# Patient Record
Sex: FEMALE | Race: WHITE | Employment: OTHER | URBAN - METROPOLITAN AREA
[De-identification: names, ages, dates, MRNs, and addresses within clinical notes are randomized per-mention and may not be internally consistent; named-entity substitution may affect disease eponyms.]

---

## 2017-02-20 ENCOUNTER — HOSPITAL ENCOUNTER (OUTPATIENT)
Dept: MAMMOGRAPHY | Age: 82
Discharge: HOME | End: 2017-02-20
Attending: INTERNAL MEDICINE
Payer: MEDICARE

## 2017-02-20 DIAGNOSIS — Z12.31 VISIT FOR SCREENING MAMMOGRAM: ICD-10-CM

## 2017-02-20 PROCEDURE — 77067 SCR MAMMO BI INCL CAD: CPT

## 2017-07-18 PROBLEM — H54.2X22: Status: ACTIVE | Noted: 2017-07-18

## 2018-01-23 PROBLEM — M85.80 OSTEOPENIA: Status: ACTIVE | Noted: 2018-01-23

## 2019-10-31 PROBLEM — H54.7 VISUAL IMPAIRMENT: Status: ACTIVE | Noted: 2019-10-31

## 2020-07-10 ENCOUNTER — HOSPITAL ENCOUNTER (OUTPATIENT)
Dept: MAMMOGRAPHY | Age: 85
Discharge: HOME | End: 2020-07-10
Attending: INTERNAL MEDICINE
Payer: MEDICARE

## 2020-07-10 DIAGNOSIS — Z12.31 OTHER SCREENING MAMMOGRAM: ICD-10-CM

## 2020-07-10 DIAGNOSIS — Z78.0 POSTMENOPAUSAL STATE: ICD-10-CM

## 2020-07-10 PROCEDURE — 77067 SCR MAMMO BI INCL CAD: CPT

## 2020-07-10 PROCEDURE — 77080 DXA BONE DENSITY AXIAL: CPT

## 2020-07-12 NOTE — PROGRESS NOTES
10 yr hip risk still at 5 %-osteopenia at spine and hip; increase slightly at spine and decreased slightly at hip(but was compared to 2011 scan-no changes for now

## 2021-02-09 ENCOUNTER — APPOINTMENT (OUTPATIENT)
Dept: INFUSION THERAPY | Age: 86
End: 2021-02-09

## 2021-02-23 ENCOUNTER — HOSPITAL ENCOUNTER (OUTPATIENT)
Dept: INFUSION THERAPY | Age: 86
Discharge: HOME | End: 2021-02-23
Payer: MEDICARE

## 2021-02-23 ENCOUNTER — HOSPITAL ENCOUNTER (OUTPATIENT)
Dept: LAB | Age: 86
Discharge: HOME | End: 2021-02-23

## 2021-02-23 VITALS
RESPIRATION RATE: 16 BRPM | BODY MASS INDEX: 21.9 KG/M2 | HEIGHT: 62 IN | HEART RATE: 71 BPM | TEMPERATURE: 98 F | WEIGHT: 119 LBS | DIASTOLIC BLOOD PRESSURE: 77 MMHG | SYSTOLIC BLOOD PRESSURE: 151 MMHG | OXYGEN SATURATION: 97 %

## 2021-02-23 LAB
CALCIUM SERPL-MCNC: 9.5 MG/DL (ref 8.3–10.4)
CREAT SERPL-MCNC: 0.8 MG/DL (ref 0.6–1)

## 2021-02-23 PROCEDURE — 74011250636 HC RX REV CODE- 250/636: Performed by: INTERNAL MEDICINE

## 2021-02-23 PROCEDURE — 36415 COLL VENOUS BLD VENIPUNCTURE: CPT

## 2021-02-23 PROCEDURE — 82310 ASSAY OF CALCIUM: CPT

## 2021-02-23 PROCEDURE — 96374 THER/PROPH/DIAG INJ IV PUSH: CPT

## 2021-02-23 PROCEDURE — 82565 ASSAY OF CREATININE: CPT

## 2021-02-23 RX ORDER — ZOLEDRONIC ACID 5 MG/100ML
5 INJECTION, SOLUTION INTRAVENOUS ONCE
Status: COMPLETED | OUTPATIENT
Start: 2021-02-23 | End: 2021-02-23

## 2021-02-23 RX ADMIN — ZOLEDRONIC ACID 5 MG: 0.05 INJECTION, SOLUTION INTRAVENOUS at 15:45

## 2021-02-23 NOTE — PROGRESS NOTES
Arrived to the [facility]. Labs reviewed/ RECLAST completed and tolerated well. Any issues or concerns during appointment: denies  Patient given education on infusion  Instructed patient to notify provider for any issues or worrisome symptoms. They verbalized understanding. Patient aware of next infusion appointment scheduled for : none, the infusion is yearly will need new order for next year. Discharged ambulatory.

## 2021-05-19 ENCOUNTER — HOSPITAL ENCOUNTER (OUTPATIENT)
Dept: ULTRASOUND IMAGING | Age: 86
Discharge: HOME | End: 2021-05-19
Attending: INTERNAL MEDICINE
Payer: MEDICARE

## 2021-05-19 DIAGNOSIS — I73.9 CLAUDICATION OF BOTH LOWER EXTREMITIES (HCC): ICD-10-CM

## 2021-05-19 PROCEDURE — 93925 LOWER EXTREMITY STUDY: CPT

## 2021-05-19 NOTE — PROGRESS NOTES
Vascular test does suggest disease in right leg-may not be enough for an intervention but can see how sx are until next visit in July

## 2021-11-09 ENCOUNTER — TRANSCRIBE ORDER (OUTPATIENT)
Dept: SCHEDULING | Age: 86
End: 2021-11-09

## 2021-11-09 DIAGNOSIS — Z12.31 VISIT FOR SCREENING MAMMOGRAM: Primary | ICD-10-CM

## 2021-12-09 ENCOUNTER — HOSPITAL ENCOUNTER (OUTPATIENT)
Dept: MAMMOGRAPHY | Age: 86
Discharge: HOME | End: 2021-12-09
Attending: INTERNAL MEDICINE
Payer: MEDICARE

## 2021-12-09 DIAGNOSIS — Z12.31 VISIT FOR SCREENING MAMMOGRAM: ICD-10-CM

## 2021-12-09 PROCEDURE — 77067 SCR MAMMO BI INCL CAD: CPT

## 2022-03-18 PROBLEM — M85.80 OSTEOPENIA: Status: ACTIVE | Noted: 2018-01-23

## 2022-03-18 PROBLEM — H54.2X22: Status: ACTIVE | Noted: 2017-07-18

## 2022-03-19 PROBLEM — H54.7 VISUAL IMPAIRMENT: Status: ACTIVE | Noted: 2019-10-31

## 2022-06-27 ENCOUNTER — OFFICE VISIT (OUTPATIENT)
Dept: INTERNAL MEDICINE CLINIC | Facility: CLINIC | Age: 87
End: 2022-06-27
Payer: MEDICARE

## 2022-06-27 ENCOUNTER — TELEPHONE (OUTPATIENT)
Dept: INTERNAL MEDICINE CLINIC | Facility: CLINIC | Age: 87
End: 2022-06-27

## 2022-06-27 VITALS
SYSTOLIC BLOOD PRESSURE: 136 MMHG | DIASTOLIC BLOOD PRESSURE: 76 MMHG | HEART RATE: 64 BPM | BODY MASS INDEX: 22.36 KG/M2 | WEIGHT: 121.5 LBS | HEIGHT: 62 IN

## 2022-06-27 DIAGNOSIS — H53.10 VISUAL DISTURBANCE, SUBJECTIVE: ICD-10-CM

## 2022-06-27 DIAGNOSIS — H53.10 VISUAL DISTURBANCE, SUBJECTIVE: Primary | ICD-10-CM

## 2022-06-27 LAB — ERYTHROCYTE [SEDIMENTATION RATE] IN BLOOD: <1 MM/HR (ref 0–30)

## 2022-06-27 PROCEDURE — G8420 CALC BMI NORM PARAMETERS: HCPCS | Performed by: INTERNAL MEDICINE

## 2022-06-27 PROCEDURE — 1090F PRES/ABSN URINE INCON ASSESS: CPT | Performed by: INTERNAL MEDICINE

## 2022-06-27 PROCEDURE — G8427 DOCREV CUR MEDS BY ELIG CLIN: HCPCS | Performed by: INTERNAL MEDICINE

## 2022-06-27 PROCEDURE — 99214 OFFICE O/P EST MOD 30 MIN: CPT | Performed by: INTERNAL MEDICINE

## 2022-06-27 PROCEDURE — 1036F TOBACCO NON-USER: CPT | Performed by: INTERNAL MEDICINE

## 2022-06-27 PROCEDURE — 1123F ACP DISCUSS/DSCN MKR DOCD: CPT | Performed by: INTERNAL MEDICINE

## 2022-06-27 RX ORDER — CLOPIDOGREL BISULFATE 75 MG/1
75 TABLET ORAL DAILY
Qty: 30 TABLET | Refills: 1 | Status: SHIPPED | OUTPATIENT
Start: 2022-06-27 | End: 2022-08-23 | Stop reason: SDUPTHER

## 2022-06-27 ASSESSMENT — PATIENT HEALTH QUESTIONNAIRE - PHQ9
SUM OF ALL RESPONSES TO PHQ QUESTIONS 1-9: 0
SUM OF ALL RESPONSES TO PHQ9 QUESTIONS 1 & 2: 0
2. FEELING DOWN, DEPRESSED OR HOPELESS: 0
SUM OF ALL RESPONSES TO PHQ QUESTIONS 1-9: 0
1. LITTLE INTEREST OR PLEASURE IN DOING THINGS: 0
SUM OF ALL RESPONSES TO PHQ QUESTIONS 1-9: 0
SUM OF ALL RESPONSES TO PHQ QUESTIONS 1-9: 0

## 2022-06-27 NOTE — PROGRESS NOTES
SUBJECTIVE:   Aden Cade is a 80 y.o. female seen for a visit regarding   Chief Complaint   Patient presents with    Cerebrovascular Accident     ? mini stroke over the W/E-head \"feels funny\"    Encopresis        Cerebrovascular Accident  This is a new problem. The current episode started in the past 7 days (2 days ago head felt funny; mid morning , had red spots in vision , no headache, no speech issue , numbness or weakness-red spots off and on over the day; was fine next day and today. Dots were couple minutes , resolve and return in few minutes). The problem occurs daily (saw postcard on wall off office here -twice-then disappear). Pertinent negatives include no fever, headaches, numbness or weakness. Other  This is a new problem. The current episode started in the past 7 days (went off metamucil due to stool leakage -has hx irritable bowel). Pertinent negatives include no fever, headaches, numbness or weakness. Past Medical History, Past Surgical History, Family history, Social History, and Medications were all reviewed with the patient today and updated as necessary. Current Outpatient Medications   Medication Sig Dispense Refill    clopidogrel (PLAVIX) 75 MG tablet Take 1 tablet by mouth daily 30 tablet 1    aspirin 81 MG EC tablet Take 81 mg by mouth daily      vitamin D 25 MCG (1000 UT) CAPS Take 1,000 Units by mouth daily       No current facility-administered medications for this visit.      Allergies   Allergen Reactions    Atorvastatin Other (See Comments)     Muscle aches    Ezetimibe-Simvastatin Other (See Comments)     Hair loss    Sulfa Antibiotics Rash     Patient Active Problem List   Diagnosis    Esophageal dysmotility    Osteopenia    Osteoarthritis, hand    Visual impairment severe bilaterally    Hypertension    DDD (degenerative disc disease), lumbar    Osteoporosis, post-menopausal    GERD (gastroesophageal reflux disease)    Dysphagia    Visual impairment Social History     Tobacco Use    Smoking status: Never Smoker    Smokeless tobacco: Never Used   Substance Use Topics    Alcohol use: Yes          Review of Systems   Constitutional: Negative for fever and unexpected weight change. Eyes: Positive for visual disturbance. Neurological: Negative for dizziness, speech difficulty, weakness, numbness and headaches. OBJECTIVE:  /76   Pulse 64   Ht 5' 2\" (1.575 m)   Wt 121 lb 8 oz (55.1 kg)   BMI 22.22 kg/m²      Physical Exam  Eyes:      Extraocular Movements:      Right eye: Normal extraocular motion. Neurological:      Cranial Nerves: No cranial nerve deficit. Deep Tendon Reflexes: Reflexes normal.            Medical problems and test results were reviewed with the patient today. ASSESSMENT and PLAN     1. Visual disturbance, subjective  -     clopidogrel (PLAVIX) 75 MG tablet; Take 1 tablet by mouth daily, Disp-30 tablet, R-1Normal  -     AFL - [facility]  -     Sedimentation Rate; Future       the following changes in treatment are made unclear what sx is from -atypical- is on an eye drop for dry eye that does not have preservative-check ESR for ischemic temporal arteritis (but no headache) -add plavix for short interval in case this is circulation -no statin for anti inflammatory effect since muscle issues with multiple statins-consider MRI if keeps recurring -? If this TIA type of syndrome  lab results and schedule for future lab studies reviewed with patient  reviewed medications and side effects in detail     No follow-ups on file.

## 2022-06-27 NOTE — TELEPHONE ENCOUNTER
Patient called office this morning through [facility] ([facility]) c/o ?mini stroke. She reported that her head \"felt funny\" over the weekend and asking for an appt with Dr Erika Sands. Appt given for this afternoon with Dr Erika Sands to assess.  Patient accepted appt/TD

## 2022-06-28 ENCOUNTER — TELEPHONE (OUTPATIENT)
Dept: INTERNAL MEDICINE CLINIC | Facility: CLINIC | Age: 87
End: 2022-06-28

## 2022-06-28 NOTE — TELEPHONE ENCOUNTER
Patient notified per Dr Nicholas Cantrell she is to take both for right now till he tells to stop/TD

## 2022-06-28 NOTE — TELEPHONE ENCOUNTER
----- Message from Blaine Brumfield sent at 6/28/2022 12:04 PM EDT -----  Subject: Message to Provider    QUESTIONS  Information for Provider? Patient wants to know if she can continue to   take a baby aspirin along with the Plavix that she is starting now. Please   call asap to advise.  ---------------------------------------------------------------------------  --------------  CALL BACK INFO  What is the best way for the office to contact you? OK to leave message on   voicemail  Preferred Call Back Phone Number?  [phone]  ---------------------------------------------------------------------------  --------------  SCRIPT ANSWERS  undefined

## 2022-07-20 ENCOUNTER — OFFICE VISIT (OUTPATIENT)
Dept: INTERNAL MEDICINE CLINIC | Facility: CLINIC | Age: 87
End: 2022-07-20
Payer: MEDICARE

## 2022-07-20 VITALS
WEIGHT: 122 LBS | BODY MASS INDEX: 23.03 KG/M2 | HEIGHT: 61 IN | SYSTOLIC BLOOD PRESSURE: 162 MMHG | DIASTOLIC BLOOD PRESSURE: 90 MMHG

## 2022-07-20 DIAGNOSIS — H53.10 VISUAL DISTURBANCE, SUBJECTIVE: ICD-10-CM

## 2022-07-20 DIAGNOSIS — I10 HYPERTENSION, UNSPECIFIED TYPE: ICD-10-CM

## 2022-07-20 DIAGNOSIS — R09.89 BILATERAL CAROTID BRUITS: Primary | ICD-10-CM

## 2022-07-20 PROCEDURE — 1090F PRES/ABSN URINE INCON ASSESS: CPT | Performed by: INTERNAL MEDICINE

## 2022-07-20 PROCEDURE — 99213 OFFICE O/P EST LOW 20 MIN: CPT | Performed by: INTERNAL MEDICINE

## 2022-07-20 PROCEDURE — G8420 CALC BMI NORM PARAMETERS: HCPCS | Performed by: INTERNAL MEDICINE

## 2022-07-20 PROCEDURE — 1123F ACP DISCUSS/DSCN MKR DOCD: CPT | Performed by: INTERNAL MEDICINE

## 2022-07-20 PROCEDURE — 1036F TOBACCO NON-USER: CPT | Performed by: INTERNAL MEDICINE

## 2022-07-20 PROCEDURE — G8427 DOCREV CUR MEDS BY ELIG CLIN: HCPCS | Performed by: INTERNAL MEDICINE

## 2022-07-20 ASSESSMENT — ENCOUNTER SYMPTOMS: PHOTOPHOBIA: 0

## 2022-08-23 ENCOUNTER — OFFICE VISIT (OUTPATIENT)
Dept: INTERNAL MEDICINE CLINIC | Facility: CLINIC | Age: 87
End: 2022-08-23
Payer: MEDICARE

## 2022-08-23 VITALS
OXYGEN SATURATION: 99 % | HEIGHT: 61 IN | WEIGHT: 123.5 LBS | SYSTOLIC BLOOD PRESSURE: 137 MMHG | DIASTOLIC BLOOD PRESSURE: 85 MMHG | HEART RATE: 66 BPM | BODY MASS INDEX: 23.32 KG/M2 | TEMPERATURE: 97.9 F

## 2022-08-23 DIAGNOSIS — H54.2X22 VISUAL IMPAIRMENT SEVERE BILATERALLY: ICD-10-CM

## 2022-08-23 DIAGNOSIS — R73.01 IMPAIRED FASTING GLUCOSE: ICD-10-CM

## 2022-08-23 DIAGNOSIS — I73.9 PERIPHERAL VASCULAR DISEASE (HCC): ICD-10-CM

## 2022-08-23 DIAGNOSIS — I10 HYPERTENSION, UNSPECIFIED TYPE: ICD-10-CM

## 2022-08-23 DIAGNOSIS — H53.10 VISUAL DISTURBANCE, SUBJECTIVE: ICD-10-CM

## 2022-08-23 DIAGNOSIS — Z00.00 MEDICARE ANNUAL WELLNESS VISIT, SUBSEQUENT: Primary | ICD-10-CM

## 2022-08-23 DIAGNOSIS — M85.859 OSTEOPENIA OF HIP, UNSPECIFIED LATERALITY: ICD-10-CM

## 2022-08-23 LAB
ALBUMIN SERPL-MCNC: 4 G/DL (ref 3.2–4.6)
ALBUMIN/GLOB SERPL: 1.3 {RATIO} (ref 1.2–3.5)
ALP SERPL-CCNC: 89 U/L (ref 50–136)
ALT SERPL-CCNC: 23 U/L (ref 12–65)
ANION GAP SERPL CALC-SCNC: 3 MMOL/L (ref 7–16)
AST SERPL-CCNC: 16 U/L (ref 15–37)
BASOPHILS # BLD: 0 K/UL (ref 0–0.2)
BASOPHILS NFR BLD: 1 % (ref 0–2)
BILIRUB SERPL-MCNC: 0.4 MG/DL (ref 0.2–1.1)
BUN SERPL-MCNC: 14 MG/DL (ref 8–23)
CALCIUM SERPL-MCNC: 9.4 MG/DL (ref 8.3–10.4)
CHLORIDE SERPL-SCNC: 106 MMOL/L (ref 98–107)
CO2 SERPL-SCNC: 27 MMOL/L (ref 21–32)
CREAT SERPL-MCNC: 0.6 MG/DL (ref 0.6–1)
DIFFERENTIAL METHOD BLD: NORMAL
EOSINOPHIL # BLD: 0.1 K/UL (ref 0–0.8)
EOSINOPHIL NFR BLD: 2 % (ref 0.5–7.8)
ERYTHROCYTE [DISTWIDTH] IN BLOOD BY AUTOMATED COUNT: 12.4 % (ref 11.9–14.6)
GLOBULIN SER CALC-MCNC: 3.2 G/DL (ref 2.3–3.5)
GLUCOSE SERPL-MCNC: 137 MG/DL (ref 65–100)
HCT VFR BLD AUTO: 41.3 % (ref 35.8–46.3)
HGB BLD-MCNC: 13.3 G/DL (ref 11.7–15.4)
IMM GRANULOCYTES # BLD AUTO: 0 K/UL (ref 0–0.5)
IMM GRANULOCYTES NFR BLD AUTO: 0 % (ref 0–5)
LYMPHOCYTES # BLD: 1.7 K/UL (ref 0.5–4.6)
LYMPHOCYTES NFR BLD: 31 % (ref 13–44)
MCH RBC QN AUTO: 30.4 PG (ref 26.1–32.9)
MCHC RBC AUTO-ENTMCNC: 32.2 G/DL (ref 31.4–35)
MCV RBC AUTO: 94.3 FL (ref 79.6–97.8)
MONOCYTES # BLD: 0.6 K/UL (ref 0.1–1.3)
MONOCYTES NFR BLD: 11 % (ref 4–12)
NEUTS SEG # BLD: 3 K/UL (ref 1.7–8.2)
NEUTS SEG NFR BLD: 55 % (ref 43–78)
NRBC # BLD: 0 K/UL (ref 0–0.2)
PLATELET # BLD AUTO: 318 K/UL (ref 150–450)
PMV BLD AUTO: 10.1 FL (ref 9.4–12.3)
POTASSIUM SERPL-SCNC: 5 MMOL/L (ref 3.5–5.1)
PROT SERPL-MCNC: 7.2 G/DL (ref 6.3–8.2)
RBC # BLD AUTO: 4.38 M/UL (ref 4.05–5.2)
SODIUM SERPL-SCNC: 136 MMOL/L (ref 136–145)
WBC # BLD AUTO: 5.4 K/UL (ref 4.3–11.1)

## 2022-08-23 PROCEDURE — G0439 PPPS, SUBSEQ VISIT: HCPCS | Performed by: INTERNAL MEDICINE

## 2022-08-23 PROCEDURE — 1123F ACP DISCUSS/DSCN MKR DOCD: CPT | Performed by: INTERNAL MEDICINE

## 2022-08-23 RX ORDER — CLOPIDOGREL BISULFATE 75 MG/1
75 TABLET ORAL DAILY
Qty: 90 TABLET | Refills: 3 | Status: SHIPPED | OUTPATIENT
Start: 2022-08-23

## 2022-08-23 RX ORDER — ZOLEDRONIC ACID 5 MG/100ML
5 INJECTION, SOLUTION INTRAVENOUS ONCE
Qty: 100 ML | Refills: 0 | Status: SHIPPED | OUTPATIENT
Start: 2022-08-23 | End: 2022-08-23

## 2022-08-23 ASSESSMENT — LIFESTYLE VARIABLES
HOW MANY STANDARD DRINKS CONTAINING ALCOHOL DO YOU HAVE ON A TYPICAL DAY: PATIENT DOES NOT DRINK
HOW OFTEN DO YOU HAVE A DRINK CONTAINING ALCOHOL: NEVER

## 2022-08-23 ASSESSMENT — PATIENT HEALTH QUESTIONNAIRE - PHQ9
SUM OF ALL RESPONSES TO PHQ QUESTIONS 1-9: 0
1. LITTLE INTEREST OR PLEASURE IN DOING THINGS: 0
SUM OF ALL RESPONSES TO PHQ QUESTIONS 1-9: 0

## 2022-08-23 NOTE — PROGRESS NOTES
Medicare Annual Wellness Visit    Brayden Neal is here for Medicare AWV    Assessment & Plan   Medicare annual wellness visit, subsequent  Visual impairment severe bilaterally  Impaired fasting glucose  -     Hemoglobin A1C; Future  Osteopenia of hip, unspecified laterality  -     zoledronic acid (RECLAST) 5 MG/100ML SOLN; Infuse 100 mLs intravenously once for 1 dose, Disp-100 mL, R-0Print  Hypertension, unspecified type  -     CBC with Auto Differential; Future  -     Comprehensive Metabolic Panel; Future  Visual disturbance, subjective  -     clopidogrel (PLAVIX) 75 MG tablet; Take 1 tablet by mouth daily, Disp-90 tablet, R-3Normal  Peripheral vascular disease (HCC)  -     clopidogrel (PLAVIX) 75 MG tablet; Take 1 tablet by mouth daily, Disp-90 tablet, R-3Normal    Recommendations for Preventive Services Due: see orders and patient instructions/AVS.  Recommended screening schedule for the next 5-10 years is provided to the patient in written form: see Patient Instructions/AVS.     Return for Medicare Annual Wellness Visit in 1 year. Subjective   The following acute and/or chronic problems were also addressed today:  HTN- no checked at home -has been labile in past-not on med-BP high here initially and down later--hx elevated lipid but statin intolerant  A1C 6.5 last year --fasting glucoses slightly up--PVD stable ; had mild/mod decrease ESTEBAN last year-on plavix-last reclast 2/2021 so do again   Patient's complete Health Risk Assessment and screening values have been reviewed and are found in Flowsheets. The following problems were reviewed today and where indicated follow up appointments were made and/or referrals ordered.     Positive Risk Factor Screenings with Interventions:               Hearing/Vision:  Do you or your family notice any trouble with your hearing that hasn't been managed with hearing aids?: No  Do you have difficulty driving, watching TV, or doing any of your daily activities because of your eyesight?: (!) Yes  Have you had an eye exam within the past year?: Yes  No results found. Hearing/Vision Interventions:  Legally blind  -uses bilateral hearing aids            Objective   Vitals:    08/23/22 0926 08/23/22 1012   BP: (!) 162/74 137/85   Pulse: 66    Temp: 97.9 °F (36.6 °C)    SpO2: 99%    Weight: 123 lb 8 oz (56 kg)    Height: 5' 1\" (1.549 m)       Body mass index is 23.34 kg/m². General Appearance: alert and oriented to person, place and time, well-developed and well-nourished, in no acute distress  Pulmonary/Chest: clear to auscultation bilaterally- no wheezes, rales or rhonchi, normal air movement, no respiratory distress  Cardiovascular: normal rate, normal S1 and S2, no gallops, intact distal pulses, and no carotid bruits       Allergies   Allergen Reactions    Atorvastatin Other (See Comments)     Muscle aches    Ezetimibe-Simvastatin Other (See Comments)     Hair loss    Sulfa Antibiotics Rash     Prior to Visit Medications    Medication Sig Taking?  Authorizing Provider   zoledronic acid (RECLAST) 5 MG/100ML SOLN Infuse 100 mLs intravenously once for 1 dose Yes Jose Wang MD   clopidogrel (PLAVIX) 75 MG tablet Take 1 tablet by mouth daily Yes Jose Wang MD   aspirin 81 MG EC tablet Take 81 mg by mouth daily Yes Ar Automatic Reconciliation   vitamin D 25 MCG (1000 UT) CAPS Take 1,000 Units by mouth daily Yes Ar Automatic Reconciliation       CareTeam (Including outside providers/suppliers regularly involved in providing care):   Patient Care Team:  Jose Wang MD as PCP - General  Jose Wang MD as PCP - [facility] Empaneled Provider     Reviewed and updated this visit:  Tobacco  Allergies  Meds  Problems  Med Hx  Surg Hx  Soc Hx  Fam Hx

## 2022-08-24 LAB
EST. AVERAGE GLUCOSE BLD GHB EST-MCNC: 143 MG/DL
HBA1C MFR BLD: 6.6 % (ref 4.8–5.6)

## 2022-08-26 ENCOUNTER — TELEPHONE (OUTPATIENT)
Dept: INTERNAL MEDICINE CLINIC | Facility: CLINIC | Age: 87
End: 2022-08-26

## 2022-09-01 ENCOUNTER — HOSPITAL ENCOUNTER (OUTPATIENT)
Dept: LAB | Age: 87
Discharge: HOME | End: 2022-09-04

## 2022-09-01 ENCOUNTER — HOSPITAL ENCOUNTER (OUTPATIENT)
Dept: INFUSION THERAPY | Age: 87
Discharge: HOME | End: 2022-09-01
Payer: MEDICARE

## 2022-09-01 VITALS
WEIGHT: 122.2 LBS | DIASTOLIC BLOOD PRESSURE: 77 MMHG | SYSTOLIC BLOOD PRESSURE: 174 MMHG | RESPIRATION RATE: 16 BRPM | TEMPERATURE: 98.3 F | OXYGEN SATURATION: 99 % | HEART RATE: 73 BPM | BODY MASS INDEX: 23.09 KG/M2

## 2022-09-01 LAB
CALCIUM SERPL-MCNC: 9.1 MG/DL (ref 8.3–10.4)
CREAT SERPL-MCNC: 0.7 MG/DL (ref 0.6–1)

## 2022-09-01 PROCEDURE — 82565 ASSAY OF CREATININE: CPT

## 2022-09-01 PROCEDURE — 36415 COLL VENOUS BLD VENIPUNCTURE: CPT

## 2022-09-01 PROCEDURE — 96365 THER/PROPH/DIAG IV INF INIT: CPT

## 2022-09-01 PROCEDURE — 6360000002 HC RX W HCPCS: Performed by: INTERNAL MEDICINE

## 2022-09-01 PROCEDURE — 82310 ASSAY OF CALCIUM: CPT

## 2022-09-01 PROCEDURE — 6370000000 HC RX 637 (ALT 250 FOR IP): Performed by: INTERNAL MEDICINE

## 2022-09-01 RX ORDER — ACETAMINOPHEN 325 MG/1
650 TABLET ORAL ONCE
Status: COMPLETED | OUTPATIENT
Start: 2022-09-01 | End: 2022-09-01

## 2022-09-01 RX ORDER — ZOLEDRONIC ACID 5 MG/100ML
5 INJECTION, SOLUTION INTRAVENOUS ONCE
Status: COMPLETED | OUTPATIENT
Start: 2022-09-01 | End: 2022-09-01

## 2022-09-01 RX ADMIN — ZOLEDRONIC ACID 5 MG: 0.05 INJECTION, SOLUTION INTRAVENOUS at 14:12

## 2022-09-01 RX ADMIN — ACETAMINOPHEN 650 MG: 325 TABLET ORAL at 14:09

## 2022-09-01 NOTE — PROGRESS NOTES
Patient arrived ambulatory to infusion. Reclast infusion completed. Patient tolerated well. Discharged ambulatory.

## 2022-11-08 ENCOUNTER — OFFICE VISIT (OUTPATIENT)
Dept: INTERNAL MEDICINE CLINIC | Facility: CLINIC | Age: 87
End: 2022-11-08
Payer: MEDICARE

## 2022-11-08 VITALS
DIASTOLIC BLOOD PRESSURE: 70 MMHG | BODY MASS INDEX: 23.6 KG/M2 | HEIGHT: 61 IN | WEIGHT: 125 LBS | SYSTOLIC BLOOD PRESSURE: 156 MMHG

## 2022-11-08 DIAGNOSIS — R60.0 EDEMA OF BOTH FEET: ICD-10-CM

## 2022-11-08 DIAGNOSIS — I10 HYPERTENSION, UNSPECIFIED TYPE: Primary | ICD-10-CM

## 2022-11-08 PROCEDURE — G8420 CALC BMI NORM PARAMETERS: HCPCS | Performed by: INTERNAL MEDICINE

## 2022-11-08 PROCEDURE — 1090F PRES/ABSN URINE INCON ASSESS: CPT | Performed by: INTERNAL MEDICINE

## 2022-11-08 PROCEDURE — 99213 OFFICE O/P EST LOW 20 MIN: CPT | Performed by: INTERNAL MEDICINE

## 2022-11-08 PROCEDURE — 1036F TOBACCO NON-USER: CPT | Performed by: INTERNAL MEDICINE

## 2022-11-08 PROCEDURE — G8427 DOCREV CUR MEDS BY ELIG CLIN: HCPCS | Performed by: INTERNAL MEDICINE

## 2022-11-08 PROCEDURE — 1123F ACP DISCUSS/DSCN MKR DOCD: CPT | Performed by: INTERNAL MEDICINE

## 2022-11-08 PROCEDURE — G8484 FLU IMMUNIZE NO ADMIN: HCPCS | Performed by: INTERNAL MEDICINE

## 2022-11-08 RX ORDER — FUROSEMIDE 20 MG/1
TABLET ORAL
Qty: 15 TABLET | Refills: 1 | Status: SHIPPED | OUTPATIENT
Start: 2022-11-08

## 2022-11-08 ASSESSMENT — ENCOUNTER SYMPTOMS
COUGH: 0
SHORTNESS OF BREATH: 0

## 2022-11-08 NOTE — PROGRESS NOTES
SUBJECTIVE:   Conchita Valladares is a 80 y.o. female seen for a visit regarding   Chief Complaint   Patient presents with    Leg Swelling     Pt c/o bilateral swelling to lower legs,more to her left leg (Pt had boil peanuts Sat)         Swelling  This is a new problem. The current episode started in the past 7 days. The problem occurs constantly (swelling in legs Sun --had boiled peanuts on Sat(in salt water)). Pertinent negatives include no coughing. Past Medical History, Past Surgical History, Family history, Social History, and Medications were all reviewed with the patient today and updated as necessary. Current Outpatient Medications   Medication Sig Dispense Refill    furosemide (LASIX) 20 MG tablet 1 qod for fluid 15 tablet 1    zoledronic acid (RECLAST) 5 MG/100ML SOLN Infuse 100 mLs intravenously once for 1 dose 100 mL 0    clopidogrel (PLAVIX) 75 MG tablet Take 1 tablet by mouth daily 90 tablet 3    aspirin 81 MG EC tablet Take 81 mg by mouth daily      vitamin D 25 MCG (1000 UT) CAPS Take 1,000 Units by mouth daily       No current facility-administered medications for this visit. Allergies   Allergen Reactions    Atorvastatin Other (See Comments)     Muscle aches    Ezetimibe-Simvastatin Other (See Comments)     Hair loss    Sulfa Antibiotics Rash     Patient Active Problem List   Diagnosis    Esophageal dysmotility    Osteopenia    Osteoarthritis, hand    Visual impairment severe bilaterally    Hypertension    DDD (degenerative disc disease), lumbar    GERD (gastroesophageal reflux disease)    Dysphagia    Visual impairment    Impaired fasting glucose     Social History     Tobacco Use    Smoking status: Never    Smokeless tobacco: Never   Substance Use Topics    Alcohol use: Yes          Review of Systems   Respiratory:  Negative for cough and shortness of breath. Cardiovascular:  Positive for leg swelling.        OBJECTIVE:  BP (!) 160/62   Ht 5' 1\" (1.549 m)   Wt 125 lb (56.7 kg) BMI 23.62 kg/m²      Physical Exam  Constitutional:       General: She is not in acute distress. Appearance: Normal appearance. Cardiovascular:      Rate and Rhythm: Normal rate and regular rhythm. Heart sounds: No murmur heard. Pulmonary:      Effort: Pulmonary effort is normal.      Breath sounds: No wheezing or rales. Musculoskeletal:      Right lower leg: Edema present. Left lower leg: Edema present. Comments: 1-2 plus to upper tibial bilateral          Medical problems and test results were reviewed with the patient today. ASSESSMENT and PLAN     1. Hypertension, unspecified type  2. Edema of both feet  -     furosemide (LASIX) 20 MG tablet; 1 qod for fluid, Disp-15 tablet, R-1Normal   Likely extra sodium from the nuts (and had pickles)-no CHF sx -use lasix qod few days should help this then stop it and help the BP also  reviewed medications and side effects in detail     No follow-ups on file.

## 2023-01-26 ENCOUNTER — APPOINTMENT (OUTPATIENT)
Dept: GENERAL RADIOLOGY | Age: 88
DRG: 378 | End: 2023-01-26
Payer: MEDICARE

## 2023-01-26 ENCOUNTER — HOSPITAL ENCOUNTER (INPATIENT)
Age: 88
LOS: 4 days | Discharge: HOME | DRG: 378 | End: 2023-02-01
Attending: EMERGENCY MEDICINE | Admitting: HOSPITALIST
Payer: MEDICARE

## 2023-01-26 ENCOUNTER — APPOINTMENT (OUTPATIENT)
Dept: CT IMAGING | Age: 88
DRG: 378 | End: 2023-01-26
Payer: MEDICARE

## 2023-01-26 DIAGNOSIS — H53.9 TRANSIENT VISION DISTURBANCE: ICD-10-CM

## 2023-01-26 DIAGNOSIS — Z09 HOSPITAL DISCHARGE FOLLOW-UP: ICD-10-CM

## 2023-01-26 DIAGNOSIS — K92.2 GASTROINTESTINAL HEMORRHAGE, UNSPECIFIED GASTROINTESTINAL HEMORRHAGE TYPE: Primary | ICD-10-CM

## 2023-01-26 DIAGNOSIS — D64.9 ANEMIA, UNSPECIFIED TYPE: ICD-10-CM

## 2023-01-26 PROBLEM — H35.30 MACULAR DEGENERATION: Status: ACTIVE | Noted: 2023-01-26

## 2023-01-26 PROBLEM — H54.8 LEGALLY BLIND: Status: ACTIVE | Noted: 2023-01-26

## 2023-01-26 PROBLEM — H54.7 VISUAL IMPAIRMENT: Status: ACTIVE | Noted: 2019-10-31

## 2023-01-26 PROBLEM — H40.9 GLAUCOMA: Status: ACTIVE | Noted: 2023-01-26

## 2023-01-26 LAB
ALBUMIN SERPL-MCNC: 3.4 G/DL (ref 3.2–4.6)
ALBUMIN/GLOB SERPL: 1.2 (ref 0.4–1.6)
ALP SERPL-CCNC: 60 U/L (ref 50–130)
ALT SERPL-CCNC: 18 U/L (ref 12–65)
ANION GAP SERPL CALC-SCNC: 3 MMOL/L (ref 2–11)
APPEARANCE UR: CLEAR
AST SERPL-CCNC: 13 U/L (ref 15–37)
BASOPHILS # BLD: 0 K/UL (ref 0–0.2)
BASOPHILS NFR BLD: 0 % (ref 0–2)
BILIRUB SERPL-MCNC: 0.3 MG/DL (ref 0.2–1.1)
BILIRUB UR QL: NEGATIVE
BUN SERPL-MCNC: 24 MG/DL (ref 8–23)
CALCIUM SERPL-MCNC: 8.8 MG/DL (ref 8.3–10.4)
CHLORIDE SERPL-SCNC: 110 MMOL/L (ref 101–110)
CO2 SERPL-SCNC: 26 MMOL/L (ref 21–32)
COLOR UR: NORMAL
CREAT SERPL-MCNC: 0.71 MG/DL (ref 0.6–1)
DIFFERENTIAL METHOD BLD: ABNORMAL
EOSINOPHIL # BLD: 0 K/UL (ref 0–0.8)
EOSINOPHIL NFR BLD: 0 % (ref 0.5–7.8)
ERYTHROCYTE [DISTWIDTH] IN BLOOD BY AUTOMATED COUNT: 15.9 % (ref 11.9–14.6)
FERRITIN SERPL-MCNC: 13 NG/ML (ref 8–388)
FOLATE SERPL-MCNC: 44.7 NG/ML (ref 3.1–17.5)
GLOBULIN SER CALC-MCNC: 2.8 G/DL (ref 2.8–4.5)
GLUCOSE SERPL-MCNC: 163 MG/DL (ref 65–100)
GLUCOSE UR STRIP.AUTO-MCNC: 100 MG/DL
HCT VFR BLD AUTO: 18.1 % (ref 35.8–46.3)
HGB BLD-MCNC: 5.5 G/DL (ref 11.7–15.4)
HGB UR QL STRIP: NEGATIVE
HISTORY CHECK: NORMAL
IMM GRANULOCYTES # BLD AUTO: 0 K/UL (ref 0–0.5)
IMM GRANULOCYTES NFR BLD AUTO: 1 % (ref 0–5)
IRON SATN MFR SERPL: 2 %
IRON SERPL-MCNC: 7 UG/DL (ref 35–150)
KETONES UR QL STRIP.AUTO: 15 MG/DL
LEUKOCYTE ESTERASE UR QL STRIP.AUTO: NEGATIVE
LYMPHOCYTES # BLD: 1.2 K/UL (ref 0.5–4.6)
LYMPHOCYTES NFR BLD: 18 % (ref 13–44)
MCH RBC QN AUTO: 23.9 PG (ref 26.1–32.9)
MCHC RBC AUTO-ENTMCNC: 30.4 G/DL (ref 31.4–35)
MCV RBC AUTO: 78.7 FL (ref 82–102)
MONOCYTES # BLD: 0.6 K/UL (ref 0.1–1.3)
MONOCYTES NFR BLD: 9 % (ref 4–12)
NEUTS SEG # BLD: 4.6 K/UL (ref 1.7–8.2)
NEUTS SEG NFR BLD: 72 % (ref 43–78)
NITRITE UR QL STRIP.AUTO: NEGATIVE
NRBC # BLD: 0 K/UL (ref 0–0.2)
PH UR STRIP: 7.5
PLATELET # BLD AUTO: 269 K/UL (ref 150–450)
PMV BLD AUTO: 9.8 FL (ref 9.4–12.3)
POTASSIUM SERPL-SCNC: 3.8 MMOL/L (ref 3.5–5.1)
PROT SERPL-MCNC: 6.2 G/DL (ref 6.3–8.2)
PROT UR STRIP-MCNC: NEGATIVE MG/DL
RBC # BLD AUTO: 2.3 M/UL (ref 4.05–5.2)
SODIUM SERPL-SCNC: 139 MMOL/L (ref 133–143)
SP GR UR REFRACTOMETRY: 1.01
TIBC SERPL-MCNC: 380 UG/DL (ref 250–450)
TRANSFERRIN SERPL-MCNC: 289 MG/DL (ref 202–364)
UROBILINOGEN UR QL STRIP.AUTO: 0.2 EU/DL
VIT B12 SERPL-MCNC: 287 PG/ML (ref 193–986)
WBC # BLD AUTO: 6.4 K/UL (ref 4.3–11.1)

## 2023-01-26 PROCEDURE — 6360000002 HC RX W HCPCS: Performed by: NURSE PRACTITIONER

## 2023-01-26 PROCEDURE — G0378 HOSPITAL OBSERVATION PER HR: HCPCS

## 2023-01-26 PROCEDURE — 85025 COMPLETE CBC W/AUTO DIFF WBC: CPT

## 2023-01-26 PROCEDURE — 36430 TRANSFUSION BLD/BLD COMPNT: CPT

## 2023-01-26 PROCEDURE — 96375 TX/PRO/DX INJ NEW DRUG ADDON: CPT

## 2023-01-26 PROCEDURE — 82728 ASSAY OF FERRITIN: CPT

## 2023-01-26 PROCEDURE — 82607 VITAMIN B-12: CPT

## 2023-01-26 PROCEDURE — 2580000003 HC RX 258: Performed by: NURSE PRACTITIONER

## 2023-01-26 PROCEDURE — P9016 RBC LEUKOCYTES REDUCED: HCPCS

## 2023-01-26 PROCEDURE — 99285 EMERGENCY DEPT VISIT HI MDM: CPT | Performed by: EMERGENCY MEDICINE

## 2023-01-26 PROCEDURE — 86902 BLOOD TYPE ANTIGEN DONOR EA: CPT

## 2023-01-26 PROCEDURE — C9113 INJ PANTOPRAZOLE SODIUM, VIA: HCPCS | Performed by: NURSE PRACTITIONER

## 2023-01-26 PROCEDURE — 71046 X-RAY EXAM CHEST 2 VIEWS: CPT

## 2023-01-26 PROCEDURE — 81003 URINALYSIS AUTO W/O SCOPE: CPT

## 2023-01-26 PROCEDURE — 82746 ASSAY OF FOLIC ACID SERUM: CPT

## 2023-01-26 PROCEDURE — 86920 COMPATIBILITY TEST SPIN: CPT

## 2023-01-26 PROCEDURE — 83540 ASSAY OF IRON: CPT

## 2023-01-26 PROCEDURE — A4216 STERILE WATER/SALINE, 10 ML: HCPCS | Performed by: NURSE PRACTITIONER

## 2023-01-26 PROCEDURE — 86921 COMPATIBILITY TEST INCUBATE: CPT

## 2023-01-26 PROCEDURE — 30233N1 TRANSFUSION OF NONAUTOLOGOUS RED BLOOD CELLS INTO PERIPHERAL VEIN, PERCUTANEOUS APPROACH: ICD-10-PCS | Performed by: HOSPITALIST

## 2023-01-26 PROCEDURE — 70450 CT HEAD/BRAIN W/O DYE: CPT

## 2023-01-26 PROCEDURE — 86870 RBC ANTIBODY IDENTIFICATION: CPT

## 2023-01-26 PROCEDURE — 80053 COMPREHEN METABOLIC PANEL: CPT

## 2023-01-26 PROCEDURE — 86922 COMPATIBILITY TEST ANTIGLOB: CPT

## 2023-01-26 PROCEDURE — 86901 BLOOD TYPING SEROLOGIC RH(D): CPT

## 2023-01-26 PROCEDURE — 86905 BLOOD TYPING RBC ANTIGENS: CPT

## 2023-01-26 RX ORDER — SODIUM CHLORIDE 9 MG/ML
INJECTION, SOLUTION INTRAVENOUS PRN
Status: DISCONTINUED | OUTPATIENT
Start: 2023-01-26 | End: 2023-02-01 | Stop reason: HOSPADM

## 2023-01-26 RX ORDER — 0.9 % SODIUM CHLORIDE 0.9 %
100 INTRAVENOUS SOLUTION INTRAVENOUS
Status: DISCONTINUED | OUTPATIENT
Start: 2023-01-26 | End: 2023-02-01 | Stop reason: HOSPADM

## 2023-01-26 RX ORDER — SODIUM CHLORIDE 0.9 % (FLUSH) 0.9 %
5-40 SYRINGE (ML) INJECTION PRN
Status: DISCONTINUED | OUTPATIENT
Start: 2023-01-26 | End: 2023-02-01 | Stop reason: HOSPADM

## 2023-01-26 RX ORDER — SODIUM CHLORIDE 0.9 % (FLUSH) 0.9 %
10 SYRINGE (ML) INJECTION
Status: DISCONTINUED | OUTPATIENT
Start: 2023-01-26 | End: 2023-02-01 | Stop reason: HOSPADM

## 2023-01-26 RX ORDER — ONDANSETRON 4 MG/1
4 TABLET, ORALLY DISINTEGRATING ORAL EVERY 8 HOURS PRN
Status: DISCONTINUED | OUTPATIENT
Start: 2023-01-26 | End: 2023-02-01 | Stop reason: HOSPADM

## 2023-01-26 RX ORDER — ONDANSETRON 2 MG/ML
4 INJECTION INTRAMUSCULAR; INTRAVENOUS EVERY 6 HOURS PRN
Status: DISCONTINUED | OUTPATIENT
Start: 2023-01-26 | End: 2023-02-01 | Stop reason: HOSPADM

## 2023-01-26 RX ORDER — SODIUM CHLORIDE 0.9 % (FLUSH) 0.9 %
5-40 SYRINGE (ML) INJECTION EVERY 12 HOURS SCHEDULED
Status: DISCONTINUED | OUTPATIENT
Start: 2023-01-26 | End: 2023-02-01 | Stop reason: HOSPADM

## 2023-01-26 RX ORDER — ACETAMINOPHEN 650 MG/1
650 SUPPOSITORY RECTAL EVERY 6 HOURS PRN
Status: DISCONTINUED | OUTPATIENT
Start: 2023-01-26 | End: 2023-02-01 | Stop reason: HOSPADM

## 2023-01-26 RX ORDER — ACETAMINOPHEN 325 MG/1
650 TABLET ORAL EVERY 6 HOURS PRN
Status: DISCONTINUED | OUTPATIENT
Start: 2023-01-26 | End: 2023-02-01 | Stop reason: HOSPADM

## 2023-01-26 RX ORDER — SODIUM CHLORIDE 9 MG/ML
INJECTION, SOLUTION INTRAVENOUS PRN
Status: DISCONTINUED | OUTPATIENT
Start: 2023-01-26 | End: 2023-01-28

## 2023-01-26 RX ADMIN — SODIUM CHLORIDE, PRESERVATIVE FREE 10 ML: 5 INJECTION INTRAVENOUS at 20:20

## 2023-01-26 RX ADMIN — SODIUM CHLORIDE, PRESERVATIVE FREE 40 MG: 5 INJECTION INTRAVENOUS at 15:10

## 2023-01-26 ASSESSMENT — PAIN SCALES - GENERAL: PAINLEVEL_OUTOF10: 0

## 2023-01-26 ASSESSMENT — LIFESTYLE VARIABLES
HOW OFTEN DO YOU HAVE A DRINK CONTAINING ALCOHOL: NEVER
HOW MANY STANDARD DRINKS CONTAINING ALCOHOL DO YOU HAVE ON A TYPICAL DAY: PATIENT DOES NOT DRINK

## 2023-01-26 ASSESSMENT — ENCOUNTER SYMPTOMS: SHORTNESS OF BREATH: 1

## 2023-01-26 NOTE — ED PROVIDER NOTES
Emergency Department Provider Note                   PCP:                Nallely Tavarez MD               Age: 80 y.o. Sex: female       ICD-10-CM    1. Gastrointestinal hemorrhage, unspecified gastrointestinal hemorrhage type  K92.2       2. Anemia, unspecified type  D64.9       3. Transient vision disturbance  H53.9           DISPOSITION Admitted 01/26/2023 12:35:55 PM       Medical Decision Making  TIA, CVA, ischemic stroke, Bell's palsy, intracranial hemorrhage,  subdural hematoma, subarachnoid hemorrhage,    tension headache, migraine headache, brain tumor, cluster headache, sinusitis    electrolyte abnormality, renal failure, malignant hypertension, UTI    Glaucoma, iritis, optic neuritis, macular degeneration      Amount and/or Complexity of Data Reviewed  Labs: ordered. Decision-making details documented in ED Course. Radiology: ordered and independent interpretation performed. Decision-making details documented in ED Course. ECG/medicine tests: ordered and independent interpretation performed. Decision-making details documented in ED Course. Risk  Prescription drug management. Decision regarding hospitalization. Complexity of Problem: 2 or more stable chronic illnesses. (4)  Complexity of Problem:1 acute or chronic illness that poses a threat to life or bodily function. (5)  The patients assessment required an independent historian: I spoke with a family member. I have conducted an independent ordering and review of Labs. I have conducted an independent ordering and review of EKG. I have conducted an independent ordering and review of X-rays. I have conducted an independent ordering and review of CT Scan. I have reviewed records from an external source: ED records from outside this hospital.  I have reviewed records from an external source: provider visit notes from PCP.   I have reviewed records from an external source: provider visit notes from outside specialist.  I have reviewed records from an external source: previous old EKG's reviewed. I have reviewed records from an external source: previous lab results from outside ED. Considerations: Shared decision making was utilized in the care of this patient. I discussed disposition with another provider. Patient was admitted I have communication with admitting physician. ED Course as of 01/30/23 1504   Thu Jan 26, 2023   1138 CT HEAD WO CONTRAST  IMPRESSION:     1. No acute intracranial abnormality. 2. Chronic small vessel ischemic changes. 3. Chronic maxillary sinus disease. [JY]   4116 I talked to the patient and her son about the findings on the blood work here in the emergency department. Rectal exam was performed with female nursing staff present and this is noted to be fecal occult positive. Stool is formed. The patient and I discussed the need for a blood transfusion and admission to the hospital for further work-up and she is agreeable with this plan. Robin Silver   5306 Dr. Ricky Mandujano the hospitalist was consulted and will come and see the patient. [KH]      ED Course User Index  [KH] Brynn Davey, DO        Orders Placed This Encounter   Procedures    Critical Care    CT HEAD WO CONTRAST    XR CHEST (2 VW)    CBC with Auto Differential    Comprehensive Metabolic Panel    Urinalysis w rflx microscopic    Transferrin Saturation    Vitamin B12    Ferritin    Folate    Transferrin    Hemoglobin and Hematocrit    Protime-INR    APTT    Basic Metabolic Panel w/ Reflex to MG    Hemoglobin and Hematocrit    Basic Metabolic Panel    Hemoglobin    TSH with Reflex    ADULT DIET;  Regular; Low Fat/Low Chol/High Fiber/JUAN; No red dye    Vital signs per unit routine    Nursing to document all stools for color and consistency    Place intermittent pneumatic compression device    Telemetry monitoring - 24 hour duration    Up with assistance    Verify informed consent    Verify informed consent    Consult to Gastroenterology    OT eval and treat    PT evaluation and treat    PLEASE READ & DOCUMENT PPD TEST IN 24 HRS    PLEASE READ & DOCUMENT PPD TEST IN 48 HRS    PLEASE READ & DOCUMENT PPD TEST IN 72 HRS    PREPARE RBC (CROSSMATCH), 1 Units    Type and Screen    Place in Observation Service    ADMIT TO INPATIENT     COLONOSCOPY        Medications   sodium chloride flush 0.9 % injection 5-40 mL (10 mLs IntraVENous Given 1/30/23 0829)   sodium chloride flush 0.9 % injection 5-40 mL (10 mLs IntraVENous Given 1/29/23 0023)   0.9 % sodium chloride infusion (has no administration in time range)   ondansetron (ZOFRAN-ODT) disintegrating tablet 4 mg (has no administration in time range)     Or   ondansetron (ZOFRAN) injection 4 mg (has no administration in time range)   acetaminophen (TYLENOL) tablet 650 mg (has no administration in time range)     Or   acetaminophen (TYLENOL) suppository 650 mg (has no administration in time range)   0.9 % sodium chloride bolus (has no administration in time range)   iopamidol (ISOVUE-370) 76 % injection 50 mL (has no administration in time range)   sodium chloride flush 0.9 % injection 10 mL (has no administration in time range)   amLODIPine (NORVASC) tablet 5 mg (0 mg Oral Held 1/30/23 4040)   cloNIDine (CATAPRES) tablet 0.2 mg (has no administration in time range)   cyanocobalamin injection 1,000 mcg (1,000 mcg IntraMUSCular Given 1/30/23 0828)   pantoprazole (PROTONIX) tablet 40 mg (has no administration in time range)   bisacodyl (DULCOLAX) EC tablet 20 mg (20 mg Oral Given 1/29/23 1632)   iron dextran complex (INFED) 25 mg in sodium chloride 0.9 % 50 mL (test dose) IVPB (0 mg IntraVENous Stopped 1/28/23 1310)   iron dextran complex (INFED) 975 mg in sodium chloride 0.9 % 500 mL IVPB (0 mg IntraVENous Stopped 1/28/23 1824)   cyanocobalamin injection 1,000 mcg (1,000 mcg IntraMUSCular Given 1/28/23 2259)   polyethylene glycol (GLYCOLAX) powder 238 g (238 g Oral Given 1/29/23 1732)       Current Discharge Medication Joaquin Hernandez is a 80 y.o. female who presents to the Emergency Department with chief complaint of    Chief Complaint   Patient presents with    Dizziness      A 79-year-old female presenting to the emergency department today complaining of decreased vision from baseline impairment. Patient says she got up at 7:00 and could not see anything at all. Patient states normally she can only see shadows secondary to advanced macular degeneration and glaucoma. The patient said that the loss of vision lasted for about 20 minutes and for about 10 minutes during that episode she felt sweaty. The patient said she was grasping around looking for the walls and felt off balance because she could not see. The patient denied any dizziness. She denied headache. The patient said that symptoms resolved. She called her son and by 1 when he got there she looked pale but says she was feeling better and now she is back to baseline. The patient denied any chest pain abdominal pain nausea or vomiting. She says that she has been feeling a little short of breath the last 2 days. Review of Systems   Eyes:  Positive for visual disturbance. Respiratory:  Positive for shortness of breath. All other systems reviewed and are negative.     Past Medical History:   Diagnosis Date    Arthritis     Chest pain 9/11/2014    Dysphagia 9/11/2014    Edema 9/11/2014    Esophageal dysmotility 9/12/2014    Hypercholesterolemia     Hypertension 9/11/2014    Other ill-defined conditions(799.89)     high cholesterol    Shingles         Past Surgical History:   Procedure Laterality Date    APPENDECTOMY      incidental    COLONOSCOPY  10/03    HYSTERECTOMY (CERVIX STATUS UNKNOWN)          Family History   Problem Relation Age of Onset    Breast Cancer Mother 36        Social History     Socioeconomic History    Marital status:    Tobacco Use    Smoking status: Never    Smokeless tobacco: Never   Vaping Use    Vaping Use: Never used   Substance and Sexual Activity    Alcohol use: Yes    Drug use: No    Sexual activity: Not Currently     Social Determinants of Health     Physical Activity: Sufficiently Active    Days of Exercise per Week: 7 days    Minutes of Exercise per Session: 30 min        Allergies: Atorvastatin, Ezetimibe-simvastatin, and Sulfa antibiotics    Current Discharge Medication List        CONTINUE these medications which have NOT CHANGED    Details   furosemide (LASIX) 20 MG tablet 1 qod for fluid  Qty: 15 tablet, Refills: 1    Associated Diagnoses: Edema of both feet      zoledronic acid (RECLAST) 5 MG/100ML SOLN Infuse 100 mLs intravenously once for 1 dose  Qty: 100 mL, Refills: 0    Associated Diagnoses: Osteopenia of hip, unspecified laterality      clopidogrel (PLAVIX) 75 MG tablet Take 1 tablet by mouth daily  Qty: 90 tablet, Refills: 3    Associated Diagnoses: Visual disturbance, subjective; Peripheral vascular disease (HCC)      aspirin 81 MG EC tablet Take 81 mg by mouth daily      vitamin D 25 MCG (1000 UT) CAPS Take 1,000 Units by mouth daily              Vitals signs and nursing note reviewed. Patient Vitals for the past 4 hrs:   Temp Pulse Resp BP SpO2   01/30/23 1336 -- 64 14 (!) 114/53 98 %   01/30/23 1331 -- 63 14 (!) 124/58 99 %   01/30/23 1325 -- 68 14 (!) 105/52 98 %   01/30/23 1321 -- 81 14 (!) 90/49 97 %   01/30/23 1315 -- 73 14 (!) 92/48 96 %   01/30/23 1314 -- 73 14 (!) 84/44 96 %   01/30/23 1312 -- 75 -- -- --   01/30/23 1311 -- 77 14 (!) 82/47 100 %   01/30/23 1113 97.7 °F (36.5 °C) 76 18 (!) 148/84 98 %          Physical Exam     GENERAL:The patient has Body mass index is 22.86 kg/m². Well-hydrated. VITAL SIGNS: Heart rate, blood pressure, respiratory rate reviewed as recorded in  nurse's notes  EYES: Pupils reactive. Extraocular motion intact. No conjunctival redness or drainage. NOSE: No nasal drainage or epistaxis. MOUTH/THROAT: Pharynx clear; airway patent.   NECK: Supple, no meningeal signs. Trachea midline. No masses or thyromegaly. LUNGS: Breath sounds clear and equal bilaterally no accessory muscle use. CHEST: No deformity  CARDIOVASCULAR: Regular rate and rhythm  ABDOMEN: Soft without tenderness. No palpable masses or organomegaly. No  peritoneal signs. No rigidity. EXTREMITIES: No clubbing or cyanosis. No joint swelling. Normal muscle tone. No  restricted range of motion appreciated. NEUROLOGIC: Sensation is grossly intact. Cranial nerve exam reveals face is  symmetrical, tongue is midline speech is clear. Negative pronator drift in the arms and legs.  strength 5-5 equal bilaterally and patient is able to do finger-to-nose without difficulty. SKIN: No rash or petechiae. Good skin turgor palpated. PSYCHIATRIC: Alert and oriented. Appropriate behavior and judgment. Critical Care  Performed by: Lizbeth Gutierres DO  Authorized by: Lizbeth Gutierres DO     Comments:      Critical care time: 79 minutes of critical care time was performed in the emergency department. This was separate from any other procedures listed during the patients emergency department course. The failure to initiate these interventions on an urgent basis would likely have resulted in sudden, clinically significant or life-threatening deterioration in the patients condition. Results for orders placed or performed during the hospital encounter of 01/26/23   CT HEAD WO CONTRAST    Narrative    EXAMINATION: CT HEAD WO CONTRAST 1/26/2023 10:37 AM    ACCESSION NUMBER: [id number]    COMPARISON: CT head 9/17/2015, 7/26/2008. INDICATION: vision changes    TECHNIQUE: Multiple-row detector helical CT examination of the head without  intravenous contrast.     Radiation dose reduction techniques were used for this study. Our CT scanners  use one or all of the following: Automated exposure control, adjustment of the  mA and/or kV according to patient size, iterative reconstruction.     FINDINGS:   There is no midline shift or mass lesion. There is no evidence of intracranial  hemorrhage or acute infarct. Periventricular white matter hypodensities, which  are nonspecific but, associated with chronic small vessel ischemic changes. Prominence of the ventricles and CSF containing spaces, compatible with mild  cerebral volume loss. Postsurgical changes of the bilateral globes. The orbits are unremarkable. No  fractures are evident. Complete opacification of the right maxillary sinus with thickening of the  maxillary sinus wall. Left maxillary sinus mucosal thickening. Remaining  paranasal sinuses are pneumatized and free of fluid. Impression    1. No acute intracranial abnormality. 2. Chronic small vessel ischemic changes. 3. Chronic maxillary sinus disease. XR CHEST (2 VW)    Narrative    EXAMINATION: XR CHEST (2 VW) 1/26/2023 10:20 AM    ACCESSION NUMBER: [id number]    COMPARISON: Chest radiograph 1/19/2010. INDICATION: Shortness of breath. TECHNIQUE: PA and lateral views of the chest were obtained. FINDINGS:   No focal consolidation. No pulmonary edema. No pneumothorax or sizable pleural effusion. Stable cardiomediastinal silhouette. Aortic arch atherosclerotic calcifications. Impression    No acute airspace disease.      CBC with Auto Differential   Result Value Ref Range    WBC 6.4 4.3 - 11.1 K/uL    RBC 2.30 (L) 4.05 - 5.2 M/uL    Hemoglobin 5.5 (LL) 11.7 - 15.4 g/dL    Hematocrit 18.1 (L) 35.8 - 46.3 %    MCV 78.7 (L) 82.0 - 102.0 FL    MCH 23.9 (L) 26.1 - 32.9 PG    MCHC 30.4 (L) 31.4 - 35.0 g/dL    RDW 15.9 (H) 11.9 - 14.6 %    Platelets 999 436 - 449 K/uL    MPV 9.8 9.4 - 12.3 FL    nRBC 0.00 0.0 - 0.2 K/uL    Differential Type AUTOMATED      Seg Neutrophils 72 43 - 78 %    Lymphocytes 18 13 - 44 %    Monocytes 9 4.0 - 12.0 %    Eosinophils % 0 (L) 0.5 - 7.8 %    Basophils 0 0.0 - 2.0 %    Immature Granulocytes 1 0.0 - 5.0 %    Segs Absolute 4.6 1.7 - 8.2 K/UL Absolute Lymph # 1.2 0.5 - 4.6 K/UL    Absolute Mono # 0.6 0.1 - 1.3 K/UL    Absolute Eos # 0.0 0.0 - 0.8 K/UL    Basophils Absolute 0.0 0.0 - 0.2 K/UL    Absolute Immature Granulocyte 0.0 0.0 - 0.5 K/UL   Comprehensive Metabolic Panel   Result Value Ref Range    Sodium 139 133 - 143 mmol/L    Potassium 3.8 3.5 - 5.1 mmol/L    Chloride 110 101 - 110 mmol/L    CO2 26 21 - 32 mmol/L    Anion Gap 3 2 - 11 mmol/L    Glucose 163 (H) 65 - 100 mg/dL    BUN 24 (H) 8 - 23 MG/DL    Creatinine 0.71 0.6 - 1.0 MG/DL    Est, Glom Filt Rate >60 >60 ml/min/1.73m2    Calcium 8.8 8.3 - 10.4 MG/DL    Total Bilirubin 0.3 0.2 - 1.1 MG/DL    ALT 18 12 - 65 U/L    AST 13 (L) 15 - 37 U/L    Alk Phosphatase 60 50 - 130 U/L    Total Protein 6.2 (L) 6.3 - 8.2 g/dL    Albumin 3.4 3.2 - 4.6 g/dL    Globulin 2.8 2.8 - 4.5 g/dL    Albumin/Globulin Ratio 1.2 0.4 - 1.6     Urinalysis w rflx microscopic   Result Value Ref Range    Color, UA YELLOW/STRAW      Appearance CLEAR      Specific Gravity, UA 1.014      pH, Urine 7.5      Protein, UA Negative mg/dL    Glucose,  mg/dL    Ketones, Urine 15 mg/dL    Bilirubin Urine Negative      Blood, Urine Negative      Urobilinogen, Urine 0.2 EU/dL    Nitrite, Urine Negative      Leukocyte Esterase, Urine Negative     Transferrin Saturation   Result Value Ref Range    Iron 7 (L) 35 - 150 ug/dL    TIBC 380 250 - 450 ug/dL    TRANSFERRIN SATURATION 2 %   Vitamin B12   Result Value Ref Range    Vitamin B-12 287 193 - 986 pg/mL   Ferritin   Result Value Ref Range    Ferritin 13 8 - 388 NG/ML   Folate   Result Value Ref Range    Folate 44.7 (H) 3.1 - 17.5 ng/mL   Transferrin   Result Value Ref Range    Transferrin 289 202 - 364 mg/dL   Hemoglobin and Hematocrit   Result Value Ref Range    Hemoglobin 7.5 (L) 11.7 - 15.4 g/dL    Hematocrit 24.4 (L) 35.8 - 46.3 %   Hemoglobin and Hematocrit   Result Value Ref Range    Hemoglobin 7.6 (L) 11.7 - 15.4 g/dL    Hematocrit 25.4 (L) 35.8 - 46.3 %   Protime-INR Result Value Ref Range    Protime 14.2 12.6 - 14.3 sec    INR 1.1     APTT   Result Value Ref Range    PTT 22.1 (L) 24.5 - 34.2 SEC   Basic Metabolic Panel w/ Reflex to MG   Result Value Ref Range    Sodium 138 133 - 143 mmol/L    Potassium 3.9 3.5 - 5.1 mmol/L    Chloride 109 101 - 110 mmol/L    CO2 26 21 - 32 mmol/L    Anion Gap 3 2 - 11 mmol/L    Glucose 147 (H) 65 - 100 mg/dL    BUN 13 8 - 23 MG/DL    Creatinine 0.61 0.6 - 1.0 MG/DL    Est, Glom Filt Rate >60 >60 ml/min/1.73m2    Calcium 8.3 8.3 - 10.4 MG/DL   Hemoglobin and Hematocrit   Result Value Ref Range    Hemoglobin 8.0 (L) 11.7 - 15.4 g/dL    Hematocrit 25.8 (L) 35.8 - 46.3 %   Hemoglobin and Hematocrit   Result Value Ref Range    Hemoglobin 7.4 (L) 11.7 - 15.4 g/dL    Hematocrit 24.3 (L) 35.8 - 46.3 %   Hemoglobin and Hematocrit   Result Value Ref Range    Hemoglobin 7.9 (L) 11.7 - 15.4 g/dL    Hematocrit 26.0 (L) 35.8 - 46.3 %   Basic Metabolic Panel   Result Value Ref Range    Sodium 138 133 - 143 mmol/L    Potassium 4.0 3.5 - 5.1 mmol/L    Chloride 108 101 - 110 mmol/L    CO2 25 21 - 32 mmol/L    Anion Gap 5 2 - 11 mmol/L    Glucose 167 (H) 65 - 100 mg/dL    BUN 9 8 - 23 MG/DL    Creatinine 0.61 0.6 - 1.0 MG/DL    Est, Glom Filt Rate >60 >60 ml/min/1.73m2    Calcium 8.5 8.3 - 10.4 MG/DL   Hemoglobin   Result Value Ref Range    Hemoglobin 7.5 (L) 11.7 - 15.4 g/dL   TSH with Reflex   Result Value Ref Range    TSH w Free Thyroid if Abnormal 1.97 0.358 - 3.740 UIU/ML   Hemoglobin   Result Value Ref Range    Hemoglobin 7.8 (L) 11.7 - 15.4 g/dL   Consult to Gastroenterology    VICKEY Dotson NP     1/26/2023  1:28 PM  Please see consult note per Dr Lafleur Parents ,APRN FNP  [facility]    PLEASE READ & DOCUMENT PPD TEST IN 24 HRS   Result Value Ref Range    PPD, (POC) Negative Negative    mm Induration 0 0 - 5 mm   PLEASE READ & DOCUMENT PPD TEST IN 48 HRS   Result Value Ref Range    PPD, (POC) Negative Negative    mm Induration 0 0 - 5 mm   PLEASE READ & DOCUMENT PPD TEST IN 72 HRS   Result Value Ref Range    PPD, (POC) Negative Negative    mm Induration 0 0 - 5 mm   PREPARE RBC (CROSSMATCH), 1 Units   Result Value Ref Range    History Check Historical check performed    TYPE AND SCREEN   Result Value Ref Range    Crossmatch expiration date 01/29/2023,2359     ABO/Rh B NEGATIVE     Antibody Screen POS     Antibody Ident ANTI-C  ANTI-D       Antigen Typing,(RBC) C NEGATIVE,     Weak D NEG     Unit Number [id number]     Product Code Blood Bank RC LR,1     Unit Divison 00     Dispense Status Blood Bank REL FROM [facility]     Crossmatch Result Compatible     Antigen/Antibody C NEGATIVE,     Unit Number [id number]     Product Code Blood Bank RC LR     Unit Divison 00     Dispense Status Blood Bank TRANSFUSED     Crossmatch Result Compatible     Antigen/Antibody C NEGATIVE,         CT HEAD WO CONTRAST   Final Result      1. No acute intracranial abnormality. 2. Chronic small vessel ischemic changes. 3. Chronic maxillary sinus disease. XR CHEST (2 VW)   Final Result   No acute airspace disease. Voice dictation software was used during the making of this note. This software is not perfect and grammatical and other typographical errors may be present. This note has not been completely proofread for errors.        Kaitlynn Nelson DO  01/30/23 6220

## 2023-01-26 NOTE — PROGRESS NOTES
Physical/Occupational Therapy Note:    Attempted to see patient this PM for therapy evaluation session. Patient's hemoglobin 5.5 will hold therapy evaluations until after transfusion. Will follow and re-attempt as schedule permits/patient available.  Thank you,    Anthony Shane, OT    Rehab [facility] Tr[facility]

## 2023-01-26 NOTE — ED NOTES
TRANSFER - OUT REPORT:    Verbal report given to Rito Flor RN on Amy Turner  being transferred to [id number] for routine progression of patient care       Report consisted of patient's Situation, Background, Assessment and   Recommendations(SBAR). Information from the following report(s) Nurse Handoff Report, ED SBAR, Recent Results, and Neuro Assessment were reviewed with the receiving nurse. Lines:       Opportunity for questions and clarification was provided.       Patient transported with:  Sarai Estrada RN  01/26/23 5120

## 2023-01-26 NOTE — ED TRIAGE NOTES
Pt states this am, she had a dizzy sensation with \"sweats\". No nausea reported at the time. Pt did walk to triage without complaints of dizziness.

## 2023-01-26 NOTE — H&P
Hospitalist History and Physical   Admit Date:  2023  9:13 AM   Name:  Hermelinda Hancock   Age:  80 y.o. Sex:  female  :  1932   MRN:  [id number]   Room:  ER02/02    Presenting Complaint: Dizziness     Reason(s) for Admission: GIB (gastrointestinal bleeding) [K92.2]     History of Present Illness:   Hermelinda Hancock is a 80 y.o. female with medical history of HTN, macular degeneration, glaucoma who presented with initial concerns of decreased visual changes, can baseline see shadows with movements. In speaking with the patient, she actually states her vision is baseline and visual changes were from [place] ago\". In looking at her chart, she was seen by Dr. Jennie Guerrero in  for same and placed on DAPT with carotid dopplers ordered. Per his note, was suppose to have re-eval done at that time. Also was seen at [facility]. In the ED labs showed hemoglobin 5.5, FOB +. GI consulted. 1 unit PRBC ordered.       Assessment & Plan:     Principal Problem:    GIB (gastrointestinal bleeding)  Plan: GI consulted  PPI  NPO for now  1 unit PRBC  Q8H H&H  Transfuse keep hemoglobin >7  Hold Plavix/ASA  Anemia panel    Active Problems:    Anemia  Plan: check anemia panel  Continue with plan listed above      Macular degeneration  Plan: noted  Follows with [facility]       Glaucoma  Plan: as listed above      Hypertension  Plan: does not take any meds  Controlled      Dysphagia  Plan: noted      Visual impairment  Plan: as listed above      Anticipated discharge needs:     PT/OT eval    Diet: Diet NPO Exceptions are: Ice Chips  VTE ppx: SCDs  Code status: Prior    Hospital Problems:  Principal Problem:    GIB (gastrointestinal bleeding)  Active Problems:    Anemia    Macular degeneration    Glaucoma    Hypertension    Dysphagia    Visual impairment  Resolved Problems:    Visual changes       Past History:     Past Medical History:   Diagnosis Date    Arthritis     Chest pain 2014    Dysphagia 2014 Edema 9/11/2014    Esophageal dysmotility 9/12/2014    Hypercholesterolemia     Hypertension 9/11/2014    Other ill-defined conditions(799.89)     high cholesterol    Shingles        Past Surgical History:   Procedure Laterality Date    APPENDECTOMY      incidental    COLONOSCOPY  10/03    HYSTERECTOMY (CERVIX STATUS UNKNOWN)          Social History     Tobacco Use    Smoking status: Never    Smokeless tobacco: Never   Substance Use Topics    Alcohol use: Yes      Social History     Substance and Sexual Activity   Drug Use No       Family History   Problem Relation Age of Onset    Breast Cancer Mother 36        Immunization History   Administered Date(s) Administered    COVID-19, MODERNA BLUE border, Primary or Immunocompromised, (age 12y+), IM, 100 mcg/0.5mL 12/31/2020, 01/28/2021    COVID-19, MODERNA Booster BLUE border, (age 18y+), IM, 50mcg/0.25mL 10/23/2021    Influenza, FLUAD, (age 72 y+), Adjuvanted, 0.5mL 09/21/2021, 10/12/2022    Influenza, High Dose (Fluzone 65 yrs and older) 10/12/2016, 09/18/2017, 10/23/2017, 09/23/2018    Influenza, Triv, inactivated, subunit, adjuvanted, IM (Fluad 65 yrs and older) 08/29/2019    Pneumococcal Conjugate 13-valent ([id number]) 07/13/2015    Pneumococcal Polysaccharide (Oifdgodue68) 01/01/2007    Td vaccine (adult) 07/26/2008    Zoster Recombinant (Shingrix) 05/10/2018     Allergies   Allergen Reactions    Atorvastatin Other (See Comments)     Muscle aches    Ezetimibe-Simvastatin Other (See Comments)     Hair loss    Sulfa Antibiotics Rash     Prior to Admit Medications:  Current Outpatient Medications   Medication Instructions    aspirin 81 mg, Oral, DAILY    clopidogrel (PLAVIX) 75 mg, Oral, DAILY    furosemide (LASIX) 20 MG tablet 1 qod for fluid    vitamin D 1,000 Units, Oral, DAILY    zoledronic acid (RECLAST) 5 mg, IntraVENous, ONCE         Objective:   Patient Vitals for the past 24 hrs:   Temp Pulse Resp BP SpO2   01/26/23 0908 97.6 °F (36.4 °C) 80 16 (!) 130/57 97 %       Oxygen Therapy  SpO2: 97 %    Estimated body mass index is 22.86 kg/m² as calculated from the following:    Height as of this encounter: 5' 1\" (1.549 m). Weight as of this encounter: 121 lb (54.9 kg). No intake or output data in the 24 hours ending 01/26/23 1254      Physical Exam:  Blood pressure (!) 130/57, pulse 80, temperature 97.6 °F (36.4 °C), temperature source Oral, resp. rate 16, height 5' 1\" (1.549 m), weight 121 lb (54.9 kg), SpO2 97 %. General:    Well nourished. Head:  Normocephalic, atraumatic  Eyes:  Sclerae appear normal.  Pupils equally round. ENT:  Nares appear normal.  Moist oral mucosa  Neck:  No restricted ROM. Trachea midline   CV:   RRR. No m/r/g. No jugular venous distension. Lungs:   CTAB. No wheezing, rhonchi, or rales. Symmetric expansion. Abdomen:   Soft, nontender, nondistended. Extremities: No cyanosis or clubbing. No edema  Skin:     No rashes, pale coloration. Warm and dry. Neuro:  CN II-XII grossly intact. Sensation intact. Psych:  Normal mood and affect.       I have personally reviewed labs and tests:  Recent Labs:  Recent Results (from the past 24 hour(s))   CBC with Auto Differential    Collection Time: 01/26/23 11:32 AM   Result Value Ref Range    WBC 6.4 4.3 - 11.1 K/uL    RBC 2.30 (L) 4.05 - 5.2 M/uL    Hemoglobin 5.5 (LL) 11.7 - 15.4 g/dL    Hematocrit 18.1 (L) 35.8 - 46.3 %    MCV 78.7 (L) 82.0 - 102.0 FL    MCH 23.9 (L) 26.1 - 32.9 PG    MCHC 30.4 (L) 31.4 - 35.0 g/dL    RDW 15.9 (H) 11.9 - 14.6 %    Platelets 211 610 - 579 K/uL    MPV 9.8 9.4 - 12.3 FL    nRBC 0.00 0.0 - 0.2 K/uL    Differential Type AUTOMATED      Seg Neutrophils 72 43 - 78 %    Lymphocytes 18 13 - 44 %    Monocytes 9 4.0 - 12.0 %    Eosinophils % 0 (L) 0.5 - 7.8 %    Basophils 0 0.0 - 2.0 %    Immature Granulocytes 1 0.0 - 5.0 %    Segs Absolute 4.6 1.7 - 8.2 K/UL    Absolute Lymph # 1.2 0.5 - 4.6 K/UL    Absolute Mono # 0.6 0.1 - 1.3 K/UL    Absolute Eos # 0.0 0.0 - 0.8 K/UL    Basophils Absolute 0.0 0.0 - 0.2 K/UL    Absolute Immature Granulocyte 0.0 0.0 - 0.5 K/UL   Comprehensive Metabolic Panel    Collection Time: 01/26/23 11:32 AM   Result Value Ref Range    Sodium 139 133 - 143 mmol/L    Potassium 3.8 3.5 - 5.1 mmol/L    Chloride 110 101 - 110 mmol/L    CO2 26 21 - 32 mmol/L    Anion Gap 3 2 - 11 mmol/L    Glucose 163 (H) 65 - 100 mg/dL    BUN 24 (H) 8 - 23 MG/DL    Creatinine 0.71 0.6 - 1.0 MG/DL    Est, Glom Filt Rate >60 >60 ml/min/1.73m2    Calcium 8.8 8.3 - 10.4 MG/DL    Total Bilirubin 0.3 0.2 - 1.1 MG/DL    ALT 18 12 - 65 U/L    AST 13 (L) 15 - 37 U/L    Alk Phosphatase 60 50 - 130 U/L    Total Protein 6.2 (L) 6.3 - 8.2 g/dL    Albumin 3.4 3.2 - 4.6 g/dL    Globulin 2.8 2.8 - 4.5 g/dL    Albumin/Globulin Ratio 1.2 0.4 - 1.6         I have personally reviewed imaging studies:  XR CHEST (2 VW)    Result Date: 1/26/2023  EXAMINATION: XR CHEST (2 VW) 1/26/2023 10:20 AM ACCESSION NUMBER: [id number] COMPARISON: Chest radiograph 1/19/2010. INDICATION: Shortness of breath. TECHNIQUE: PA and lateral views of the chest were obtained. FINDINGS: No focal consolidation. No pulmonary edema. No pneumothorax or sizable pleural effusion. Stable cardiomediastinal silhouette. Aortic arch atherosclerotic calcifications. No acute airspace disease. CT HEAD WO CONTRAST    Result Date: 1/26/2023  EXAMINATION: CT HEAD WO CONTRAST 1/26/2023 10:37 AM ACCESSION NUMBER: [id number] COMPARISON: CT head 9/17/2015, 7/26/2008. INDICATION: vision changes TECHNIQUE: Multiple-row detector helical CT examination of the head without intravenous contrast. Radiation dose reduction techniques were used for this study. Our CT scanners use one or all of the following: Automated exposure control, adjustment of the mA and/or kV according to patient size, iterative reconstruction. FINDINGS:  There is no midline shift or mass lesion. There is no evidence of intracranial hemorrhage or acute infarct. Periventricular white matter hypodensities, which are nonspecific but, associated with chronic small vessel ischemic changes. Prominence of the ventricles and CSF containing spaces, compatible with mild cerebral volume loss. Postsurgical changes of the bilateral globes. The orbits are unremarkable. No fractures are evident. Complete opacification of the right maxillary sinus with thickening of the maxillary sinus wall. Left maxillary sinus mucosal thickening. Remaining paranasal sinuses are pneumatized and free of fluid. 1. No acute intracranial abnormality. 2. Chronic small vessel ischemic changes. 3. Chronic maxillary sinus disease. Echocardiogram:  No results found for this or any previous visit.         Orders Placed This Encounter   Medications    0.9 % sodium chloride infusion    sodium chloride flush 0.9 % injection 5-40 mL    sodium chloride flush 0.9 % injection 5-40 mL    0.9 % sodium chloride infusion    OR Linked Order Group     ondansetron (ZOFRAN-ODT) disintegrating tablet 4 mg     ondansetron (ZOFRAN) injection 4 mg    OR Linked Order Group     acetaminophen (TYLENOL) tablet 650 mg     acetaminophen (TYLENOL) suppository 650 mg    pantoprazole (PROTONIX) 40 mg in sodium chloride (PF) 0.9 % 10 mL injection    0.9 % sodium chloride bolus    iopamidol (ISOVUE-370) 76 % injection 50 mL    sodium chloride flush 0.9 % injection 10 mL         Signed:  VICKEY Yeager - JULIO

## 2023-01-26 NOTE — CONSULTS
[facility] Consult Note    Referring Physician:  ED staff    Consult Date:  1/26/2023    Admit Date:  1/26/2023    Chief Complaint:  AGNES    Subjective:     History of Present Illness:  Patient is a 80 y.o. WF who is seen in consultation at the request of ED staff for anemia, (Hg 5.5 MCV 78). She is blind and cannot see the color of stool to assess for blood. She is treated for GERD with protonix by Brodie Martines but hasn't had EGD. Last colonoscopy was with Dr. Ant Bruno about 10 years ago. She knows of no hx of colon polyps. No recent diarrhea, abd pain, change in BH's or worsening reflux. No dysphagia, but she saw Ruiz Reveal in 2014 for dysphagia and Ba swallow suggested dysmotility. An EGD was suggested but never done. She is on PLAVIX AND ASA, LAST DOSE YESTERDAY. She has had SOB and RUSSELL but cannot say for how long. No CP. PMH:  Past Medical History:   Diagnosis Date    Arthritis     Chest pain 9/11/2014    Dysphagia 9/11/2014    Edema 9/11/2014    Esophageal dysmotility 9/12/2014    Hypercholesterolemia     Hypertension 9/11/2014    Other ill-defined conditions(799.89)     high cholesterol    Shingles        PSH:  Past Surgical History:   Procedure Laterality Date    APPENDECTOMY      incidental    COLONOSCOPY  10/03    HYSTERECTOMY (CERVIX STATUS UNKNOWN)         Allergies: Allergies   Allergen Reactions    Atorvastatin Other (See Comments)     Muscle aches    Ezetimibe-Simvastatin Other (See Comments)     Hair loss    Sulfa Antibiotics Rash       Home Medications:  Prior to Admission medications    Medication Sig Start Date End Date Taking?  Authorizing Provider   furosemide (LASIX) 20 MG tablet 1 qod for fluid 11/8/22   W Marta Liu MD   zoledronic acid (RECLAST) 5 MG/100ML SOLN Infuse 100 mLs intravenously once for 1 dose 8/23/22 11/8/22  W Marta Liu MD   clopidogrel (PLAVIX) 75 MG tablet Take 1 tablet by mouth daily 8/23/22   W Marta Liu MD   aspirin 81 MG EC tablet Take 81 mg by mouth daily    Ar Automatic Reconciliation   vitamin D 25 MCG (1000 UT) CAPS Take 1,000 Units by mouth daily    Ar Automatic Reconciliation       Hospital Medications:  Current Facility-Administered Medications   Medication Dose Route Frequency    0.9 % sodium chloride infusion   IntraVENous PRN    sodium chloride flush 0.9 % injection 5-40 mL  5-40 mL IntraVENous 2 times per day    sodium chloride flush 0.9 % injection 5-40 mL  5-40 mL IntraVENous PRN    0.9 % sodium chloride infusion   IntraVENous PRN    ondansetron (ZOFRAN-ODT) disintegrating tablet 4 mg  4 mg Oral Q8H PRN    Or    ondansetron (ZOFRAN) injection 4 mg  4 mg IntraVENous Q6H PRN    acetaminophen (TYLENOL) tablet 650 mg  650 mg Oral Q6H PRN    Or    acetaminophen (TYLENOL) suppository 650 mg  650 mg Rectal Q6H PRN    pantoprazole (PROTONIX) 40 mg in sodium chloride (PF) 0.9 % 10 mL injection  40 mg IntraVENous Q12H    0.9 % sodium chloride bolus  100 mL IntraVENous ONCE PRN    iopamidol (ISOVUE-370) 76 % injection 50 mL  50 mL IntraVENous ONCE PRN    sodium chloride flush 0.9 % injection 10 mL  10 mL IntraVENous ONCE PRN     Current Outpatient Medications   Medication Sig    furosemide (LASIX) 20 MG tablet 1 qod for fluid    zoledronic acid (RECLAST) 5 MG/100ML SOLN Infuse 100 mLs intravenously once for 1 dose    clopidogrel (PLAVIX) 75 MG tablet Take 1 tablet by mouth daily    aspirin 81 MG EC tablet Take 81 mg by mouth daily    vitamin D 25 MCG (1000 UT) CAPS Take 1,000 Units by mouth daily       Social History:  Social History    SHE IS LEGALLY BLIND,  AND LIVES ALONE. HER 62 Y/O SON WAS HER CARE GIVER BUT HE TRAGICALLY  ON . SHE HAS FRIENDS WHO HELP HER AND TAKE HER TO [employer]  Tobacco Use    Smoking status: Never    Smokeless tobacco: Never   Substance Use Topics    Alcohol use: Yes     No history of drug, alcohol or tobacco abuse.     Family History:  Family History   Problem Relation Age of Onset    Breast Cancer Mother 36     No reported history of GI malignancies in first degree relatives. Review of Systems:  A detailed 10 system ROS is obtained, with pertinent positives as listed above. All others are negative. Objective:     Physical Exam:  Vitals:  BP (!) 130/57   Pulse 80   Temp 97.6 °F (36.4 °C) (Oral)   Resp 16   Ht 5' 1\" (1.549 m)   Wt 121 lb (54.9 kg)   SpO2 97%   BMI 22.86 kg/m²   Gen:  Pt is alert, cooperative, no acute distress  Skin:  Extremities and face reveal no rashes. No miller erythema. No telangiectasias on the chest wall. HEENT: Sclerae anicteric. Extra-occular muscles are intact. No oral ulcers. No abnormal pigmentation of the lips. The neck is supple. Cardiovascular: Regular rate and rhythm. No murmurs, gallops, or rubs. Respiratory:  Comfortable breathing with no accessory muscle use. Clear breath sounds anteriorly with no wheezes, rales, or rhonchi. GI:  Abdomen nondistended, soft, and nontender. Normal active bowel sounds. No enlargement of the liver or spleen. No masses palpable. Rectal:  Deferred  Musculoskeletal:  No pitting edema of the lower legs. Extremities have good range of motion. No costovertebral tenderness. Neurological:  Gross memory appears intact. Patient is alert and oriented. Psychiatric:  Mood appears appropriate with judgement intact. Lymphatic:  No cervical or supraclavicular adenopathy. Laboratory:    Recent Labs     01/26/23  1132   WBC 6.4   HGB 5.5*   HCT 18.1*      MCV 78.7*      K 3.8      CO2 26   BUN 24*   ALT 18       Assessment:       Principal Problem:    GIB  Active Problems:    Anemia    Macular degeneration    Glaucoma    Hypertension    Dysphagia    Visual impairment  Resolved Problems:    Visual changes      Plan:       SHE WILL NEED GI W/U TO ASSESS FOR AN OCCULT SOURCE OF GIB AND AGNES.   WE WILL NEED TO WAIT AT LEAST 5 DAYS OFF PLAVIX AND ASA  WE CAN OBSERVE WHILE HERE FOR SIGNS OF BLOOD IN HER STOOL\  WILL EVALUATE WITH EGD FOR HX OF GERD ON PPI  CONTINUE PROTONIX WHILE HERE  PREP AND PERFORM COLONOSCOPY HERE AS SHE LIVES ALONE AND IS LEGALLY BLIND.   MD Claude Murguia MD

## 2023-01-27 LAB
ANION GAP SERPL CALC-SCNC: 3 MMOL/L (ref 2–11)
APTT PPP: 22.1 SEC (ref 24.5–34.2)
BUN SERPL-MCNC: 13 MG/DL (ref 8–23)
CALCIUM SERPL-MCNC: 8.3 MG/DL (ref 8.3–10.4)
CHLORIDE SERPL-SCNC: 109 MMOL/L (ref 101–110)
CO2 SERPL-SCNC: 26 MMOL/L (ref 21–32)
CREAT SERPL-MCNC: 0.61 MG/DL (ref 0.6–1)
GLUCOSE SERPL-MCNC: 147 MG/DL (ref 65–100)
HCT VFR BLD AUTO: 24.3 % (ref 35.8–46.3)
HCT VFR BLD AUTO: 24.4 % (ref 35.8–46.3)
HCT VFR BLD AUTO: 25.4 % (ref 35.8–46.3)
HCT VFR BLD AUTO: 25.8 % (ref 35.8–46.3)
HGB BLD-MCNC: 7.4 G/DL (ref 11.7–15.4)
HGB BLD-MCNC: 7.5 G/DL (ref 11.7–15.4)
HGB BLD-MCNC: 7.6 G/DL (ref 11.7–15.4)
HGB BLD-MCNC: 8 G/DL (ref 11.7–15.4)
INR PPP: 1.1
POTASSIUM SERPL-SCNC: 3.9 MMOL/L (ref 3.5–5.1)
PROTHROMBIN TIME: 14.2 SEC (ref 12.6–14.3)
SODIUM SERPL-SCNC: 138 MMOL/L (ref 133–143)

## 2023-01-27 PROCEDURE — A4216 STERILE WATER/SALINE, 10 ML: HCPCS | Performed by: NURSE PRACTITIONER

## 2023-01-27 PROCEDURE — 6360000002 HC RX W HCPCS: Performed by: NURSE PRACTITIONER

## 2023-01-27 PROCEDURE — 85014 HEMATOCRIT: CPT

## 2023-01-27 PROCEDURE — 2580000003 HC RX 258: Performed by: NURSE PRACTITIONER

## 2023-01-27 PROCEDURE — C9113 INJ PANTOPRAZOLE SODIUM, VIA: HCPCS | Performed by: NURSE PRACTITIONER

## 2023-01-27 PROCEDURE — 85730 THROMBOPLASTIN TIME PARTIAL: CPT

## 2023-01-27 PROCEDURE — 97161 PT EVAL LOW COMPLEX 20 MIN: CPT

## 2023-01-27 PROCEDURE — 85610 PROTHROMBIN TIME: CPT

## 2023-01-27 PROCEDURE — 96376 TX/PRO/DX INJ SAME DRUG ADON: CPT

## 2023-01-27 PROCEDURE — 36415 COLL VENOUS BLD VENIPUNCTURE: CPT

## 2023-01-27 PROCEDURE — G0378 HOSPITAL OBSERVATION PER HR: HCPCS

## 2023-01-27 PROCEDURE — 97165 OT EVAL LOW COMPLEX 30 MIN: CPT

## 2023-01-27 PROCEDURE — 2500000003 HC RX 250 WO HCPCS: Performed by: INTERNAL MEDICINE

## 2023-01-27 PROCEDURE — 97535 SELF CARE MNGMENT TRAINING: CPT

## 2023-01-27 PROCEDURE — 97530 THERAPEUTIC ACTIVITIES: CPT

## 2023-01-27 PROCEDURE — 80048 BASIC METABOLIC PNL TOTAL CA: CPT

## 2023-01-27 RX ORDER — POLYETHYLENE GLYCOL 3350 17 G/17G
238 POWDER, FOR SOLUTION ORAL ONCE
Status: DISCONTINUED | OUTPATIENT
Start: 2023-01-29 | End: 2023-01-28

## 2023-01-27 RX ADMIN — SODIUM CHLORIDE, PRESERVATIVE FREE 40 MG: 5 INJECTION INTRAVENOUS at 12:35

## 2023-01-27 RX ADMIN — SODIUM CHLORIDE, PRESERVATIVE FREE 40 MG: 5 INJECTION INTRAVENOUS at 00:22

## 2023-01-27 RX ADMIN — TUBERCULIN PURIFIED PROTEIN DERIVATIVE 5 UNITS: 5 INJECTION, SOLUTION INTRADERMAL at 12:36

## 2023-01-27 RX ADMIN — SODIUM CHLORIDE, PRESERVATIVE FREE 10 ML: 5 INJECTION INTRAVENOUS at 21:20

## 2023-01-27 ASSESSMENT — PAIN SCALES - GENERAL
PAINLEVEL_OUTOF10: 0
PAINLEVEL_OUTOF10: 0

## 2023-01-27 NOTE — CARE COORDINATION
Note below was copied from MD for continuity of care. .... ED Course User Index  [KH] Danae Chaves,              Orders Placed This Encounter   Procedures    Critical Care    CT HEAD WO CONTRAST    XR CHEST (2 VW)    CBC with Auto Differential    Comprehensive Metabolic Panel    Urinalysis w rflx microscopic    Hemoglobin and Hematocrit    Verify hospital blood product consent form has been signed and witnessed    Vital Signs For Blood Product Transfusion    Transfusion Reaction Management    TYPE AND SCREEN    PREPARE RBC (CROSSMATCH), 1 Units         Medications   0.9 % sodium chloride infusion (has no administration in time range)             New Prescriptions     No medications on file         Nataliia Teresa is a 80 y.o. female who presents to the Emergency Department with chief complaint of        Chief Complaint   Patient presents with    Dizziness      A 75-year-old female presenting to the emergency department today complaining of decreased vision from baseline impairment. Patient says she got up at 7:00 and could not see anything at all. Patient states normally she can only see shadows secondary to advanced macular degeneration and glaucoma. The patient said that the loss of vision lasted for about 20 minutes and for about 10 minutes during that episode she felt sweaty. The patient said she was grasping around looking for the walls and felt off balance because she could not see. The patient denied any dizziness. She denied headache. The patient said that symptoms resolved. She called her son and by 1 when he got there she looked pale but says she was feeling better and now she is back to baseline. The patient denied any chest pain abdominal pain nausea or vomiting. She says that she has been feeling a little short of breath the last 2 days.         01/26/23 3179   Service Assessment   Patient Orientation Alert and Oriented   Cognition Alert   History Provided By Patient   Primary Caregiver Self   Support Systems Family Members;Friends/Neighbors  (dtr-in-law/Belinda #[phone])   Patient's Healthcare Decision Maker is: Named in [address] [place] [address]   PCP Verified by CM Yes   Prior Functional Level Independent in ADLs/IADLs   Current Functional Level Independent in ADLs/IADLs   Can patient return to prior living arrangement Yes   Ability to make needs known: Good   Family able to assist with home care needs: Yes   Would you like for me to discuss the discharge plan with any other family members/significant others, and if so, who? No   Financial Resources Medicare   Community Resources None   Social/Functional History   Lives With Alone   Type of [address] One level   Discharge Planning   Type of [place] Alone   Current Services Prior To Admission Other (Comment)  (freddie)   [address]   Type of [address] Housekeeping   Patient expects to be discharged to: House   Pt with dtr-in-law at bedside, she is inde w/ADL's, but is legally blind. States has lives int he same home for over 60 yrs and can \"get around just fine\". Family assist with meals and transportation. [facility] at [facility] [address] Per MD pt may be medically ready for d/c on 1/29. PT/OT /PPD ordered. MOON letter done. CM to follow.

## 2023-01-27 NOTE — PROGRESS NOTES
ACUTE OCCUPATIONAL THERAPY GOALS:   (Developed with and agreed upon by patient and/or caregiver.)  Pt will be supervision to  (I) with ADL's and ambulated short distances. OCCUPATIONAL THERAPY Initial Assessment, Daily Note, Discharge, and AM          Acknowledge Orders  Time  OT Charge Capture  Rehab Caseload Tracker      Avelina Colbert is a 80 y.o. female   PRIMARY DIAGNOSIS: GIB (gastrointestinal bleeding)  GIB (gastrointestinal bleeding) [K92.2]  Transient vision disturbance [H53.9]  Gastrointestinal hemorrhage, unspecified gastrointestinal hemorrhage type [K92.2]  Anemia, unspecified type [D64.9]       Reason for Referral: Generalized Muscle Weakness (M62.81)  Observation: Payor: MEDICARE / Plan: MEDICARE PART A AND B / Product Type: *No Product type* /     ASSESSMENT:     REHAB RECOMMENDATIONS:   Recommendation to date pending progress:  Setting:  No further skilled therapy after discharge from hospital    Equipment:    None     ASSESSMENT:  Ms. Charlotte Boyer was admitted with above diagnosis. She got blood and as of this morning her HGB was 8.0. Pt was seen in room with son and daughter in law present. Pt moves well with verbal cues for vision loss only. Pt currently has not deficits and should do well at home with continue family and [employer] family support. Pt up to edge of bed, doffed socks, donned shoes, to bathroom for toileting and grooming at sink. Pt ambulated community distance and returned to room and set up in recliner with all needs. Pt left with family present.  No further OT warranted      [employer] AM-PAC 6 Clicks Daily Activity Inpatient Short Form:    AM-PAC Daily Activity Inpatient   How much help for putting on and taking off regular lower body clothing?: None  How much help for Bathing?: None  How much help for Toileting?: None  How much help for putting on and taking off regular upper body clothing?: None  How much help for taking care of personal grooming?: None  How much help for eating meals?: None  AM-[facility] Inpatient Daily Activity Raw Score: 24  AM-PAC Inpatient ADL T-Scale Score : 57.54  ADL Inpatient CMS 0-100% Score: 0  ADL Inpatient CMS G-Code Modifier : CH           SUBJECTIVE:     Ms. Andrade Jackson states, \"I can see light and dark\"     Social/Functional Lives With: Alone  Type of Home: House  Home Layout: One level  Home Access: Ramped entrance  Bathroom Shower/Tub: Tub/Shower unit    OBJECTIVE:     LINES / Jose Butter / AIRWAY: None    RESTRICTIONS/PRECAUTIONS:       PAIN: VITALS / O2:   Pre Treatment:          Post Treatment: none, heel soreness got better after walking pt educated to float heels in bed.         Vitals          Oxygen            GROSS EVALUATION: INTACT IMPAIRED   (See Comments)   UE AROM [x] []   UE PROM [x] []   Strength [x]       Posture / Balance [x]     Sensation [x]     Coordination [x]       Tone [x]       Edema []    Activity Tolerance [x]       Hand Dominance R [] L []      COGNITION/  PERCEPTION: INTACT IMPAIRED   (See Comments)   Orientation [x]     Vision [x]     Hearing [x]     Cognition  [x]     Perception [x]       MOBILITY: I Mod I S SBA CGA Min Mod Max Total  NT x2 Comments:   Bed Mobility    Rolling [] [] [] [] [] [] [] [] [] [] []    Supine to Sit [] [] [x] [] [] [] [] [] [] [] []    Scooting [] [] [x] [] [] [] [] [] [] [] []    Sit to Supine [] [] [] [] [] [] [] [] [] [] []    Transfers    Sit to Stand [] [] [x] [] [] [] [] [] [] [] []    Bed to Chair [] [] [x] [] [] [] [] [] [] [] []    Stand to Sit [] [] [x] [] [] [] [] [] [] [] []    Tub/Shower [] [] [x] [] [] [] [] [] [] [] []     Toilet [] [] [x] [] [] [] [] [] [] [] []      [] [] [] [] [] [] [] [] [] [] []    I=Independent, Mod I=Modified Independent, S=Supervision/Setup, SBA=Standby Assistance, CGA=Contact Guard Assistance, Min=Minimal Assistance, Mod=Moderate Assistance, Max=Maximal Assistance, Total=Total Assistance, NT=Not Tested    ACTIVITIES OF DAILY LIVING: I Mod I S SBA CGA Min Mod Max Total NT Comments   BASIC ADLs:              Upper Body Bathing  [] [] [x] [] [] [] [] [] [] []    Lower Body Bathing [] [] [x] [] [] [] [] [] [] []    Toileting [] [] [x] [] [] [] [] [] [] []    Upper Body Dressing [] [] [x] [] [] [] [] [] [] []    Lower Body Dressing [] [] [x] [] [] [] [] [] [] []    Feeding [x] [] [] [] [] [] [] [] [] []    Grooming [] [] [x] [] [] [] [] [] [] []    Personal Device Care [] [] [] [] [] [] [] [] [] []    Functional Mobility [] [] [x] [] [] [] [] [] [] []    I=Independent, Mod I=Modified Independent, S=Supervision/Setup, SBA=Standby Assistance, CGA=Contact Guard Assistance, Min=Minimal Assistance, Mod=Moderate Assistance, Max=Maximal Assistance, Total=Total Assistance, NT=Not Tested    PLAN:   FREQUENCY/DURATION   OT Plan of Care:  (1 treatment) for duration of hospital stay or until stated goals are met, whichever comes first.    PROBLEM LIST:   (Skilled intervention is medically necessary to address:)  Decreased Activity Tolerance   INTERVENTIONS PLANNED:  (Benefits and precautions of occupational therapy have been discussed with the patient.)  Self Care Training  Education         TREATMENT:     EVALUATION: LOW COMPLEXITY: (Untimed Charge)    TREATMENT:   Self Care: (30 min): Procedure(s) (per grid) utilized to improve and/or restore self-care/home management as related to dressing, toileting, grooming, and functional mobility . Required minimal verbal cues for vision loss only cueing to facilitate activities of daily living skills. AFTER TREATMENT PRECAUTIONS: Bed/Chair Locked, Call light within reach, Chair, Needs within reach, RN notified, and Visitors at bedside    INTERDISCIPLINARY COLLABORATION:  RN/ PCT, PT/ PTA, and OT/ ARZATE    EDUCATION:  Education Given To: Patient; Family  Education Provided: Role of Therapy;Plan of Care;IADL Safety; Fall Prevention Strategies  Education Outcome: Verbalized understanding;Demonstrated understanding    TOTAL TREATMENT DURATION AND TIME:  Time In: 1100  Time Out: [facility]  Minutes: [address], OT

## 2023-01-27 NOTE — PLAN OF CARE
Problem: Discharge Planning  Goal: Discharge to home or other facility with appropriate resources  Outcome: Progressing  Flowsheets (Taken 1/27/2023 4933)  Discharge to home or other facility with appropriate resources:   Identify barriers to discharge with patient and caregiver   Arrange for needed discharge resources and transportation as appropriate   Identify discharge learning needs (meds, wound care, etc)     Problem: Safety - Adult  Goal: Free from fall injury  Outcome: Progressing     Problem: ABCDS Injury Assessment  Goal: Absence of physical injury  Outcome: Progressing     Problem: Pain  Goal: Verbalizes/displays adequate comfort level or baseline comfort level  Outcome: Progressing

## 2023-01-27 NOTE — PROGRESS NOTES
Hospitalist Progress Note   Admit Date:  2023  9:13 AM   Name:  Dianelys Posey   Age:  80 y.o. Sex:  female  :  1932   MRN:  [id number]   Room:  [facility]/    Presenting Complaint: Dizziness     Reason(s) for Admission: GIB (gastrointestinal bleeding) [K92.2]  Transient vision disturbance [H53.9]  Gastrointestinal hemorrhage, unspecified gastrointestinal hemorrhage type [K92.2]  Anemia, unspecified type [D64.9]     Hospital Course:   Dianelys Posey is a 80 y.o. female with medical history of macular degeneration causing legal blindness, HTN, GERD, dysphagia who presented with concerns of decrease visual changes however found to have Hg of 5.5 and a GIB. She has since had PRBC infusions with some continued janae blood in her stool. Subjective & 24hr Events (23): Had a small bloody BM this AM. Otherwise she has no complaints. Tolerating a cl liq diet. Assessment & Plan:     Principal Problem:    GIB (gastrointestinal bleeding)  Anemia  - plan for EGD and colonoscopy on Monday as she needs Plavix washout  - continue cl liq diet, PPI, monitor stools  - transfuse if Hg <7, Hg q8h      Macular degeneration    Glaucoma    Visual impairment  - at baseline; have asked nurses to inspect stools as pt is unable  - continue home meds      Hypertension, at baseline      Dysphagia  - as above      Anticipated discharge needs:    - PT/OT has seen. Her son and daughter in law are in town from [facility]. She does not require any home services at this time    Diet:  ADULT DIET; Clear Liquid;  No red dye  DVT PPx: SCDs  Code status: Prior    Hospital Problems:  Principal Problem:    GIB (gastrointestinal bleeding)  Active Problems:    Anemia    Macular degeneration    Glaucoma    Hypertension    Dysphagia    Visual impairment  Resolved Problems:    Visual changes      Objective:   Patient Vitals for the past 24 hrs:   Temp Pulse Resp BP SpO2   23 0707 98.1 °F (36.7 °C) 77 17 (!) 164/63 97 %   23 0358 97.9 °F (36.6 °C) 80 16 (!) 157/68 97 %   01/27/23 0000 98.2 °F (36.8 °C) 75 16 (!) 148/51 99 %   01/26/23 2341 98.4 °F (36.9 °C) 70 16 (!) 150/58 99 %   01/26/23 2130 -- -- -- (!) 168/53 --   01/26/23 2125 -- -- -- (!) 176/59 --   01/26/23 2107 98.5 °F (36.9 °C) 84 18 (!) 180/66 99 %   01/26/23 2052 98.1 °F (36.7 °C) 81 16 (!) 162/63 98 %   01/26/23 2037 98.3 °F (36.8 °C) 85 16 (!) 158/62 97 %   01/26/23 2005 98.2 °F (36.8 °C) 80 16 (!) 150/64 98 %   01/26/23 1400 97.7 °F (36.5 °C) 81 16 (!) 158/62 97 %   01/26/23 1318 -- 83 16 (!) 164/62 99 %   01/26/23 1300 -- 84 16 (!) 156/67 99 %   01/26/23 0908 97.6 °F (36.4 °C) 80 16 (!) 130/57 97 %       Oxygen Therapy  SpO2: 97 %  O2 Device: None (Room air)    Estimated body mass index is 22.86 kg/m² as calculated from the following:    Height as of this encounter: 5' 1\" (1.549 m). Weight as of this encounter: 121 lb (54.9 kg). Intake/Output Summary (Last 24 hours) at 1/27/2023 0831  Last data filed at 1/27/2023 0000  Gross per 24 hour   Intake 387.92 ml   Output 200 ml   Net 187.92 ml         Physical Exam:   Blood pressure (!) 164/63, pulse 77, temperature 98.1 °F (36.7 °C), temperature source Oral, resp. rate 17, height 5' 1\" (1.549 m), weight 121 lb (54.9 kg), SpO2 97 %. General:    Well nourished. Head:  Normocephalic, atraumatic  Eyes:  Sclerae appear normal.  Pupils equally round. ENT:  Nares appear normal.  Moist oral mucosa  Neck:  No restricted ROM. Trachea midline   CV:   RRR. No m/r/g. No jugular venous distension. Lungs:   CTAB. No wheezing, rhonchi, or rales. Symmetric expansion. Abdomen:   Soft, nontender, nondistended. Extremities: No cyanosis or clubbing. No edema  Skin:     No rashes and normal coloration. Warm and dry. Neuro:  CN II-XII grossly intact. Psych:  Normal mood and affect.       I have personally reviewed labs and tests:  Recent Labs:  Recent Results (from the past 48 hour(s))   CBC with Auto Differential Collection Time: 01/26/23 11:32 AM   Result Value Ref Range    WBC 6.4 4.3 - 11.1 K/uL    RBC 2.30 (L) 4.05 - 5.2 M/uL    Hemoglobin 5.5 (LL) 11.7 - 15.4 g/dL    Hematocrit 18.1 (L) 35.8 - 46.3 %    MCV 78.7 (L) 82.0 - 102.0 FL    MCH 23.9 (L) 26.1 - 32.9 PG    MCHC 30.4 (L) 31.4 - 35.0 g/dL    RDW 15.9 (H) 11.9 - 14.6 %    Platelets 939 454 - 234 K/uL    MPV 9.8 9.4 - 12.3 FL    nRBC 0.00 0.0 - 0.2 K/uL    Differential Type AUTOMATED      Seg Neutrophils 72 43 - 78 %    Lymphocytes 18 13 - 44 %    Monocytes 9 4.0 - 12.0 %    Eosinophils % 0 (L) 0.5 - 7.8 %    Basophils 0 0.0 - 2.0 %    Immature Granulocytes 1 0.0 - 5.0 %    Segs Absolute 4.6 1.7 - 8.2 K/UL    Absolute Lymph # 1.2 0.5 - 4.6 K/UL    Absolute Mono # 0.6 0.1 - 1.3 K/UL    Absolute Eos # 0.0 0.0 - 0.8 K/UL    Basophils Absolute 0.0 0.0 - 0.2 K/UL    Absolute Immature Granulocyte 0.0 0.0 - 0.5 K/UL   Comprehensive Metabolic Panel    Collection Time: 01/26/23 11:32 AM   Result Value Ref Range    Sodium 139 133 - 143 mmol/L    Potassium 3.8 3.5 - 5.1 mmol/L    Chloride 110 101 - 110 mmol/L    CO2 26 21 - 32 mmol/L    Anion Gap 3 2 - 11 mmol/L    Glucose 163 (H) 65 - 100 mg/dL    BUN 24 (H) 8 - 23 MG/DL    Creatinine 0.71 0.6 - 1.0 MG/DL    Est, Glom Filt Rate >60 >60 ml/min/1.73m2    Calcium 8.8 8.3 - 10.4 MG/DL    Total Bilirubin 0.3 0.2 - 1.1 MG/DL    ALT 18 12 - 65 U/L    AST 13 (L) 15 - 37 U/L    Alk Phosphatase 60 50 - 130 U/L    Total Protein 6.2 (L) 6.3 - 8.2 g/dL    Albumin 3.4 3.2 - 4.6 g/dL    Globulin 2.8 2.8 - 4.5 g/dL    Albumin/Globulin Ratio 1.2 0.4 - 1.6     PREPARE RBC (CROSSMATCH), 1 Units    Collection Time: 01/26/23 12:15 PM   Result Value Ref Range    History Check Historical check performed    Transferrin Saturation    Collection Time: 01/26/23 12:59 PM   Result Value Ref Range    Iron 7 (L) 35 - 150 ug/dL    TIBC 380 250 - 450 ug/dL    TRANSFERRIN SATURATION 2 %   Vitamin B12    Collection Time: 01/26/23 12:59 PM   Result Value Ref Range    Vitamin B-12 287 193 - 986 pg/mL   Ferritin    Collection Time: 01/26/23 12:59 PM   Result Value Ref Range    Ferritin 13 8 - 388 NG/ML   Folate    Collection Time: 01/26/23 12:59 PM   Result Value Ref Range    Folate 44.7 (H) 3.1 - 17.5 ng/mL   Transferrin    Collection Time: 01/26/23 12:59 PM   Result Value Ref Range    Transferrin 289 202 - 364 mg/dL   TYPE AND SCREEN    Collection Time: 01/26/23  1:32 PM   Result Value Ref Range    Crossmatch expiration date 01/29/2023,2359     ABO/Rh B NEGATIVE     Antibody Screen POS     Antibody Ident ANTI-C  ANTI-D       Antigen Typing,(RBC) C NEGATIVE,     Weak D NEG     Unit Number [id number]     Product Code Blood Bank  LR,1     Unit Divison 00     Dispense Status Blood Bank ALLOCATED     Crossmatch Result Compatible     Antigen/Antibody C NEGATIVE,     Unit Number [id number]     Product Code Blood Bank  LR     Unit Divison 00     Dispense Status Blood Bank TRANSFUSED     Crossmatch Result Compatible     Antigen/Antibody C NEGATIVE,    Urinalysis w rflx microscopic    Collection Time: 01/26/23  3:46 PM   Result Value Ref Range    Color, UA YELLOW/STRAW      Appearance CLEAR      Specific Gravity, UA 1.014      pH, Urine 7.5      Protein, UA Negative mg/dL    Glucose,  mg/dL    Ketones, Urine 15 mg/dL    Bilirubin Urine Negative      Blood, Urine Negative      Urobilinogen, Urine 0.2 EU/dL    Nitrite, Urine Negative      Leukocyte Esterase, Urine Negative     Hemoglobin and Hematocrit    Collection Time: 01/27/23 12:53 AM   Result Value Ref Range    Hemoglobin 7.5 (L) 11.7 - 15.4 g/dL    Hematocrit 24.4 (L) 35.8 - 46.3 %   Protime-INR    Collection Time: 01/27/23  5:23 AM   Result Value Ref Range    Protime 14.2 12.6 - 14.3 sec    INR 1.1     APTT    Collection Time: 01/27/23  5:23 AM   Result Value Ref Range    PTT 22.1 (L) 24.5 - 34.2 SEC   Basic Metabolic Panel w/ Reflex to MG    Collection Time: 01/27/23  5:23 AM   Result Value Ref Range Sodium 138 133 - 143 mmol/L    Potassium 3.9 3.5 - 5.1 mmol/L    Chloride 109 101 - 110 mmol/L    CO2 26 21 - 32 mmol/L    Anion Gap 3 2 - 11 mmol/L    Glucose 147 (H) 65 - 100 mg/dL    BUN 13 8 - 23 MG/DL    Creatinine 0.61 0.6 - 1.0 MG/DL    Est, Glom Filt Rate >60 >60 ml/min/1.73m2    Calcium 8.3 8.3 - 10.4 MG/DL   Hemoglobin and Hematocrit    Collection Time: 01/27/23  5:23 AM   Result Value Ref Range    Hemoglobin 8.0 (L) 11.7 - 15.4 g/dL    Hematocrit 25.8 (L) 35.8 - 46.3 %       I have personally reviewed imaging studies:  Other Studies:  CT HEAD WO CONTRAST   Final Result      1. No acute intracranial abnormality. 2. Chronic small vessel ischemic changes. 3. Chronic maxillary sinus disease. XR CHEST (2 VW)   Final Result   No acute airspace disease.              Current Meds:  Current Facility-Administered Medications   Medication Dose Route Frequency    0.9 % sodium chloride infusion   IntraVENous PRN    sodium chloride flush 0.9 % injection 5-40 mL  5-40 mL IntraVENous 2 times per day    sodium chloride flush 0.9 % injection 5-40 mL  5-40 mL IntraVENous PRN    0.9 % sodium chloride infusion   IntraVENous PRN    ondansetron (ZOFRAN-ODT) disintegrating tablet 4 mg  4 mg Oral Q8H PRN    Or    ondansetron (ZOFRAN) injection 4 mg  4 mg IntraVENous Q6H PRN    acetaminophen (TYLENOL) tablet 650 mg  650 mg Oral Q6H PRN    Or    acetaminophen (TYLENOL) suppository 650 mg  650 mg Rectal Q6H PRN    pantoprazole (PROTONIX) 40 mg in sodium chloride (PF) 0.9 % 10 mL injection  40 mg IntraVENous Q12H    0.9 % sodium chloride bolus  100 mL IntraVENous ONCE PRN    iopamidol (ISOVUE-370) 76 % injection 50 mL  50 mL IntraVENous ONCE PRN    sodium chloride flush 0.9 % injection 10 mL  10 mL IntraVENous ONCE PRN       Signed:  ELVIRA Ying

## 2023-01-27 NOTE — PROGRESS NOTES
[facility] Progress Note         Admit Date:  1/26/2023    Today's Date:  1/27/2023    CC:  AGNES    Subjective:     Patient resting taliking with family, per nursing staff 1 bloody bowel movement last night    Medications:   Current Facility-Administered Medications   Medication Dose Route Frequency    tuberculin injection 5 Units  5 Units IntraDERmal Once    0.9 % sodium chloride infusion   IntraVENous PRN    sodium chloride flush 0.9 % injection 5-40 mL  5-40 mL IntraVENous 2 times per day    sodium chloride flush 0.9 % injection 5-40 mL  5-40 mL IntraVENous PRN    0.9 % sodium chloride infusion   IntraVENous PRN    ondansetron (ZOFRAN-ODT) disintegrating tablet 4 mg  4 mg Oral Q8H PRN    Or    ondansetron (ZOFRAN) injection 4 mg  4 mg IntraVENous Q6H PRN    acetaminophen (TYLENOL) tablet 650 mg  650 mg Oral Q6H PRN    Or    acetaminophen (TYLENOL) suppository 650 mg  650 mg Rectal Q6H PRN    pantoprazole (PROTONIX) 40 mg in sodium chloride (PF) 0.9 % 10 mL injection  40 mg IntraVENous Q12H    0.9 % sodium chloride bolus  100 mL IntraVENous ONCE PRN    iopamidol (ISOVUE-370) 76 % injection 50 mL  50 mL IntraVENous ONCE PRN    sodium chloride flush 0.9 % injection 10 mL  10 mL IntraVENous ONCE PRN       Review of Systems:  ROS was obtained, with pertinent positives as listed above. No chest pain or SOB. Diet:      Objective:   Vitals:  /69   Pulse 75   Temp 98.1 °F (36.7 °C) (Oral)   Resp 16   Ht 5' 1\" (1.549 m)   Wt 121 lb (54.9 kg)   SpO2 99%   BMI 22.86 kg/m²   Intake/Output:  No intake/output data recorded. 01/25 1901 - 01/27 0700  In: 387.9   Out: 200 [Urine:200]  Exam:  General appearance: alert, cooperative, no distress  Lungs: clear to auscultation bilaterally anteriorly  Heart: regular rate and rhythm  Abdomen: soft, non-tender.  Bowel sounds normal. No masses, no organomegaly  Extremities: extremities normal, atraumatic, no cyanosis or edema  Neuro:  alert and oriented    Data Review (Labs):    Recent Labs     01/26/23  1132 01/27/23  0053 01/27/23  0523   WBC 6.4  --   --    HGB 5.5* 7.5* 8.0*   HCT 18.1* 24.4* 25.8*     --   --    MCV 78.7*  --   --      --  138   K 3.8  --  3.9     --  109   CO2 26  --  26   BUN 24*  --  13   CREATININE 0.71  --  0.61   CALCIUM 8.8  --  8.3   GLUCOSE 163*  --  147*   ALKPHOS 60  --   --    AST 13*  --   --    ALT 18  --   --    BILITOT 0.3  --   --    ALBUMIN 1.2  --   --    PROT 6.2*  --   --    INR  --   --  1.1   APTT  --   --  22.1*       Assessment:     Principal Problem:    GIB (gastrointestinal bleeding)  Active Problems:    Anemia    Macular degeneration    Glaucoma    Hypertension    Dysphagia    Visual impairment  Resolved Problems:    Visual changes  Patient is a 80 y.o. WF who is seen in consultation at the request of ED staff for anemia, (Hg 5.5 MCV 78). She is blind and cannot see the color of stool to assess for blood. She is treated for GERD with protonix by Abraham Berkowitz but hasn't had EGD. Last colonoscopy was with Dr. Shannon Mena about 10 years ago. She knows of no hx of colon polyps. No recent diarrhea, abd pain, change in BH's or worsening reflux. No dysphagia, but she saw Carmen Pardo in 2014 for dysphagia and Ba swallow suggested dysmotility. An EGD was suggested but never done. She is on PLAVIX AND ASA, LAST DOSE 1/25/23  . She has had SOB and RUSSELL but cannot say for how long. No CP. Hgb stable at 8.0    Plan:     Monitor hgb and for active signs of bleeding   Continue PPI   Plan to to EGD and colonoscopy while inpatient as patient is 89,lives alone, and legally blind. Awaiting Plavix washout with anticipated procedure date Monday 1/30/23. Orders placed and OR scheduling notified and add on requested. Clear liquid diet. Patient is seen and examined in collaboration with Dr. Mercedez Kenyon. Assessment and plan as per Dr. Mercedez Kenyon.    ATTENDING NOTE:  I have seen and examined the patient along with my NP/PA. The assessment and plan above is my own. Did have bloody BM - no bump in BUN suggests LGI bleed. Holding plavix   Plan prep over weekend for EGD colo Monday.   Mackenzie Velazquez MD

## 2023-01-28 PROBLEM — K92.2 GI BLEED: Status: ACTIVE | Noted: 2023-01-28

## 2023-01-28 LAB
ANION GAP SERPL CALC-SCNC: 5 MMOL/L (ref 2–11)
BUN SERPL-MCNC: 9 MG/DL (ref 8–23)
CALCIUM SERPL-MCNC: 8.5 MG/DL (ref 8.3–10.4)
CHLORIDE SERPL-SCNC: 108 MMOL/L (ref 101–110)
CO2 SERPL-SCNC: 25 MMOL/L (ref 21–32)
CREAT SERPL-MCNC: 0.61 MG/DL (ref 0.6–1)
GLUCOSE SERPL-MCNC: 167 MG/DL (ref 65–100)
HCT VFR BLD AUTO: 26 % (ref 35.8–46.3)
HGB BLD-MCNC: 7.9 G/DL (ref 11.7–15.4)
MM INDURATION, POC: 0 MM (ref 0–5)
POTASSIUM SERPL-SCNC: 4 MMOL/L (ref 3.5–5.1)
PPD, POC: NEGATIVE
SODIUM SERPL-SCNC: 138 MMOL/L (ref 133–143)
TSH W FREE THYROID IF ABNORMAL: 1.97 UIU/ML (ref 0.36–3.74)

## 2023-01-28 PROCEDURE — 6360000002 HC RX W HCPCS: Performed by: NURSE PRACTITIONER

## 2023-01-28 PROCEDURE — 85018 HEMOGLOBIN: CPT

## 2023-01-28 PROCEDURE — 84443 ASSAY THYROID STIM HORMONE: CPT

## 2023-01-28 PROCEDURE — 96365 THER/PROPH/DIAG IV INF INIT: CPT

## 2023-01-28 PROCEDURE — 36415 COLL VENOUS BLD VENIPUNCTURE: CPT

## 2023-01-28 PROCEDURE — G0378 HOSPITAL OBSERVATION PER HR: HCPCS

## 2023-01-28 PROCEDURE — 2580000003 HC RX 258: Performed by: HOSPITALIST

## 2023-01-28 PROCEDURE — 1100000000 HC RM PRIVATE

## 2023-01-28 PROCEDURE — 80048 BASIC METABOLIC PNL TOTAL CA: CPT

## 2023-01-28 PROCEDURE — 6360000002 HC RX W HCPCS: Performed by: HOSPITALIST

## 2023-01-28 PROCEDURE — A4216 STERILE WATER/SALINE, 10 ML: HCPCS | Performed by: NURSE PRACTITIONER

## 2023-01-28 PROCEDURE — 2580000003 HC RX 258: Performed by: NURSE PRACTITIONER

## 2023-01-28 PROCEDURE — 96376 TX/PRO/DX INJ SAME DRUG ADON: CPT

## 2023-01-28 PROCEDURE — C9113 INJ PANTOPRAZOLE SODIUM, VIA: HCPCS | Performed by: NURSE PRACTITIONER

## 2023-01-28 PROCEDURE — 6370000000 HC RX 637 (ALT 250 FOR IP): Performed by: HOSPITALIST

## 2023-01-28 PROCEDURE — 97530 THERAPEUTIC ACTIVITIES: CPT

## 2023-01-28 RX ORDER — CYANOCOBALAMIN 1000 UG/ML
1000 INJECTION, SOLUTION INTRAMUSCULAR; SUBCUTANEOUS ONCE
Status: COMPLETED | OUTPATIENT
Start: 2023-01-28 | End: 2023-01-28

## 2023-01-28 RX ORDER — LANOLIN ALCOHOL/MO/W.PET/CERES
1000 CREAM (GRAM) TOPICAL DAILY
Status: DISCONTINUED | OUTPATIENT
Start: 2023-01-29 | End: 2023-01-29

## 2023-01-28 RX ORDER — DOXYCYCLINE HYCLATE 50 MG/1
324 CAPSULE, GELATIN COATED ORAL
Status: DISCONTINUED | OUTPATIENT
Start: 2023-01-29 | End: 2023-01-29

## 2023-01-28 RX ORDER — CLONIDINE HYDROCHLORIDE 0.1 MG/1
0.2 TABLET ORAL 2 TIMES DAILY PRN
Status: DISCONTINUED | OUTPATIENT
Start: 2023-01-28 | End: 2023-02-01 | Stop reason: HOSPADM

## 2023-01-28 RX ORDER — AMLODIPINE BESYLATE 5 MG/1
5 TABLET ORAL DAILY
Status: DISCONTINUED | OUTPATIENT
Start: 2023-01-28 | End: 2023-02-01

## 2023-01-28 RX ADMIN — SODIUM CHLORIDE 975 MG: 9 INJECTION, SOLUTION INTRAVENOUS at 13:37

## 2023-01-28 RX ADMIN — AMLODIPINE BESYLATE 5 MG: 5 TABLET ORAL at 10:03

## 2023-01-28 RX ADMIN — SODIUM CHLORIDE, PRESERVATIVE FREE 10 ML: 5 INJECTION INTRAVENOUS at 00:27

## 2023-01-28 RX ADMIN — SODIUM CHLORIDE, PRESERVATIVE FREE 40 MG: 5 INJECTION INTRAVENOUS at 00:22

## 2023-01-28 RX ADMIN — SODIUM CHLORIDE 25 MG: 900 INJECTION INTRAVENOUS at 12:25

## 2023-01-28 RX ADMIN — SODIUM CHLORIDE, PRESERVATIVE FREE 40 MG: 5 INJECTION INTRAVENOUS at 12:29

## 2023-01-28 RX ADMIN — CYANOCOBALAMIN 1000 MCG: 1000 INJECTION, SOLUTION INTRAMUSCULAR; SUBCUTANEOUS at 22:59

## 2023-01-28 RX ADMIN — SODIUM CHLORIDE, PRESERVATIVE FREE 10 ML: 5 INJECTION INTRAVENOUS at 20:40

## 2023-01-28 ASSESSMENT — PAIN SCALES - GENERAL: PAINLEVEL_OUTOF10: 3

## 2023-01-28 NOTE — PROGRESS NOTES
It is with  great PREM we pray for your family today: \"Trust in the LORD with all your heart     and lean not on your own  understanding; In all your ways submit to HIM,     and HE will make your paths straight. \"          May God's favor and peace be with you this day.             Bk Jaquez

## 2023-01-28 NOTE — PROGRESS NOTES
Per notes:         Pt son stated concerns over his mother's mentation. The past couple of days she has been responsive, coherent, and knows where she is and what she is doing. He stated she seems as if she can't even  her fork to feed herself and lacking energy. He also has concerns because of her sudden \"decline\" that she may not be ok to have her colon prep and procedure tomorrow. He and his wife are leaving [facility] to go back to [place] and they are just very concerned for her.

## 2023-01-28 NOTE — PROGRESS NOTES
ACUTE PHYSICAL THERAPY GOALS:   (Developed with and agreed upon by patient and/or caregiver.)  STG:  (1.)Ms. Andrade Jackson will move from supine to sit and sit to supine  with INDEPENDENCE within 4-7 treatment day(s). (2.)Ms. Andrade Jackson will transfer from bed to chair and chair to bed with SUPERVISION using the least restrictive device within 4-7 treatment day(s). (3.)Ms. Andrade Jackson will ambulate with CONTACT GUARD ASSIST/SBA for 800 feet with the least restrictive device within 4-7 treatment day(s). (4.)Ms. Andrade Jackson will participate in standing leg exercises to assist with her mobility, balance, and activity tolerance within 4-7 treatment days. ________________________________________________________________________________________________     PHYSICAL THERAPY Daily Note, Discharge, and AM  (Link to Caseload Tracking: PT Visit Days : 2  Acknowledge Orders  Time In/Out  PT Charge Capture  Rehab Caseload Tracker    Marlene Saucedo is a 80 y.o. female   PRIMARY DIAGNOSIS: GIB (gastrointestinal bleeding)  GIB (gastrointestinal bleeding) [K92.2]  Transient vision disturbance [H53.9]  Gastrointestinal hemorrhage, unspecified gastrointestinal hemorrhage type [K92.2]  Anemia, unspecified type [D64.9]  Procedure(s) (LRB):  EGD ESOPHAGOGASTRODUODENOSCOPY (N/A)  COLONOSCOPY DIAGNOSTIC (N/A)     Reason for Referral: Generalized Muscle Weakness (M62.81)  Difficulty in walking, Not elsewhere classified (R26.2)  Observation: Payor: MEDICARE / Plan: MEDICARE PART A AND B / Product Type: *No Product type* /     ASSESSMENT:     REHAB RECOMMENDATIONS:   Recommendation to date pending progress:  Setting:  Don't anticipate any d/c therapy needs. She can return to exercise program at her [employer]. Equipment:    None     ASSESSMENT:  Ms. Andrade Jackson participated well today. Easily agreeable. Patient independent with bed mobility and transfers. She ambulated in the room and outside the room with CGA only due to unfamiliar environment.   No balance concerns while ambulating around the 3rd floor. Ambulated up/down a ramp, again with CGA due to environment. Patient is moving well and is able to d/c back to her familiar home environment when medically cleared. She exercises with other ladies at her [facility] 3 times/week and is encouraged to resume when d/c'd from the hospital.  She does not have any additional acute therapy needs and will be discharged at this time. [address] [facility] [address] [id number] AM-PAC 6 Clicks Basic Mobility Inpatient Short Form  AM-PAC Mobility Inpatient   How much difficulty turning over in bed?: None  How much difficulty sitting down on / standing up from a chair with arms?: None  How much difficulty moving from lying on back to sitting on side of bed?: A Little  How much help from another person moving to and from a bed to a chair?: A Little  How much help from another person needed to walk in hospital room?: A Little  How much help from another person for climbing 3-5 steps with a railing?: A Little  [facility] Inpatient Mobility Raw Score : 20  AM-PAC Inpatient T-Scale Score : 47.67  Mobility Inpatient CMS 0-100% Score: 35.83  Mobility Inpatient CMS G-Code Modifier : CJ    SUBJECTIVE:   Ms. Jolynn Fung states, \"Let's go for a walk\".     Social/Functional Lives With: Alone  Type of Home: House  Home Layout: One level  Home Access: Ramped entrance  Bathroom Shower/Tub: Tub/Shower unit    OBJECTIVE:     PAIN: VITALS / O2: PRECAUTION / Evalene Schillings / DRAINS:   Pre Treatment:   Pain Assessment: 0-10  Pain Level: 3  Pain Location:  (heels)      Post Treatment: 3/10 Vitals        Oxygen  O2 Device: None (Room air)   Telemetry     RESTRICTIONS/PRECAUTIONS:                    GROSS EVALUATION: Intact Impaired (Comments):   AROM [x]     PROM []    Strength []  Generally decreased, functional   Balance [] Sitting - Static: Good  Sitting - Dynamic: Good  Standing - Static: Good  Standing - Dynamic: Fair, +   Posture [] Forward Head  Rounded Shoulders   Sensation [x]     Coordination [x]      Tone [x]     Edema []    Activity Tolerance [x]      []      COGNITION/  PERCEPTION: Intact Impaired (Comments):   Orientation [x]     Vision []    Glaucoma and macular degeneration   Hearing [x]     Cognition  [x]       MOBILITY: I Mod I S SBA CGA Min Mod Max Total  NT x2 Comments:   Bed Mobility    Rolling [] [] [] [] [] [] [] [] [] [] []    Supine to Sit [x] [] [] [] [] [] [] [] [] [] []    Scooting [] [] [] [] [] [] [] [] [] [] []    Sit to Supine [] [] [] [] [] [] [] [] [] [] []    Transfers    Sit to Stand [x] [] [] [] [] [] [] [] [] [] []    Bed to Chair [] [] [] [x] [] [] [] [] [] [] []    Stand to Sit [x] [] [] [] [] [] [] [] [] [] []     [] [] [] [] [] [] [] [] [] [] []    I=Independent, Mod I=Modified Independent, S=Supervision, SBA=Standby Assistance, CGA=Contact Guard Assistance,   Min=Minimal Assistance, Mod=Moderate Assistance, Max=Maximal Assistance, Total=Total Assistance, NT=Not Tested    GAIT: I Mod I S SBA CGA Min Mod Max Total  NT x2 Comments:   Level of Assistance [] [] [] [] [x] [] [] [] [] [] [] CGA for direction due to vision loss and unfamiliar environment   Distance 700 feet    DME None    Gait Quality Decreased raphael  and Decreased step length    Weightbearing Status      Stairs Up/down ramp with CGA     I=Independent, Mod I=Modified Independent, S=Supervision, SBA=Standby Assistance, CGA=Contact Guard Assistance,   Min=Minimal Assistance, Mod=Moderate Assistance, Max=Maximal Assistance, Total=Total Assistance, NT=Not Tested    PLAN:   FREQUENCY AND DURATION: d/c acute therapy    THERAPY PROGNOSIS: Good    PROBLEM LIST:   (Skilled intervention is medically necessary to address:)  Decreased ADL/Functional Activities  Decreased Activity Tolerance  Decreased Gait Ability  Decreased Transfer Abilities INTERVENTIONS PLANNED:   (Benefits and precautions of physical therapy have been discussed with the patient.)  Therapeutic Activity  Therapeutic Exercise/HEP  Gait Training  Education       TREATMENT:       TREATMENT:   Therapeutic Activity (24 Minutes): Therapeutic activity included Supine to Sit, Transfer Training, Ambulation on level ground, Sitting balance , Standing balance, and ramp training to improve functional Mobility.     TREATMENT GRID:  N/A    AFTER TREATMENT PRECAUTIONS: Bed/Chair Locked, Call light within reach, Chair, Heels floated, and Visitors at bedside    INTERDISCIPLINARY COLLABORATION:  RN/ PCT    EDUCATION: Education Given To: Patient  Education Provided: Role of Therapy;Plan of Care  Education Method: Verbal  Education Outcome: Verbalized understanding    TIME IN/OUT:  Time In: 1046  Time Out: [address] [facility]  Minutes: Emelyn Villanueva 6, PT

## 2023-01-28 NOTE — PROGRESS NOTES
Hospitalist Progress Note   Admit Date:  2023  9:13 AM   Name:  Gabby Crandall   Age:  80 y.o. Sex:  female  :  1932   MRN:  [id number]   Room:  [facility]        Hospital Course:     80 y.o. female with medical history of macular degeneration causing legal blindness, HTN, GERD, dysphagia who presented with concerns of decrease visual changes however found to have Hg of 5.5 and a GIB. She has since had PRBC infusions with some continued janae blood in her stool. Today, no BM since admission, no ac events, on liquids    Assessment & Plan:       GIB (gastrointestinal bleeding), lower per hx, on ASA and plavix at home  Anemia  - plan for EGD and colonoscopy on Monday as she needs Plavix washout  - continue cl liq diet, PPI, monitor stools  - transfuse if Hg <7, Hg q8h  Received PRBC x1  Iron and B12 deficient, replaced injections and PO    Follow TSH. Some confusion reported by family      Macular degeneration    Glaucoma    Visual impairment  - at baseline; have asked nurses to inspect stools as pt is unable  - continue home meds      Hypertension, at baseline      Dysphagia  - as above      Anticipated discharge needs:    - PT/OT has seen. Her son and daughter in law are in town from [place] [id number].  She does not require any home services at this time    Dispo: home  Case d/w pt and family    Objective:   Patient Vitals for the past 24 hrs:   Temp Pulse Resp BP SpO2   23 1147 98.2 °F (36.8 °C) 87 16 (!) 143/48 99 %   23 0802 98.4 °F (36.9 °C) 85 13 (!) 149/58 98 %   23 0331 98.6 °F (37 °C) 60 16 (!) 160/76 100 %   23 2349 98.2 °F (36.8 °C) 79 16 (!) 116/54 96 %   23 192 98.5 °F (36.9 °C) 76 16 (!) 151/45 98 %   23 1920 -- 80 -- -- --   23 1615 98.6 °F (37 °C) 85 12 (!) 140/52 100 %         Oxygen Therapy  SpO2: 99 %  O2 Device: None (Room air)    Estimated body mass index is 22.86 kg/m² as calculated from the following:    Height as of this encounter: 5' 1\" (1.549 m).    Weight as of this encounter: 121 lb (54.9 kg). Intake/Output Summary (Last 24 hours) at 1/28/2023 1512  Last data filed at 1/28/2023 1306  Gross per 24 hour   Intake --   Output 1850 ml   Net -1850 ml           Physical Exam:   Blood pressure (!) 143/48, pulse 87, temperature 98.2 °F (36.8 °C), temperature source Oral, resp. rate 16, height 5' 1\" (1.549 m), weight 121 lb (54.9 kg), SpO2 99 %. General:    Well nourished. Pale, mild distress  CV:   RRR. No m/r/g. No jugular venous distension. Lungs:   CTAB. No wheezing, rhonchi, or rales. Symmetric expansion. Abdomen:   Soft, nontender, nondistended. Extremities: No cyanosis or clubbing. No edema  Skin:     No rashes and normal coloration. Warm and dry. Neuro:  grossly intact. Psych:  Normal mood and affect.       I have personally reviewed labs and tests:  Recent Labs:  Recent Results (from the past 48 hour(s))   Urinalysis w rflx microscopic    Collection Time: 01/26/23  3:46 PM   Result Value Ref Range    Color, UA YELLOW/STRAW      Appearance CLEAR      Specific Gravity, UA 1.014      pH, Urine 7.5      Protein, UA Negative mg/dL    Glucose,  mg/dL    Ketones, Urine 15 mg/dL    Bilirubin Urine Negative      Blood, Urine Negative      Urobilinogen, Urine 0.2 EU/dL    Nitrite, Urine Negative      Leukocyte Esterase, Urine Negative     Hemoglobin and Hematocrit    Collection Time: 01/27/23 12:53 AM   Result Value Ref Range    Hemoglobin 7.5 (L) 11.7 - 15.4 g/dL    Hematocrit 24.4 (L) 35.8 - 46.3 %   Protime-INR    Collection Time: 01/27/23  5:23 AM   Result Value Ref Range    Protime 14.2 12.6 - 14.3 sec    INR 1.1     APTT    Collection Time: 01/27/23  5:23 AM   Result Value Ref Range    PTT 22.1 (L) 24.5 - 34.2 SEC   Basic Metabolic Panel w/ Reflex to MG    Collection Time: 01/27/23  5:23 AM   Result Value Ref Range    Sodium 138 133 - 143 mmol/L    Potassium 3.9 3.5 - 5.1 mmol/L    Chloride 109 101 - 110 mmol/L    CO2 26 21 - 32 mmol/L    Anion Gap 3 2 - 11 mmol/L    Glucose 147 (H) 65 - 100 mg/dL    BUN 13 8 - 23 MG/DL    Creatinine 0.61 0.6 - 1.0 MG/DL    Est, Glom Filt Rate >60 >60 ml/min/1.73m2    Calcium 8.3 8.3 - 10.4 MG/DL   Hemoglobin and Hematocrit    Collection Time: 01/27/23  5:23 AM   Result Value Ref Range    Hemoglobin 8.0 (L) 11.7 - 15.4 g/dL    Hematocrit 25.8 (L) 35.8 - 46.3 %   Hemoglobin and Hematocrit    Collection Time: 01/27/23 12:47 PM   Result Value Ref Range    Hemoglobin 7.6 (L) 11.7 - 15.4 g/dL    Hematocrit 25.4 (L) 35.8 - 46.3 %   Hemoglobin and Hematocrit    Collection Time: 01/27/23  8:53 PM   Result Value Ref Range    Hemoglobin 7.4 (L) 11.7 - 15.4 g/dL    Hematocrit 24.3 (L) 35.8 - 46.3 %   Hemoglobin and Hematocrit    Collection Time: 01/28/23  4:37 AM   Result Value Ref Range    Hemoglobin 7.9 (L) 11.7 - 15.4 g/dL    Hematocrit 26.0 (L) 35.8 - 46.3 %   Basic Metabolic Panel    Collection Time: 01/28/23  4:37 AM   Result Value Ref Range    Sodium 138 133 - 143 mmol/L    Potassium 4.0 3.5 - 5.1 mmol/L    Chloride 108 101 - 110 mmol/L    CO2 25 21 - 32 mmol/L    Anion Gap 5 2 - 11 mmol/L    Glucose 167 (H) 65 - 100 mg/dL    BUN 9 8 - 23 MG/DL    Creatinine 0.61 0.6 - 1.0 MG/DL    Est, Glom Filt Rate >60 >60 ml/min/1.73m2    Calcium 8.5 8.3 - 10.4 MG/DL   PLEASE READ & DOCUMENT PPD TEST IN 24 HRS    Collection Time: 01/28/23  1:11 PM   Result Value Ref Range    PPD, (POC) Negative Negative    mm Induration 0 0 - 5 mm       I have personally reviewed imaging studies:  Other Studies:  CT HEAD WO CONTRAST   Final Result      1. No acute intracranial abnormality. 2. Chronic small vessel ischemic changes. 3. Chronic maxillary sinus disease. XR CHEST (2 VW)   Final Result   No acute airspace disease.              Current Meds:  Current Facility-Administered Medications   Medication Dose Route Frequency    amLODIPine (NORVASC) tablet 5 mg  5 mg Oral Daily    cloNIDine (CATAPRES) tablet 0.2 mg  0.2 mg Oral BID PRN    iron dextran complex (INFED) 975 mg in sodium chloride 0.9 % 500 mL IVPB  975 mg IntraVENous Once    [START ON 1/29/2023] ferrous gluconate (FERGON) tablet 324 mg  324 mg Oral Daily with breakfast    [START ON 1/29/2023] bisacodyl (DULCOLAX) EC tablet 20 mg  20 mg Oral Once    sodium chloride flush 0.9 % injection 5-40 mL  5-40 mL IntraVENous 2 times per day    sodium chloride flush 0.9 % injection 5-40 mL  5-40 mL IntraVENous PRN    0.9 % sodium chloride infusion   IntraVENous PRN    ondansetron (ZOFRAN-ODT) disintegrating tablet 4 mg  4 mg Oral Q8H PRN    Or    ondansetron (ZOFRAN) injection 4 mg  4 mg IntraVENous Q6H PRN    acetaminophen (TYLENOL) tablet 650 mg  650 mg Oral Q6H PRN    Or    acetaminophen (TYLENOL) suppository 650 mg  650 mg Rectal Q6H PRN    pantoprazole (PROTONIX) 40 mg in sodium chloride (PF) 0.9 % 10 mL injection  40 mg IntraVENous Q12H    0.9 % sodium chloride bolus  100 mL IntraVENous ONCE PRN    iopamidol (ISOVUE-370) 76 % injection 50 mL  50 mL IntraVENous ONCE PRN    sodium chloride flush 0.9 % injection 10 mL  10 mL IntraVENous ONCE PRN       Signed:  Kam Armando MD

## 2023-01-29 ENCOUNTER — ANESTHESIA EVENT (OUTPATIENT)
Dept: ENDOSCOPY | Age: 88
DRG: 378 | End: 2023-01-29
Payer: MEDICARE

## 2023-01-29 LAB
HGB BLD-MCNC: 7.5 G/DL (ref 11.7–15.4)
MM INDURATION, POC: 0 MM (ref 0–5)
PPD, POC: NEGATIVE

## 2023-01-29 PROCEDURE — 6370000000 HC RX 637 (ALT 250 FOR IP): Performed by: NURSE PRACTITIONER

## 2023-01-29 PROCEDURE — A4216 STERILE WATER/SALINE, 10 ML: HCPCS | Performed by: NURSE PRACTITIONER

## 2023-01-29 PROCEDURE — 1100000000 HC RM PRIVATE

## 2023-01-29 PROCEDURE — 2580000003 HC RX 258: Performed by: NURSE PRACTITIONER

## 2023-01-29 PROCEDURE — C9113 INJ PANTOPRAZOLE SODIUM, VIA: HCPCS | Performed by: NURSE PRACTITIONER

## 2023-01-29 PROCEDURE — 36415 COLL VENOUS BLD VENIPUNCTURE: CPT

## 2023-01-29 PROCEDURE — 6370000000 HC RX 637 (ALT 250 FOR IP): Performed by: INTERNAL MEDICINE

## 2023-01-29 PROCEDURE — 6370000000 HC RX 637 (ALT 250 FOR IP): Performed by: HOSPITALIST

## 2023-01-29 PROCEDURE — 6360000002 HC RX W HCPCS: Performed by: NURSE PRACTITIONER

## 2023-01-29 PROCEDURE — 85018 HEMOGLOBIN: CPT

## 2023-01-29 RX ORDER — SODIUM CHLORIDE 9 MG/ML
INJECTION, SOLUTION INTRAVENOUS PRN
Status: CANCELLED | OUTPATIENT
Start: 2023-01-29

## 2023-01-29 RX ORDER — SODIUM CHLORIDE 0.9 % (FLUSH) 0.9 %
5-40 SYRINGE (ML) INJECTION PRN
Status: CANCELLED | OUTPATIENT
Start: 2023-01-29

## 2023-01-29 RX ORDER — LIDOCAINE HYDROCHLORIDE 10 MG/ML
1 INJECTION, SOLUTION INFILTRATION; PERINEURAL
Status: CANCELLED | OUTPATIENT
Start: 2023-01-29 | End: 2023-01-30

## 2023-01-29 RX ORDER — CYANOCOBALAMIN 1000 UG/ML
1000 INJECTION, SOLUTION INTRAMUSCULAR; SUBCUTANEOUS DAILY
Status: COMPLETED | OUTPATIENT
Start: 2023-01-30 | End: 2023-01-31

## 2023-01-29 RX ORDER — SODIUM CHLORIDE 0.9 % (FLUSH) 0.9 %
5-40 SYRINGE (ML) INJECTION EVERY 12 HOURS SCHEDULED
Status: CANCELLED | OUTPATIENT
Start: 2023-01-29

## 2023-01-29 RX ORDER — POLYETHYLENE GLYCOL 3350 17 G/17G
238 POWDER, FOR SOLUTION ORAL ONCE
Status: COMPLETED | OUTPATIENT
Start: 2023-01-29 | End: 2023-01-29

## 2023-01-29 RX ORDER — ONDANSETRON 2 MG/ML
4 INJECTION INTRAMUSCULAR; INTRAVENOUS
Status: CANCELLED | OUTPATIENT
Start: 2023-01-29 | End: 2023-01-30

## 2023-01-29 RX ADMIN — AMLODIPINE BESYLATE 5 MG: 5 TABLET ORAL at 09:03

## 2023-01-29 RX ADMIN — POLYETHYLENE GLYCOL 3350 238 G: 17 POWDER, FOR SOLUTION ORAL at 17:32

## 2023-01-29 RX ADMIN — SODIUM CHLORIDE, PRESERVATIVE FREE 10 ML: 5 INJECTION INTRAVENOUS at 21:26

## 2023-01-29 RX ADMIN — SODIUM CHLORIDE, PRESERVATIVE FREE 10 ML: 5 INJECTION INTRAVENOUS at 09:12

## 2023-01-29 RX ADMIN — SODIUM CHLORIDE, PRESERVATIVE FREE 40 MG: 5 INJECTION INTRAVENOUS at 00:20

## 2023-01-29 RX ADMIN — BISACODYL 20 MG: 5 TABLET, COATED ORAL at 16:32

## 2023-01-29 RX ADMIN — SODIUM CHLORIDE, PRESERVATIVE FREE 40 MG: 5 INJECTION INTRAVENOUS at 13:06

## 2023-01-29 RX ADMIN — Medication 1000 MCG: at 09:03

## 2023-01-29 RX ADMIN — SODIUM CHLORIDE, PRESERVATIVE FREE 10 ML: 5 INJECTION INTRAVENOUS at 00:23

## 2023-01-29 NOTE — PROGRESS NOTES
Patient signed her consent for EGD/Colonoscopy in the presence of patient's son and daughter-in-law, witnessed by this nurse.

## 2023-01-29 NOTE — PROGRESS NOTES
[facility] Progress Note         Admit Date:  1/26/2023    Today's Date:  1/29/2023    CC:  AGNES    Subjective:     No BM's yesterday or today. Hg stable 7/5    Medications:   Current Facility-Administered Medications   Medication Dose Route Frequency    amLODIPine (NORVASC) tablet 5 mg  5 mg Oral Daily    cloNIDine (CATAPRES) tablet 0.2 mg  0.2 mg Oral BID PRN    ferrous gluconate (FERGON) tablet 324 mg  324 mg Oral Daily with breakfast    vitamin B-12 (CYANOCOBALAMIN) tablet 1,000 mcg  1,000 mcg Oral Daily    bisacodyl (DULCOLAX) EC tablet 20 mg  20 mg Oral Once    sodium chloride flush 0.9 % injection 5-40 mL  5-40 mL IntraVENous 2 times per day    sodium chloride flush 0.9 % injection 5-40 mL  5-40 mL IntraVENous PRN    0.9 % sodium chloride infusion   IntraVENous PRN    ondansetron (ZOFRAN-ODT) disintegrating tablet 4 mg  4 mg Oral Q8H PRN    Or    ondansetron (ZOFRAN) injection 4 mg  4 mg IntraVENous Q6H PRN    acetaminophen (TYLENOL) tablet 650 mg  650 mg Oral Q6H PRN    Or    acetaminophen (TYLENOL) suppository 650 mg  650 mg Rectal Q6H PRN    pantoprazole (PROTONIX) 40 mg in sodium chloride (PF) 0.9 % 10 mL injection  40 mg IntraVENous Q12H    0.9 % sodium chloride bolus  100 mL IntraVENous ONCE PRN    iopamidol (ISOVUE-370) 76 % injection 50 mL  50 mL IntraVENous ONCE PRN    sodium chloride flush 0.9 % injection 10 mL  10 mL IntraVENous ONCE PRN       Review of Systems:  ROS was obtained, with pertinent positives as listed above. No chest pain or SOB.     Diet:      Objective:   Vitals:  BP (!) 137/50   Pulse 76   Temp 98.1 °F (36.7 °C) (Oral)   Resp 16   Ht 5' 1\" (1.549 m)   Wt 121 lb (54.9 kg)   SpO2 99%   BMI 22.86 kg/m²   Intake/Output:  01/29 0701 - 01/29 1900  In: -   Out: 600 [Urine:600]  01/27 1901 - 01/29 0700  In: -   Out: 1297 [Urine:2350]  Exam:  General appearance: alert, cooperative, no distress  Lungs: clear to auscultation bilaterally anteriorly  Heart: regular rate and rhythm  Abdomen: soft, non-tender. Bowel sounds normal. No masses, no organomegaly  Extremities: extremities normal, atraumatic, no cyanosis or edema  Neuro:  alert and oriented    Data Review (Labs):    Recent Labs     01/26/23  1132 01/27/23  0053 01/27/23  0523 01/27/23  1247 01/27/23 2053 01/28/23 0437 01/29/23  0435   WBC 6.4  --   --   --   --   --   --    HGB 5.5* 7.5* 8.0* 7.6* 7.4* 7.9* 7.5*   HCT 18.1* 24.4* 25.8* 25.4* 24.3* 26.0*  --      --   --   --   --   --   --    MCV 78.7*  --   --   --   --   --   --      --  138  --   --  138  --    K 3.8  --  3.9  --   --  4.0  --      --  109  --   --  108  --    CO2 26  --  26  --   --  25  --    BUN 24*  --  13  --   --  9  --    CREATININE 0.71  --  0.61  --   --  0.61  --    CALCIUM 8.8  --  8.3  --   --  8.5  --    GLUCOSE 163*  --  147*  --   --  167*  --    ALKPHOS 60  --   --   --   --   --   --    AST 13*  --   --   --   --   --   --    ALT 18  --   --   --   --   --   --    BILITOT 0.3  --   --   --   --   --   --    ALBUMIN 1.2  --   --   --   --   --   --    PROT 6.2*  --   --   --   --   --   --    INR  --   --  1.1  --   --   --   --    APTT  --   --  22.1*  --   --   --   --          Assessment:     Principal Problem:    GIB (gastrointestinal bleeding)  Active Problems:    Anemia    Macular degeneration    Glaucoma    GI bleed    Hypertension    Dysphagia    Visual impairment  Resolved Problems:    Visual changes  Patient is a 80 y.o. WF who is seen in consultation at the request of ED staff for anemia, (Hg 5.5 MCV 78). She is blind and cannot see the color of stool to assess for blood. She is treated for GERD with protonix by Dwaine Roberts but hasn't had EGD. Last colonoscopy was with Dr. Julia Mac about 10 years ago. She knows of no hx of colon polyps. No recent diarrhea, abd pain, change in BH's or worsening reflux. No dysphagia, but she saw Joy Edwards in 2014 for dysphagia and Ba swallow suggested dysmotility.   An EGD was suggested but never done. She is on PLAVIX AND ASA, LAST DOSE 1/25/23  . She has had SOB and RUSSELL but cannot say for how long. No CP. Hgb stable at 7.5    Plan:     Monitor hgb and for active signs of bleeding   Continue PPI   Plan to to EGD and colonoscopy while inpatient as patient is 89,lives alone, and legally blind. Awaiting Plavix washout with anticipated procedure date Monday 1/30/23. Orders placed and OR scheduling notified and add on requested. Clear liquid diet. Patient is seen and examined in collaboration with Dr. Lisset Flores. Assessment and plan as per Dr. Lisset Flores. ATTENDING NOTE:  I have seen and examined the patient along with my NP/PA. The assessment and plan above is my own. Did have bloody BM - no bump in BUN suggests LGI bleed. Holding plavix since 1/25   Prep today for colo and EGD tomorrow for anemia and LGIB'ing.   Maliha Mitchell MD

## 2023-01-29 NOTE — PROGRESS NOTES
Hospitalist Progress Note   Admit Date:  2023  9:13 AM   Name:  Mariely Humphrey   Age:  80 y.o. Sex:  female  :  1932   MRN:  [id number]   Room:  [facility]    Hospital Course:     80 y.o. female with medical history of macular degeneration causing legal blindness, HTN, GERD, dysphagia who presented with concerns of decrease visual changes however found to have Hg of 5.5 and a GIB. She has since had PRBC infusions with some continued janae blood in her stool. Today, no BM since admission, no ac events, on liquids, fully oriented    Assessment & Plan:     1- GIB (gastrointestinal bleeding), lower per hx, on ASA and plavix at home. S/p PRBC 1 unit transfusion. Irona dn B12 deficient. 2- Hx of  Macular degeneration, Glaucoma, dysphagia, HTN. Stable. Rx;  EGD and colonoscopy tomorrow  AM lab    Dispo: home    Objective:   Patient Vitals for the past 24 hrs:   Temp Pulse Resp BP SpO2   23 0746 98.1 °F (36.7 °C) 76 16 (!) 137/50 99 %   23 0326 98.6 °F (37 °C) 80 16 (!) 108/54 95 %   23 2336 99.9 °F (37.7 °C) 80 16 (!) 158/52 94 %   230 -- 80 -- -- --   23 1933 99.1 °F (37.3 °C) 79 16 (!) 129/58 97 %   23 1645 -- -- -- (!) 121/43 --   23 1644 98.4 °F (36.9 °C) 73 13 (!) 130/43 (!) 81 %   23 1147 98.2 °F (36.8 °C) 87 16 (!) 143/48 99 %         Oxygen Therapy  SpO2: 99 %  O2 Device: None (Room air)    Estimated body mass index is 22.86 kg/m² as calculated from the following:    Height as of this encounter: 5' 1\" (1.549 m). Weight as of this encounter: 121 lb (54.9 kg). Intake/Output Summary (Last 24 hours) at 2023 1026  Last data filed at 2023 1007  Gross per 24 hour   Intake 240 ml   Output 1250 ml   Net -1010 ml           Physical Exam:   Blood pressure (!) 137/50, pulse 76, temperature 98.1 °F (36.7 °C), temperature source Oral, resp. rate 16, height 5' 1\" (1.549 m), weight 121 lb (54.9 kg), SpO2 99 %. General:    Well nourished. Pale, mild distress  CV:   RRR. No m/r/g. No jugular venous distension. Lungs:   CTAB. No wheezing, rhonchi, or rales. Symmetric expansion. Abdomen:   Soft, nontender, nondistended. Extremities: No cyanosis or clubbing. No edema  Skin:     No rashes and normal coloration. Warm and dry. Neuro:  grossly intact. Psych:  Normal mood and affect. I have personally reviewed labs and tests:  Recent Labs:  Recent Results (from the past 48 hour(s))   Hemoglobin and Hematocrit    Collection Time: 01/27/23 12:47 PM   Result Value Ref Range    Hemoglobin 7.6 (L) 11.7 - 15.4 g/dL    Hematocrit 25.4 (L) 35.8 - 46.3 %   Hemoglobin and Hematocrit    Collection Time: 01/27/23  8:53 PM   Result Value Ref Range    Hemoglobin 7.4 (L) 11.7 - 15.4 g/dL    Hematocrit 24.3 (L) 35.8 - 46.3 %   Hemoglobin and Hematocrit    Collection Time: 01/28/23  4:37 AM   Result Value Ref Range    Hemoglobin 7.9 (L) 11.7 - 15.4 g/dL    Hematocrit 26.0 (L) 35.8 - 46.3 %   Basic Metabolic Panel    Collection Time: 01/28/23  4:37 AM   Result Value Ref Range    Sodium 138 133 - 143 mmol/L    Potassium 4.0 3.5 - 5.1 mmol/L    Chloride 108 101 - 110 mmol/L    CO2 25 21 - 32 mmol/L    Anion Gap 5 2 - 11 mmol/L    Glucose 167 (H) 65 - 100 mg/dL    BUN 9 8 - 23 MG/DL    Creatinine 0.61 0.6 - 1.0 MG/DL    Est, Glom Filt Rate >60 >60 ml/min/1.73m2    Calcium 8.5 8.3 - 10.4 MG/DL   TSH with Reflex    Collection Time: 01/28/23  4:37 AM   Result Value Ref Range    TSH w Free Thyroid if Abnormal 1.97 0.358 - 3.740 UIU/ML   PLEASE READ & DOCUMENT PPD TEST IN 24 HRS    Collection Time: 01/28/23  1:11 PM   Result Value Ref Range    PPD, (POC) Negative Negative    mm Induration 0 0 - 5 mm   Hemoglobin    Collection Time: 01/29/23  4:35 AM   Result Value Ref Range    Hemoglobin 7.5 (L) 11.7 - 15.4 g/dL       I have personally reviewed imaging studies:  Other Studies:  CT HEAD WO CONTRAST   Final Result      1. No acute intracranial abnormality.    2. Chronic small vessel ischemic changes. 3. Chronic maxillary sinus disease. XR CHEST (2 VW)   Final Result   No acute airspace disease.              Current Meds:  Current Facility-Administered Medications   Medication Dose Route Frequency    polyethylene glycol (GLYCOLAX) powder 238 g  238 g Oral Once    [START ON 1/30/2023] cyanocobalamin injection 1,000 mcg  1,000 mcg IntraMUSCular Daily    amLODIPine (NORVASC) tablet 5 mg  5 mg Oral Daily    cloNIDine (CATAPRES) tablet 0.2 mg  0.2 mg Oral BID PRN    bisacodyl (DULCOLAX) EC tablet 20 mg  20 mg Oral Once    sodium chloride flush 0.9 % injection 5-40 mL  5-40 mL IntraVENous 2 times per day    sodium chloride flush 0.9 % injection 5-40 mL  5-40 mL IntraVENous PRN    0.9 % sodium chloride infusion   IntraVENous PRN    ondansetron (ZOFRAN-ODT) disintegrating tablet 4 mg  4 mg Oral Q8H PRN    Or    ondansetron (ZOFRAN) injection 4 mg  4 mg IntraVENous Q6H PRN    acetaminophen (TYLENOL) tablet 650 mg  650 mg Oral Q6H PRN    Or    acetaminophen (TYLENOL) suppository 650 mg  650 mg Rectal Q6H PRN    pantoprazole (PROTONIX) 40 mg in sodium chloride (PF) 0.9 % 10 mL injection  40 mg IntraVENous Q12H    0.9 % sodium chloride bolus  100 mL IntraVENous ONCE PRN    iopamidol (ISOVUE-370) 76 % injection 50 mL  50 mL IntraVENous ONCE PRN    sodium chloride flush 0.9 % injection 10 mL  10 mL IntraVENous ONCE PRN       Signed:  Malia Soriano MD

## 2023-01-30 ENCOUNTER — ANESTHESIA (OUTPATIENT)
Dept: ENDOSCOPY | Age: 88
DRG: 378 | End: 2023-01-30
Payer: MEDICARE

## 2023-01-30 LAB
HGB BLD-MCNC: 7.8 G/DL (ref 11.7–15.4)
MM INDURATION, POC: 0 MM (ref 0–5)
PPD, POC: NEGATIVE

## 2023-01-30 PROCEDURE — 3609012400 HC EGD TRANSORAL BIOPSY SINGLE/MULTIPLE: Performed by: STUDENT IN AN ORGANIZED HEALTH CARE EDUCATION/TRAINING PROGRAM

## 2023-01-30 PROCEDURE — 3700000001 HC ADD 15 MINUTES (ANESTHESIA): Performed by: STUDENT IN AN ORGANIZED HEALTH CARE EDUCATION/TRAINING PROGRAM

## 2023-01-30 PROCEDURE — 2500000003 HC RX 250 WO HCPCS: Performed by: NURSE ANESTHETIST, CERTIFIED REGISTERED

## 2023-01-30 PROCEDURE — 6370000000 HC RX 637 (ALT 250 FOR IP): Performed by: STUDENT IN AN ORGANIZED HEALTH CARE EDUCATION/TRAINING PROGRAM

## 2023-01-30 PROCEDURE — 2580000003 HC RX 258: Performed by: NURSE PRACTITIONER

## 2023-01-30 PROCEDURE — 88305 TISSUE EXAM BY PATHOLOGIST: CPT

## 2023-01-30 PROCEDURE — 0DJD8ZZ INSPECTION OF LOWER INTESTINAL TRACT, VIA NATURAL OR ARTIFICIAL OPENING ENDOSCOPIC: ICD-10-PCS | Performed by: STUDENT IN AN ORGANIZED HEALTH CARE EDUCATION/TRAINING PROGRAM

## 2023-01-30 PROCEDURE — 6360000002 HC RX W HCPCS: Performed by: HOSPITALIST

## 2023-01-30 PROCEDURE — 3700000000 HC ANESTHESIA ATTENDED CARE: Performed by: STUDENT IN AN ORGANIZED HEALTH CARE EDUCATION/TRAINING PROGRAM

## 2023-01-30 PROCEDURE — 88312 SPECIAL STAINS GROUP 1: CPT

## 2023-01-30 PROCEDURE — A4216 STERILE WATER/SALINE, 10 ML: HCPCS | Performed by: NURSE PRACTITIONER

## 2023-01-30 PROCEDURE — 6360000002 HC RX W HCPCS: Performed by: NURSE PRACTITIONER

## 2023-01-30 PROCEDURE — 7100000010 HC PHASE II RECOVERY - FIRST 15 MIN: Performed by: STUDENT IN AN ORGANIZED HEALTH CARE EDUCATION/TRAINING PROGRAM

## 2023-01-30 PROCEDURE — 1100000000 HC RM PRIVATE

## 2023-01-30 PROCEDURE — 36415 COLL VENOUS BLD VENIPUNCTURE: CPT

## 2023-01-30 PROCEDURE — 2709999900 HC NON-CHARGEABLE SUPPLY: Performed by: STUDENT IN AN ORGANIZED HEALTH CARE EDUCATION/TRAINING PROGRAM

## 2023-01-30 PROCEDURE — 7100000011 HC PHASE II RECOVERY - ADDTL 15 MIN: Performed by: STUDENT IN AN ORGANIZED HEALTH CARE EDUCATION/TRAINING PROGRAM

## 2023-01-30 PROCEDURE — 6360000002 HC RX W HCPCS: Performed by: NURSE ANESTHETIST, CERTIFIED REGISTERED

## 2023-01-30 PROCEDURE — C9113 INJ PANTOPRAZOLE SODIUM, VIA: HCPCS | Performed by: NURSE PRACTITIONER

## 2023-01-30 PROCEDURE — 3609027000 HC COLONOSCOPY: Performed by: STUDENT IN AN ORGANIZED HEALTH CARE EDUCATION/TRAINING PROGRAM

## 2023-01-30 PROCEDURE — 85018 HEMOGLOBIN: CPT

## 2023-01-30 PROCEDURE — 0DB68ZX EXCISION OF STOMACH, VIA NATURAL OR ARTIFICIAL OPENING ENDOSCOPIC, DIAGNOSTIC: ICD-10-PCS | Performed by: STUDENT IN AN ORGANIZED HEALTH CARE EDUCATION/TRAINING PROGRAM

## 2023-01-30 PROCEDURE — 2580000003 HC RX 258: Performed by: NURSE ANESTHETIST, CERTIFIED REGISTERED

## 2023-01-30 RX ORDER — SODIUM CHLORIDE, SODIUM LACTATE, POTASSIUM CHLORIDE, CALCIUM CHLORIDE 600; 310; 30; 20 MG/100ML; MG/100ML; MG/100ML; MG/100ML
INJECTION, SOLUTION INTRAVENOUS CONTINUOUS PRN
Status: DISCONTINUED | OUTPATIENT
Start: 2023-01-30 | End: 2023-01-30 | Stop reason: SDUPTHER

## 2023-01-30 RX ORDER — PANTOPRAZOLE SODIUM 40 MG/1
40 TABLET, DELAYED RELEASE ORAL
Status: DISCONTINUED | OUTPATIENT
Start: 2023-01-30 | End: 2023-02-01 | Stop reason: HOSPADM

## 2023-01-30 RX ORDER — PROPOFOL 10 MG/ML
INJECTION, EMULSION INTRAVENOUS PRN
Status: DISCONTINUED | OUTPATIENT
Start: 2023-01-30 | End: 2023-01-30 | Stop reason: SDUPTHER

## 2023-01-30 RX ORDER — LIDOCAINE HYDROCHLORIDE 20 MG/ML
INJECTION, SOLUTION EPIDURAL; INFILTRATION; INTRACAUDAL; PERINEURAL PRN
Status: DISCONTINUED | OUTPATIENT
Start: 2023-01-30 | End: 2023-01-30 | Stop reason: SDUPTHER

## 2023-01-30 RX ADMIN — LIDOCAINE HYDROCHLORIDE 30 MG: 20 INJECTION, SOLUTION EPIDURAL; INFILTRATION; INTRACAUDAL; PERINEURAL at 12:27

## 2023-01-30 RX ADMIN — SODIUM CHLORIDE, PRESERVATIVE FREE 40 MG: 5 INJECTION INTRAVENOUS at 01:54

## 2023-01-30 RX ADMIN — CYANOCOBALAMIN 1000 MCG: 1000 INJECTION, SOLUTION INTRAMUSCULAR; SUBCUTANEOUS at 08:28

## 2023-01-30 RX ADMIN — PROPOFOL 200 MCG/KG/MIN: 10 INJECTION, EMULSION INTRAVENOUS at 12:28

## 2023-01-30 RX ADMIN — SODIUM CHLORIDE, PRESERVATIVE FREE 10 ML: 5 INJECTION INTRAVENOUS at 08:29

## 2023-01-30 RX ADMIN — SODIUM CHLORIDE, SODIUM LACTATE, POTASSIUM CHLORIDE, AND CALCIUM CHLORIDE: 600; 310; 30; 20 INJECTION, SOLUTION INTRAVENOUS at 13:02

## 2023-01-30 RX ADMIN — PANTOPRAZOLE SODIUM 40 MG: 40 TABLET, DELAYED RELEASE ORAL at 15:14

## 2023-01-30 RX ADMIN — PROPOFOL 30 MG: 10 INJECTION, EMULSION INTRAVENOUS at 12:27

## 2023-01-30 RX ADMIN — SODIUM CHLORIDE, PRESERVATIVE FREE 10 ML: 5 INJECTION INTRAVENOUS at 19:43

## 2023-01-30 RX ADMIN — SODIUM CHLORIDE, SODIUM LACTATE, POTASSIUM CHLORIDE, AND CALCIUM CHLORIDE: 600; 310; 30; 20 INJECTION, SOLUTION INTRAVENOUS at 12:17

## 2023-01-30 ASSESSMENT — PAIN - FUNCTIONAL ASSESSMENT
PAIN_FUNCTIONAL_ASSESSMENT: NONE - DENIES PAIN

## 2023-01-30 NOTE — CARE COORDINATION
Discharge planning was discussed with the Interdisciplinary Team. Per therapy services, the patient does not have any therapy needs for discharge planning.

## 2023-01-30 NOTE — OP NOTE
COLONOSCOPY    DATE of PROCEDURE:   1/30/2023    PRE-OP DIAGNOSIS:  Iron deficiency anemia  GI bleeding     POST-OP DIAGNOSIS:  Tortuous colon  Otherwise normal exam to the TI with good prep    MEDICATION:    MAC per anesthesia     INSTRUMENT:   [id number]    PROCEDURE:   After obtaining informed consent, the patient was placed in the left lateral position and sedated. The endoscope was advanced to the terminal ileum. The appendiceal orifice and ileocecal valve were identified. On withdrawal, the colon was carefully visualized in a 360 degree fashion. Retroflexion was performed in the rectum. The prep was good. The patient was taken to the recovery area in stable condition. FINDINGS:  Rectum: Normal rectal mucosa including on retroflexed view. Sigmoid: Tortuous segment, otherwise normal.  Descending Colon: Normal colonic mucosa. Splenic Flexure: Tortuous segment, otherwise normal.  Transverse Colon: Normal colonic mucosa. Hepatic Flexure: Tortuous segment, otherwise normal.  Ascending Colon: Retroflexion was performed with adequate visualization behind folds. Normal colonic mucosa. Cecum: The appendiceal orifice and IC valve were positively identified. Normal colonic mucosa. Terminal ileum: Normal.  *Liquid yellow/light brown stool throughout, no old or fresh blood noted.     ESTIMATED BLOOD LOSS:   Minimal.     PLAN:  - Resume diet  - May resume Plavix if clinically indicated  - IV iron while inpatient, PO on discharge  - If bleeding recurs, consider CTA/tagged RBC scan to localize bleed  - May benefit from VCE as OP, but no need for it currently  - No further recommendations from a GI standpoint, will arrange OP follow up, please call with questions/concerns      Ayo Vyas MD  [facility]

## 2023-01-30 NOTE — PROGRESS NOTES
Medical Student Progress Note   Admit Date:  2023  9:13 AM   Name:  Anita Sorensen   Age:  80 y.o. Sex:  female  :  1932   MRN:  [id number]   Room:  [facility]/01    Presenting Complaint: Dizziness    Reason(s) for Admission: GIB (gastrointestinal bleeding) [K92.2]  Transient vision disturbance [H53.9]  Gastrointestinal hemorrhage, unspecified gastrointestinal hemorrhage type [K92.2]  Anemia, unspecified type [D64.9]  GI bleed [facility] Course & Interval History:   80year old female with a pmhx of macular degeneration causing legal blindness, HTN, GERD, and dysphagia presented to the ED with complaints of visual changes. She was subsequently found to have a hgb of 5.5 with a GI bleed. She had 1 unit PRBC infusion and her hgb improved to 7.5. Awaiting EGD and colonoscopy today for further evaluation. Subjective (23): Pt states she is overall feeling well. She is having some lower abdominal discomfort and reports continued leakage per rectum. She denies any chest pain, SOB, weakness, dizziness.      Assessment & Plan:   GI Bleed  On ASA and Plavix at home, last dose 23  S/p 1 unit PRBC transfusion  Most recent Hgb 7.8  NPO, pending EGD and colonoscopy today      Hospital Problems:  Principal Problem:    GIB (gastrointestinal bleeding)  Active Problems:    Anemia    Macular degeneration    Glaucoma    GI bleed    Hypertension    Dysphagia    Visual impairment  Resolved Problems:    Visual changes      Objective:   Patient Vitals for the past 24 hrs:   Temp Pulse Resp BP SpO2   23 0750 97.3 °F (36.3 °C) 74 16 122/67 100 %   23 0351 98.2 °F (36.8 °C) 73 18 137/61 96 %   23 2326 98.1 °F (36.7 °C) 77 18 (!) 163/58 99 %   23 1943 98.1 °F (36.7 °C) 75 18 (!) 146/43 98 %   23 1545 98.1 °F (36.7 °C) 82 16 (!) 140/51 98 %   23 1119 98.1 °F (36.7 °C) 69 16 (!) 144/45 97 %       Oxygen Therapy  SpO2: 100 %  O2 Device: None (Room air)  O2 Flow Rate (L/min): 0 L/min    Estimated body mass index is 22.86 kg/m² as calculated from the following:    Height as of this encounter: 5' 1\" (1.549 m). Weight as of this encounter: 121 lb (54.9 kg). Intake/Output Summary (Last 24 hours) at 1/30/2023 0840  Last data filed at 1/29/2023 1306  Gross per 24 hour   Intake 540 ml   Output --   Net 540 ml         Physical Exam:   Blood pressure 122/67, pulse 74, temperature 97.3 °F (36.3 °C), temperature source Oral, resp. rate 16, height 5' 1\" (1.549 m), weight 121 lb (54.9 kg), SpO2 100 %. General:    Well nourished. Head:  Normocephalic, atraumatic  CV:   RRR. No m/r/g. No jugular venous distension. Lungs:   CTAB. No wheezing, rhonchi, or rales. Symmetric expansion. Abdomen:   Soft, nondistended, mild LLQ tenderness  Extremities: No cyanosis or clubbing. No edema  Skin:     No rashes and normal coloration. Warm and dry. Psych:  Normal mood and affect. I have personally reviewed labs and tests showing:  Recent Labs:  Recent Results (from the past 48 hour(s))   PLEASE READ & DOCUMENT PPD TEST IN 24 HRS    Collection Time: 01/28/23  1:11 PM   Result Value Ref Range    PPD, (POC) Negative Negative    mm Induration 0 0 - 5 mm   PLEASE READ & DOCUMENT PPD TEST IN 48 HRS    Collection Time: 01/29/23 12:00 AM   Result Value Ref Range    PPD, (POC) Negative Negative    mm Induration 0 0 - 5 mm   Hemoglobin    Collection Time: 01/29/23  4:35 AM   Result Value Ref Range    Hemoglobin 7.5 (L) 11.7 - 15.4 g/dL   Hemoglobin    Collection Time: 01/30/23  5:40 AM   Result Value Ref Range    Hemoglobin 7.8 (L) 11.7 - 15.4 g/dL       I have personally reviewed imaging studies showing: Other Studies:  CT HEAD WO CONTRAST   Final Result      1. No acute intracranial abnormality. 2. Chronic small vessel ischemic changes. 3. Chronic maxillary sinus disease. XR CHEST (2 VW)   Final Result   No acute airspace disease.              Current Meds:  Current Facility-Administered Medications   Medication Dose Route Frequency    cyanocobalamin injection 1,000 mcg  1,000 mcg IntraMUSCular Daily    amLODIPine (NORVASC) tablet 5 mg  5 mg Oral Daily    cloNIDine (CATAPRES) tablet 0.2 mg  0.2 mg Oral BID PRN    sodium chloride flush 0.9 % injection 5-40 mL  5-40 mL IntraVENous 2 times per day    sodium chloride flush 0.9 % injection 5-40 mL  5-40 mL IntraVENous PRN    0.9 % sodium chloride infusion   IntraVENous PRN    ondansetron (ZOFRAN-ODT) disintegrating tablet 4 mg  4 mg Oral Q8H PRN    Or    ondansetron (ZOFRAN) injection 4 mg  4 mg IntraVENous Q6H PRN    acetaminophen (TYLENOL) tablet 650 mg  650 mg Oral Q6H PRN    Or    acetaminophen (TYLENOL) suppository 650 mg  650 mg Rectal Q6H PRN    pantoprazole (PROTONIX) 40 mg in sodium chloride (PF) 0.9 % 10 mL injection  40 mg IntraVENous Q12H    0.9 % sodium chloride bolus  100 mL IntraVENous ONCE PRN    iopamidol (ISOVUE-370) 76 % injection 50 mL  50 mL IntraVENous ONCE PRN    sodium chloride flush 0.9 % injection 10 mL  10 mL IntraVENous ONCE PRN       Signed:  Chrissy Chavez  [facility]    Part of this note may have been written by using a voice dictation software. The note has been proof read but may still contain some grammatical/other typographical errors.

## 2023-01-30 NOTE — INTERVAL H&P NOTE
Update History & Physical    The patient's History and Physical of January 26, 2023 was reviewed with the patient and I examined the patient. There was no change. The surgical site was confirmed by the patient and me. Plan: The risks, benefits, expected outcome, and alternative to the recommended procedure have been discussed with the patient. Patient understands and wants to proceed with the procedure.      Electronically signed by Reyna Alba MD on 1/30/2023 at 11:50 AM

## 2023-01-30 NOTE — PROGRESS NOTES
Hospitalist Progress Note   Admit Date:  2023  9:13 AM   Name:  Darryn Jensen   Age:  80 y.o. Sex:  female  :  1932   MRN:  [id number]   Room:  [facility]/    Presenting Complaint: Dizziness     Reason(s) for Admission: GIB (gastrointestinal bleeding) [K92.2]  Transient vision disturbance [H53.9]  Gastrointestinal hemorrhage, unspecified gastrointestinal hemorrhage type [K92.2]  Anemia, unspecified type [D64.9]  GI bleed [K92.2]     Hospital Course:   Copied from prior provider HPI:    80 y.o. female with medical history of macular degeneration causing legal blindness, HTN, GERD, dysphagia who presented with concerns of decrease visual changes however found to have Hg of 5.5 and a GIB. She has since had PRBC infusions with some continued janae blood in her stool. Subjective & 24hr Events (23): Hemoglobin stable around 7-1/2. EGD with Riesa Can ulcers at Z-line and hiatal hernia noted. Colonoscopy no obvious source. PPI increased to twice daily. Hopefully diet to advance and home soon with family. Assessment & Plan:      1- GIB (gastrointestinal bleeding), lower per hx, on ASA and plavix at home. S/p PRBC 1 unit transfusion. Irona dn B12 deficient. -hiatal hernia and Reji ulcers noted at Z-line on EGD. Twice daily PPI. Avoid all NSAIDs. Hopefully diet advanced and home soon. Hemoglobin stable around 7.5-monitor. Was placed on Plavix and aspirin regarding visual abnormalities and concern was TIA-we will need to hold and recommend follow-up outpatient for decision about timing restart after appropriate course PPI      2- Hx of  Macular degeneration, Glaucoma, dysphagia, HTN. Stable. -no changes.                   Assessment & Plan:     As above  Hospital Problems:  Principal Problem:    GIB (gastrointestinal bleeding)  Active Problems:    Anemia    Macular degeneration    Glaucoma    GI bleed    Hypertension    Dysphagia    Visual impairment  Resolved Problems: Visual changes      Objective:   Patient Vitals for the past 24 hrs:   Temp Pulse Resp BP SpO2   01/30/23 1523 98.1 °F (36.7 °C) 71 15 (!) 148/50 100 %   01/30/23 1336 -- 64 14 (!) 114/53 98 %   01/30/23 1331 -- 63 14 (!) 124/58 99 %   01/30/23 1325 -- 68 14 (!) 105/52 98 %   01/30/23 1321 -- 81 14 (!) 90/49 97 %   01/30/23 1315 -- 73 14 (!) 92/48 96 %   01/30/23 1314 -- 73 14 (!) 84/44 96 %   01/30/23 1312 -- 75 -- -- --   01/30/23 1311 -- 77 14 (!) 82/47 100 %   01/30/23 1113 97.7 °F (36.5 °C) 76 18 (!) 148/84 98 %   01/30/23 0750 97.3 °F (36.3 °C) 74 16 122/67 100 %   01/30/23 0351 98.2 °F (36.8 °C) 73 18 137/61 96 %   01/29/23 2326 98.1 °F (36.7 °C) 77 18 (!) 163/58 99 %   01/29/23 1943 98.1 °F (36.7 °C) 75 18 (!) 146/43 98 %       Oxygen Therapy  SpO2: 100 %  Pulse via Oximetry: 64 beats per minute  Pulse Oximeter Device Mode: Continuous  O2 Device: None (Room air)  O2 Flow Rate (L/min): 0 L/min    Estimated body mass index is 22.86 kg/m² as calculated from the following:    Height as of this encounter: 5' 1\" (1.549 m). Weight as of this encounter: 121 lb (54.9 kg). Intake/Output Summary (Last 24 hours) at 1/30/2023 1559  Last data filed at 1/30/2023 1305  Gross per 24 hour   Intake 500 ml   Output 0 ml   Net 500 ml         Physical Exam:     Blood pressure (!) 148/50, pulse 71, temperature 98.1 °F (36.7 °C), temperature source Oral, resp. rate 15, height 5' 1\" (1.549 m), weight 121 lb (54.9 kg), SpO2 100 %. General:    Well nourished. Head:  Normocephalic, atraumatic  Eyes:  Sclerae appear normal.  Pupils equally round. ENT:  Nares appear normal.  Moist oral mucosa  Neck:  No restricted ROM. Trachea midline   CV:   No new S3 gallop or new murmurs. Regular rate and rhythm. Lungs:   CTAB. No wheezing, rhonchi, or rales. Symmetric expansion. Abdomen:   Soft, nontender, nondistended. Extremities: No cyanosis or clubbing.   No unilateral lower extremity edema  Skin:     No rashes and normal coloration. Warm and dry. Neuro:  CN II-XII grossly intact. Psych:  Normal mood and affect. I have personally reviewed labs and tests:  Recent Labs:  Recent Results (from the past 48 hour(s))   PLEASE READ & DOCUMENT PPD TEST IN 48 HRS    Collection Time: 01/29/23 12:00 AM   Result Value Ref Range    PPD, (POC) Negative Negative    mm Induration 0 0 - 5 mm   Hemoglobin    Collection Time: 01/29/23  4:35 AM   Result Value Ref Range    Hemoglobin 7.5 (L) 11.7 - 15.4 g/dL   Hemoglobin    Collection Time: 01/30/23  5:40 AM   Result Value Ref Range    Hemoglobin 7.8 (L) 11.7 - 15.4 g/dL   PLEASE READ & DOCUMENT PPD TEST IN 72 HRS    Collection Time: 01/30/23 10:44 AM   Result Value Ref Range    PPD, (POC) Negative Negative    mm Induration 0 0 - 5 mm       I have personally reviewed imaging studies:  Other Studies:  CT HEAD WO CONTRAST   Final Result      1. No acute intracranial abnormality. 2. Chronic small vessel ischemic changes. 3. Chronic maxillary sinus disease. XR CHEST (2 VW)   Final Result   No acute airspace disease.              Current Meds:  Current Facility-Administered Medications   Medication Dose Route Frequency    pantoprazole (PROTONIX) tablet 40 mg  40 mg Oral QAM AC    cyanocobalamin injection 1,000 mcg  1,000 mcg IntraMUSCular Daily    amLODIPine (NORVASC) tablet 5 mg  5 mg Oral Daily    cloNIDine (CATAPRES) tablet 0.2 mg  0.2 mg Oral BID PRN    sodium chloride flush 0.9 % injection 5-40 mL  5-40 mL IntraVENous 2 times per day    sodium chloride flush 0.9 % injection 5-40 mL  5-40 mL IntraVENous PRN    0.9 % sodium chloride infusion   IntraVENous PRN    ondansetron (ZOFRAN-ODT) disintegrating tablet 4 mg  4 mg Oral Q8H PRN    Or    ondansetron (ZOFRAN) injection 4 mg  4 mg IntraVENous Q6H PRN    acetaminophen (TYLENOL) tablet 650 mg  650 mg Oral Q6H PRN    Or    acetaminophen (TYLENOL) suppository 650 mg  650 mg Rectal Q6H PRN    0.9 % sodium chloride bolus  100 mL IntraVENous ONCE PRN    iopamidol (ISOVUE-370) 76 % injection 50 mL  50 mL IntraVENous ONCE PRN    sodium chloride flush 0.9 % injection 10 mL  10 mL IntraVENous ONCE PRN       Signed:  Rafa Mckeon, DO    Part of this note may have been written by using a voice dictation software. The note has been proof read but may still contain some grammatical/other typographical errors.

## 2023-01-30 NOTE — OP NOTE
ESOPHAGOGASTRODUODENOSCOPY    DATE of PROCEDURE:   1/30/2023    PRE-OP DIAGNOSIS:  Iron deficiency anemia  GI bleeding     POST-OP DIAGNOSIS:  Hiatal hernia    MEDICATIONS ADMINISTERED:   MAC per anesthesia    INSTRUMENT:   [id number]    PROCEDURE:    After obtaining informed consent, the patient was placed in the left lateral position and sedated. The endoscope was advanced to the 2nd portion of the duodenum under direct vision without difficulty. The esophagus, stomach (including retroflexed views) and duodenum were evaluated. The patient was taken to the recovery area in stable condition. FINDINGS:  ESOPHAGUS: Normal proximal, mid, and distal esophagus. STOMACH: A 5 cm sliding-type hiatal hernia without Reji ulcers was noted with Z-line located at 35 cm and diaphragmatic hiatus at 40 cm from the incisors. The flap-valve is considered Hill grade IV on retroflexed view. Otherwise normal gastric mucosa throughout. Random gastric biopsies were obtained. DUODENUM: Normal bulb and 2nd portion. Duodenal biopsies were obtained.     ESTIMATED BLOOD LOSS:  Minimal.    PLAN:  - Follow up pathology, will treat H pylori if positive  - Can change PPI from IV BID to PO qday  - Avoid NSAIDs  - Proceed with colonoscopy      Melvin Wilson MD  [facility]

## 2023-01-30 NOTE — ANESTHESIA PRE PROCEDURE
Department of Anesthesiology  Preprocedure Note       Name:  Gabby Crandall   Age:  80 y.o.  :  1932                                          MRN:  [id number]         Date:  2023      Surgeon: Olga Narvaez):  Eliz Jasso MD    Procedure: Procedure(s):  EGD ESOPHAGOGASTRODUODENOSCOPY  COLONOSCOPY DIAGNOSTIC    Medications prior to admission:   Prior to Admission medications    Medication Sig Start Date End Date Taking?  Authorizing Provider   furosemide (LASIX) 20 MG tablet 1 qod for fluid  Patient not taking: Reported on 2023   Hermes Albright MD   zoledronic acid (RECLAST) 5 MG/100ML SOLN Infuse 100 mLs intravenously once for 1 dose 22  W Deyanira Roque MD   clopidogrel (PLAVIX) 75 MG tablet Take 1 tablet by mouth daily 22   Hermes Albright MD   aspirin 81 MG EC tablet Take 81 mg by mouth daily    Ar Automatic Reconciliation   vitamin D 25 MCG (1000 UT) CAPS Take 1,000 Units by mouth daily    Ar Automatic Reconciliation       Current medications:    Current Facility-Administered Medications   Medication Dose Route Frequency Provider Last Rate Last Admin    cyanocobalamin injection 1,000 mcg  1,000 mcg IntraMUSCular Daily Juan Ramon Luong MD   1,000 mcg at 23 0828    amLODIPine (NORVASC) tablet 5 mg  5 mg Oral Daily Juan Ramon Luong MD   5 mg at 23 0366    cloNIDine (CATAPRES) tablet 0.2 mg  0.2 mg Oral BID PRN Juan Ramon Luong MD        sodium chloride flush 0.9 % injection 5-40 mL  5-40 mL IntraVENous 2 times per day Corina Gang, APRN - CNP   10 mL at 23 0829    sodium chloride flush 0.9 % injection 5-40 mL  5-40 mL IntraVENous PRN Corina Gang, APRN - CNP   10 mL at 23 0023    0.9 % sodium chloride infusion   IntraVENous PRN Corina Gang, APRN - CNP        ondansetron (ZOFRAN-ODT) disintegrating tablet 4 mg  4 mg Oral Q8H PRN Corina Gang, APRN - CNP        Or    ondansetron [facility]) injection 4 mg  4 mg IntraVENous Q6H PRN Corina Gang, APRN - CNP  acetaminophen (TYLENOL) tablet 650 mg  650 mg Oral Q6H PRN Sharonda Keys, APRN - CNP        Or    acetaminophen (TYLENOL) suppository 650 mg  650 mg Rectal Q6H PRN Sharonda Keys, APRN - CNP        pantoprazole (PROTONIX) 40 mg in sodium chloride (PF) 0.9 % 10 mL injection  40 mg IntraVENous Q12H Sharonda Keys, APRN - CNP   40 mg at 01/30/23 0154    0.9 % sodium chloride bolus  100 mL IntraVENous ONCE PRN Sharonda Keys, APRN - CNP        iopamidol (ISOVUE-370) 76 % injection 50 mL  50 mL IntraVENous ONCE PRN Sharonda Keys, APRN - CNP        sodium chloride flush 0.9 % injection 10 mL  10 mL IntraVENous ONCE PRN Sharonda Keys, APRN - CNP           Allergies:     Allergies   Allergen Reactions    Atorvastatin Other (See Comments)     Muscle aches    Ezetimibe-Simvastatin Other (See Comments)     Hair loss    Sulfa Antibiotics Rash       Problem List:    Patient Active Problem List   Diagnosis Code    Esophageal dysmotility K22.4    Osteopenia M85.80    Osteoarthritis, hand M19.049    Visual impairment severe bilaterally H54.2X22    Hypertension I10    DDD (degenerative disc disease), lumbar M51.36    GERD (gastroesophageal reflux disease) K21.9    Dysphagia R13.10    Visual impairment H54.7    Impaired fasting glucose R73.01    GIB (gastrointestinal bleeding) K92.2    Anemia D64.9    Macular degeneration H35.30    Glaucoma H40.9    GI bleed K92.2       Past Medical History:        Diagnosis Date    Arthritis     Chest pain 9/11/2014    Dysphagia 9/11/2014    Edema 9/11/2014    Esophageal dysmotility 9/12/2014    Hypercholesterolemia     Hypertension 9/11/2014    Other ill-defined conditions(799.89)     high cholesterol    Shingles        Past Surgical History:        Procedure Laterality Date    APPENDECTOMY      incidental    COLONOSCOPY  10/03    HYSTERECTOMY (CERVIX STATUS UNKNOWN)         Social History:    Social History     Tobacco Use    Smoking status: Never    Smokeless tobacco: Never   Substance Use Topics    Alcohol use: Yes                                Counseling given: Not Answered      Vital Signs (Current):   Vitals:    01/29/23 2326 01/30/23 0351 01/30/23 0750 01/30/23 1113   BP: (!) 163/58 137/61 122/67 (!) 148/84   Pulse: 77 73 74 76   Resp: 18 18 16 18   Temp: 98.1 °F (36.7 °C) 98.2 °F (36.8 °C) 97.3 °F (36.3 °C) 97.7 °F (36.5 °C)   TempSrc: Oral Oral Oral Oral   SpO2: 99% 96% 100% 98%   Weight:       Height:                                                  BP Readings from Last 3 Encounters:   01/30/23 (!) 148/84   11/08/22 (!) 156/70   09/01/22 (!) 174/77       NPO Status:                                                                                 BMI:   Wt Readings from Last 3 Encounters:   01/26/23 121 lb (54.9 kg)   11/08/22 125 lb (56.7 kg)   09/01/22 122 lb 3.2 oz (55.4 kg)     Body mass index is 22.86 kg/m². CBC:   Lab Results   Component Value Date/Time    WBC 6.4 01/26/2023 11:32 AM    RBC 2.30 01/26/2023 11:32 AM    HGB 7.8 01/30/2023 05:40 AM    HCT 26.0 01/28/2023 04:37 AM    MCV 78.7 01/26/2023 11:32 AM    RDW 15.9 01/26/2023 11:32 AM     01/26/2023 11:32 AM       CMP:   Lab Results   Component Value Date/Time     01/28/2023 04:37 AM    K 4.0 01/28/2023 04:37 AM     01/28/2023 04:37 AM    CO2 25 01/28/2023 04:37 AM    BUN 9 01/28/2023 04:37 AM    CREATININE 0.61 01/28/2023 04:37 AM    GFRAA >60 08/23/2022 11:02 AM    AGRATIO 1.9 08/13/2021 10:41 AM    LABGLOM >60 01/28/2023 04:37 AM    GLUCOSE 167 01/28/2023 04:37 AM    PROT 6.2 01/26/2023 11:32 AM    CALCIUM 8.5 01/28/2023 04:37 AM    BILITOT 0.3 01/26/2023 11:32 AM    ALKPHOS 60 01/26/2023 11:32 AM    ALKPHOS 82 08/13/2021 10:41 AM    AST 13 01/26/2023 11:32 AM    ALT 18 01/26/2023 11:32 AM       POC Tests: No results for input(s): POCGLU, POCNA, POCK, POCCL, POCBUN, POCHEMO, POCHCT in the last 72 hours.     Coags:   Lab Results   Component Value Date/Time    PROTIME 14.2 01/27/2023 05:23 AM    INR 1.1 01/27/2023 05:23 AM    APTT 22.1 01/27/2023 05:23 AM       HCG (If Applicable): No results found for: PREGTESTUR, PREGSERUM, HCG, HCGQUANT     ABGs: No results found for: PHART, PO2ART, BTY8MIA, YMT2VEX, BEART, Q5EXCOMR     Type & Screen (If Applicable):  No results found for: LABABO, LABRH    Drug/Infectious Status (If Applicable):  No results found for: HIV, HEPCAB    COVID-19 Screening (If Applicable): No results found for: COVID19        Anesthesia Evaluation  Patient summary reviewed  Airway: Mallampati: II          Dental: normal exam         Pulmonary:Negative Pulmonary ROS and normal exam  breath sounds clear to auscultation                             Cardiovascular:  Exercise tolerance: good (>4 METS),   (+) hypertension:,         Rhythm: regular  Rate: normal                    Neuro/Psych:   Negative Neuro/Psych ROS              GI/Hepatic/Renal:   (+) GERD: well controlled,           Endo/Other:    (+) blood dyscrasia: anemia:., .                 Abdominal:             Vascular: negative vascular ROS. Other Findings: 81 yo F with GIB for EGD/Colonoscopy. Hgb initially 5.5, transfused up to mid 7's and stable. HD stable on floor          Anesthesia Plan      TIVA     ASA 3       Induction: intravenous. Anesthetic plan and risks discussed with patient. Plan discussed with CRNA.                     Addis Flaherty DO   1/30/2023

## 2023-01-30 NOTE — ANESTHESIA POSTPROCEDURE EVALUATION
Department of Anesthesiology  Postprocedure Note    Patient: Anita Sorensen  MRN: [id number]  YOB: 1932  Date of evaluation: 1/30/2023      Procedure Summary     Date: 01/30/23 Room / Location: [facility] ENDO 01 / [facility] ENDOSCOPY    Anesthesia Start: 1217 Anesthesia Stop: 5872    Procedures:       COLONOSCOPY DIAGNOSTIC (Lower GI Region)      EGD BIOPSY (Upper GI Region) Diagnosis:       Gastrointestinal hemorrhage, unspecified gastrointestinal hemorrhage type      (Gastrointestinal hemorrhage, unspecified gastrointestinal hemorrhage type [K92.2])    Providers: Reyna Alba MD Responsible Provider: Yulisa Tillman DO    Anesthesia Type: TIVA ASA Status: 3          Anesthesia Type: No value filed.     Asa Phase I:      Asa Phase II: Asa Score: 10      Anesthesia Post Evaluation    Patient location during evaluation: PACU  Level of consciousness: awake and alert  Airway patency: patent  Nausea & Vomiting: no nausea  Complications: no  Cardiovascular status: hemodynamically stable  Respiratory status: acceptable  Hydration status: euvolemic

## 2023-01-30 NOTE — PROGRESS NOTES
TRANSFER - OUT REPORT:    Verbal report given to [facility] on Amy Turner  being transferred to [address] [place] [address] for routine progression of patient care       Report consisted of patient's Situation, Background, Assessment and   Recommendations(SBAR). Information from the following report(s) Nurse Handoff Report was reviewed with the receiving nurse. [place] Assessment: No data recorded  Lines:   Peripheral IV 01/26/23 Left Antecubital (Active)   Site Assessment Clean, dry & intact 01/30/23 0720   Line Status Flushed 01/30/23 0720   Line Care Connections checked and tightened 01/30/23 0720   Phlebitis Assessment No symptoms 01/30/23 0720   Infiltration Assessment 0 01/30/23 0720   Alcohol Cap Used Yes 01/30/23 0720   Dressing Status Clean, dry & intact 01/30/23 0720   Dressing Type Transparent 01/30/23 0720       Peripheral IV 01/26/23 Right Antecubital (Active)   Site Assessment Clean, dry & intact 01/29/23 1940   Line Status Flushed 01/29/23 1940   Line Care Connections checked and tightened 01/29/23 1940   Phlebitis Assessment No symptoms 01/29/23 1940   Infiltration Assessment 0 01/29/23 0814   Alcohol Cap Used Yes 01/28/23 2040   Dressing Status Clean, dry & intact 01/29/23 1940   Dressing Type Transparent 01/29/23 1940        Opportunity for questions and clarification was provided.       Patient transported with:  [employer]

## 2023-01-31 ENCOUNTER — HOME HEALTH ADMISSION (OUTPATIENT)
Dept: HOME HEALTH SERVICES | Facility: HOME HEALTH | Age: 88
End: 2023-01-31

## 2023-01-31 LAB
ALBUMIN SERPL-MCNC: 2.6 G/DL (ref 3.2–4.6)
ALBUMIN/GLOB SERPL: 1 (ref 0.4–1.6)
ALP SERPL-CCNC: 78 U/L (ref 50–130)
ALT SERPL-CCNC: 18 U/L (ref 12–65)
ANION GAP SERPL CALC-SCNC: 5 MMOL/L (ref 2–11)
AST SERPL-CCNC: 16 U/L (ref 15–37)
BASOPHILS # BLD: 0 K/UL (ref 0–0.2)
BASOPHILS NFR BLD: 0 % (ref 0–2)
BILIRUB SERPL-MCNC: 0.2 MG/DL (ref 0.2–1.1)
BUN SERPL-MCNC: 9 MG/DL (ref 8–23)
CALCIUM SERPL-MCNC: 8.5 MG/DL (ref 8.3–10.4)
CHLORIDE SERPL-SCNC: 111 MMOL/L (ref 101–110)
CO2 SERPL-SCNC: 25 MMOL/L (ref 21–32)
CREAT SERPL-MCNC: 0.65 MG/DL (ref 0.6–1)
DIFFERENTIAL METHOD BLD: ABNORMAL
EOSINOPHIL # BLD: 0.2 K/UL (ref 0–0.8)
EOSINOPHIL NFR BLD: 3 % (ref 0.5–7.8)
ERYTHROCYTE [DISTWIDTH] IN BLOOD BY AUTOMATED COUNT: 16.9 % (ref 11.9–14.6)
GLOBULIN SER CALC-MCNC: 2.7 G/DL (ref 2.8–4.5)
GLUCOSE SERPL-MCNC: 158 MG/DL (ref 65–100)
HCT VFR BLD AUTO: 23.8 % (ref 35.8–46.3)
HCT VFR BLD AUTO: 25.4 % (ref 35.8–46.3)
HGB BLD-MCNC: 7.2 G/DL (ref 11.7–15.4)
HGB BLD-MCNC: 7.6 G/DL (ref 11.7–15.4)
IMM GRANULOCYTES # BLD AUTO: 0.1 K/UL (ref 0–0.5)
IMM GRANULOCYTES NFR BLD AUTO: 1 % (ref 0–5)
LYMPHOCYTES # BLD: 1.9 K/UL (ref 0.5–4.6)
LYMPHOCYTES NFR BLD: 31 % (ref 13–44)
MAGNESIUM SERPL-MCNC: 2.1 MG/DL (ref 1.8–2.4)
MCH RBC QN AUTO: 24.3 PG (ref 26.1–32.9)
MCHC RBC AUTO-ENTMCNC: 30.3 G/DL (ref 31.4–35)
MCV RBC AUTO: 80.4 FL (ref 82–102)
MONOCYTES # BLD: 0.7 K/UL (ref 0.1–1.3)
MONOCYTES NFR BLD: 12 % (ref 4–12)
NEUTS SEG # BLD: 3.1 K/UL (ref 1.7–8.2)
NEUTS SEG NFR BLD: 52 % (ref 43–78)
NRBC # BLD: 0.07 K/UL (ref 0–0.2)
PLATELET # BLD AUTO: 299 K/UL (ref 150–450)
PMV BLD AUTO: 10 FL (ref 9.4–12.3)
POTASSIUM SERPL-SCNC: 3.9 MMOL/L (ref 3.5–5.1)
PROT SERPL-MCNC: 5.3 G/DL (ref 6.3–8.2)
RBC # BLD AUTO: 2.96 M/UL (ref 4.05–5.2)
SODIUM SERPL-SCNC: 141 MMOL/L (ref 133–143)
WBC # BLD AUTO: 5.9 K/UL (ref 4.3–11.1)

## 2023-01-31 PROCEDURE — 6360000002 HC RX W HCPCS: Performed by: HOSPITALIST

## 2023-01-31 PROCEDURE — 85025 COMPLETE CBC W/AUTO DIFF WBC: CPT

## 2023-01-31 PROCEDURE — 97535 SELF CARE MNGMENT TRAINING: CPT

## 2023-01-31 PROCEDURE — 97165 OT EVAL LOW COMPLEX 30 MIN: CPT

## 2023-01-31 PROCEDURE — 85014 HEMATOCRIT: CPT

## 2023-01-31 PROCEDURE — 6370000000 HC RX 637 (ALT 250 FOR IP): Performed by: STUDENT IN AN ORGANIZED HEALTH CARE EDUCATION/TRAINING PROGRAM

## 2023-01-31 PROCEDURE — 36415 COLL VENOUS BLD VENIPUNCTURE: CPT

## 2023-01-31 PROCEDURE — 1100000000 HC RM PRIVATE

## 2023-01-31 PROCEDURE — 2580000003 HC RX 258: Performed by: NURSE PRACTITIONER

## 2023-01-31 PROCEDURE — 83735 ASSAY OF MAGNESIUM: CPT

## 2023-01-31 PROCEDURE — 80053 COMPREHEN METABOLIC PANEL: CPT

## 2023-01-31 PROCEDURE — 6370000000 HC RX 637 (ALT 250 FOR IP): Performed by: HOSPITALIST

## 2023-01-31 RX ADMIN — AMLODIPINE BESYLATE 5 MG: 5 TABLET ORAL at 09:15

## 2023-01-31 RX ADMIN — SODIUM CHLORIDE, PRESERVATIVE FREE 10 ML: 5 INJECTION INTRAVENOUS at 09:15

## 2023-01-31 RX ADMIN — PANTOPRAZOLE SODIUM 40 MG: 40 TABLET, DELAYED RELEASE ORAL at 05:01

## 2023-01-31 RX ADMIN — SODIUM CHLORIDE, PRESERVATIVE FREE 10 ML: 5 INJECTION INTRAVENOUS at 19:21

## 2023-01-31 RX ADMIN — CYANOCOBALAMIN 1000 MCG: 1000 INJECTION, SOLUTION INTRAMUSCULAR; SUBCUTANEOUS at 09:16

## 2023-01-31 ASSESSMENT — PAIN SCALES - GENERAL
PAINLEVEL_OUTOF10: 0

## 2023-01-31 NOTE — CARE COORDINATION
RN CM met with the patient at the bedside and discussed the recommendation for home health services. The patient was provided with a list of home health agencies and their quality metrics. Since the patient is blind, RN CM read the list to her. She is in agreement with the recommendation and [facility]. RN CM sent them a referral. She requested for case management to contact her son Rosa Isela Tracy [phone] and her daughter-in-law Francesca Kimball, [phone]. Matilda Roberson will transport her home at discharge. An [employer] magazine was left at the bedside. CARLOS HILL called her son Meche Cervantes and discussed discharge planning, including the recommendation for home health services. His e-mail address was confirmed with read back. RN CM sent him a copy of the list of home health agencies and their quality metrics. He stated the patient has a long term care policy. He said he lives in [place] and expressed concerns about the patient being alone. He inquired about rehabilitation. RN CM informed him that therapy did not recommend any continued therapy needs. RN CM discussed the option of family arranging for caregivers to stay with the patient or arranging for possible placement in an assisted living facility. He confirmed he is familiar with agencies like [address] [place] [address], [employer], and [facility] [employer] [facility]. He stated he can come and stay with the patient but he will have to have time to travel from [place]. He would like to speak with the attending MD. RN CM notified the physician of his request.     RN CM spoke with therapy services. Per the therapist, the patient was discharged from their services.

## 2023-01-31 NOTE — CARE COORDINATION
RN CM spoke with the patient's son Mario over the telephone. He stated he wants the patient to discharge to a skilled nursing facility for short term rehabilitation and is interested in [facility] and [facility]. The patient was discharged from therapy services. He stated he is waiting for a return phone call from [facility]. Per his report, the patient had some confusion over the weekend. He is concerned about the patient being left alone. RN CM encouraged him to hire caregivers or consider looking into assisted living facilities.    A referral was sent to [facility] per Mario's request. RN CM received a phone call from Andre at [facility]. The referral is currently being reviewed.

## 2023-01-31 NOTE — PROGRESS NOTES
ACUTE OCCUPATIONAL THERAPY GOALS:   (Developed with and agreed upon by patient and/or caregiver.)  Pt will be supervision to  (I) with ADL's and ambulated short distances. OCCUPATIONAL THERAPY Initial Assessment, Daily Note, Discharge, and PM          Acknowledge Orders  Time  OT Charge Capture  Rehab Caseload Tracker      Gabby Crandall is a 80 y.o. female   PRIMARY DIAGNOSIS: GIB (gastrointestinal bleeding)  GIB (gastrointestinal bleeding) [K92.2]  Transient vision disturbance [H53.9]  Gastrointestinal hemorrhage, unspecified gastrointestinal hemorrhage type [K92.2]  Anemia, unspecified type [D64.9]  GI bleed [K92.2]  Procedure(s) (LRB):  COLONOSCOPY DIAGNOSTIC (N/A)  EGD BIOPSY (N/A)  1 Day Post-Op  Reason for Referral: Generalized Muscle Weakness (M62.81)  Inpatient: Payor: MEDICARE / Plan: MEDICARE PART A AND B / Product Type: *No Product type* /     ASSESSMENT:     REHAB RECOMMENDATIONS:   Recommendation to date pending progress:  Setting:  No further skilled therapy after discharge from hospital    Equipment:    None     ASSESSMENT:  Ms. Shanna Tavares was admitted with above diagnosis. He HGB is noted to be stable at 7.2. Pt was seen in room. Pt was curled up in bed on right side upon arrival. Pt was very happy to get up and move. Pt was set up with her clothes to include clean mesh underwear, bra, long sleeve shirt, pants, doffed socks and donned slip on shoes with supervision and verbal cues due to low vision. Pt was provided with a brush and groomed without assistance. Pt ambulated in [facility] community distance, being guided for vision impairment only. Pt returned to room and set up in recliner with all needs. Pt moves well with verbal and tactile cues for vision loss only. Pt will always require some setup to supervision assistance when outside her home. Pt currently has no new deficits and should do well at home with continue family and Christianity family support.          [address] [facility] [address] [address] AM-PAC 3 Clicks” Daily Activity Inpatient Short Form:    AM-PAC Daily Activity Inpatient   How much help for putting on and taking off regular lower body clothing?: None  How much help for Bathing?: None  How much help for Toileting?: None  How much help for putting on and taking off regular upper body clothing?: None  How much help for taking care of personal grooming?: None  How much help for eating meals?: None  AM-[facility] Inpatient Daily Activity Raw Score: 24  AM-PAC Inpatient ADL T-Scale Score : 57.54  ADL Inpatient CMS 0-100% Score: 0  ADL Inpatient CMS G-Code Modifier : CH           SUBJECTIVE:     Ms. Nobles states, \"I would love to walk, how far can we go\"     Social/Functional Lives With: Alone  Type of Home: House  Home Layout: One level  Home Access: Ramped entrance  Bathroom Shower/Tub: Tub/Shower unit    OBJECTIVE:     LINES / DRAINS / AIRWAY: None    RESTRICTIONS/PRECAUTIONS:       PAIN: VITALS / O2:   Pre Treatment:          Post Treatment: none        Vitals          Oxygen            GROSS EVALUATION: INTACT IMPAIRED   (See Comments)   UE AROM [x] []   UE PROM [x] []   Strength [x]       Posture / Balance [x]     Sensation [x]     Coordination [x]       Tone [x]       Edema []    Activity Tolerance [x]       Hand Dominance R [] L []      COGNITION/  PERCEPTION: INTACT IMPAIRED   (See Comments)   Orientation [x]     Vision []  Legally blind    Hearing [x]  Hearing aides    Cognition  [x]     Perception [x]       MOBILITY: I Mod I S SBA CGA Min Mod Max Total  NT x2 Comments:   Bed Mobility    Rolling [] [] [] [] [] [] [] [] [] [] []    Supine to Sit [] [] [x] [] [] [] [] [] [] [] []    Scooting [] [] [x] [] [] [] [] [] [] [] []    Sit to Supine [] [] [] [] [] [] [] [] [] [] []    Transfers    Sit to Stand [] [] [x] [] [] [] [] [] [] [] []    Bed to Chair [] [] [x] [] [] [] [] [] [] [] []    Stand to Sit [] [] [x] [] [] [] [] [] [] [] []    Tub/Shower [] [] [x] [] [] [] [] [] [] [] []     Toilet [] [] [x] [] []  [] [] [] [] [] []      [] [] [] [] [] [] [] [] [] [] []    I=Independent, Mod I=Modified Independent, S=Supervision/Setup, SBA=Standby Assistance, CGA=Contact Guard Assistance, Min=Minimal Assistance, Mod=Moderate Assistance, Max=Maximal Assistance, Total=Total Assistance, NT=Not Tested    ACTIVITIES OF DAILY LIVING: I Mod I S SBA CGA Min Mod Max Total NT Comments   BASIC ADLs:              Upper Body Bathing  [] [] [x] [] [] [] [] [] [] []    Lower Body Bathing [] [] [x] [] [] [] [] [] [] []    Toileting [] [] [x] [] [] [] [] [] [] []    Upper Body Dressing [] [] [x] [] [] [] [] [] [] []    Lower Body Dressing [] [] [x] [] [] [] [] [] [] []    Feeding [] [x] [] [] [] [] [] [] [] []    Grooming [] [] [x] [] [] [] [] [] [] []    Personal Device Care [] [] [] [] [] [] [] [] [] []    Functional Mobility [] [] [x] [] [] [] [] [] [] [] Tactile cues     I=Independent, Mod I=Modified Independent, S=Supervision/Setup, SBA=Standby Assistance, CGA=Contact Guard Assistance, Min=Minimal Assistance, Mod=Moderate Assistance, Max=Maximal Assistance, Total=Total Assistance, NT=Not Tested    PLAN:   FREQUENCY/DURATION   OT Plan of Care:  (1 treatment) for duration of hospital stay or until stated goals are met, whichever comes first.    PROBLEM LIST:   (Skilled intervention is medically necessary to address:)  Decreased Activity Tolerance   INTERVENTIONS PLANNED:  (Benefits and precautions of occupational therapy have been discussed with the patient.)  Self Care Training  Education         TREATMENT:     EVALUATION: LOW COMPLEXITY: (Untimed Charge)    TREATMENT:   Self Care: (40 min): Procedure(s) (per grid) utilized to improve and/or restore self-care/home management as related to dressing, toileting, grooming, and functional mobility . Required minimal verbal cues for vision loss only cueing to facilitate activities of daily living skills.       AFTER TREATMENT PRECAUTIONS: Bed/Chair Locked, Call light within reach, Chair, Needs within reach, RN notified, and Visitors at bedside    INTERDISCIPLINARY COLLABORATION:  RN/ PCT, PT/ PTA, and OT/ ARZATE    EDUCATION:       TOTAL TREATMENT DURATION AND TIME:  Time In: Senthil Zhang  Time Out: [address]  Minutes: [address], OT

## 2023-01-31 NOTE — PROGRESS NOTES
Medical Student Progress Note   Admit Date:  2023  9:13 AM   Name:  Neftali Casarez   Age:  80 y.o. Sex:  female  :  1932   MRN:  [id number]   Room:  359/01    Presenting Complaint: Dizziness    Reason(s) for Admission: GIB (gastrointestinal bleeding) [K92.2]  Transient vision disturbance [H53.9]  Gastrointestinal hemorrhage, unspecified gastrointestinal hemorrhage type [K92.2]  Anemia, unspecified type [D64.9]  GI bleed [facility] Course & Interval History:   80year old female with a pmhx of macular degeneration with legal blindness, GERD, HTN, and dysphagia presented with complaints of visual disturbance. She was subsequently found to have a Hgb of 5.5 with a GI bleed. Pt received 1 unit PRBC and Hgb luis f to 7.5. She was having some janae blood in her stool. GI performed EGD and colonoscopy which showed only a hiatal hernia without an obvious source of the bleeding. Most recent Hgb 7.2 with MCV 80.4. Subjective (23): Pt feels well today and has no complaints. Pt denies any further episodes of blood per rectum. She also denies any further visual disturbances. She tolerated the EGD and colonoscopy yesterday well. She denies any weakness, fatigue, abdominal pain, or N/V. Assessment & Plan:   GI Bleed  Hgb 5.5 on admission, transfused 1 unit PRBCs, current Hgb 7.2.  MCV 80.4  Likely iron deficiency, no further episodes of blood per rectum  EGD and colonoscopy did not reveal any source of bleeding  Consider discharge on oral FeSO4 325 qod and PPI bid, avoid NSAIDs  Follow up with PCP and GI, defer decision to restart ASA/plavix    Anemia  As above    Vision Loss  Pt has had no further episodes of visual disturbance  CT head negative    HTN  BP relatively well controlled throughout hospital stay  Continue home BP medication regimen    Hospital Problems:  Principal Problem:    GIB (gastrointestinal bleeding)  Active Problems:    Anemia    Macular degeneration    Glaucoma    GI bleed    Hypertension    Dysphagia    Visual impairment  Resolved Problems:    Visual changes      Objective:   Patient Vitals for the past 24 hrs:   Temp Pulse Resp BP SpO2   01/31/23 0715 98.3 °F (36.8 °C) 80 18 (!) 134/54 96 %   01/31/23 0320 98.8 °F (37.1 °C) 81 18 (!) 133/59 95 %   01/30/23 2312 98.6 °F (37 °C) 80 16 (!) 129/55 95 %   01/30/23 1946 -- 81 -- -- --   01/30/23 1901 98.6 °F (37 °C) 81 18 (!) 136/45 96 %   01/30/23 1523 98.1 °F (36.7 °C) 71 15 (!) 148/50 100 %   01/30/23 1336 -- 64 14 (!) 114/53 98 %   01/30/23 1331 -- 63 14 (!) 124/58 99 %   01/30/23 1325 -- 68 14 (!) 105/52 98 %   01/30/23 1321 -- 81 14 (!) 90/49 97 %   01/30/23 1315 -- 73 14 (!) 92/48 96 %   01/30/23 1314 -- 73 14 (!) 84/44 96 %   01/30/23 1312 -- 75 -- -- --   01/30/23 1311 -- 77 14 (!) 82/47 100 %   01/30/23 1113 97.7 °F (36.5 °C) 76 18 (!) 148/84 98 %       Oxygen Therapy  SpO2: 96 %  Pulse via Oximetry: 64 beats per minute  Pulse Oximeter Device Mode: Continuous  O2 Device: None (Room air)  O2 Flow Rate (L/min): 0 L/min    Estimated body mass index is 22.86 kg/m² as calculated from the following:    Height as of this encounter: 5' 1\" (1.549 m). Weight as of this encounter: 121 lb (54.9 kg). Intake/Output Summary (Last 24 hours) at 1/31/2023 0805  Last data filed at 1/30/2023 2312  Gross per 24 hour   Intake 500 ml   Output 600 ml   Net -100 ml         Physical Exam:   Blood pressure (!) 134/54, pulse 80, temperature 98.3 °F (36.8 °C), temperature source Oral, resp. rate 18, height 5' 1\" (1.549 m), weight 121 lb (54.9 kg), SpO2 96 %. General:    Well nourished. Head:  Normocephalic, atraumatic  Eyes:  Sclerae appear normal.  Pupils equally round. CV:   RRR. No m/r/g. Lungs:   CTAB. No wheezing, rhonchi, or rales. Symmetric expansion. Abdomen:   Soft, nontender, nondistended. Extremities: No cyanosis or clubbing. No edema  Skin:     No rashes and normal coloration. Warm and dry.     Psych:  Normal mood and affect.       I have personally reviewed labs and tests showing:  Recent Labs:  Recent Results (from the past 48 hour(s))   Hemoglobin    Collection Time: 01/30/23  5:40 AM   Result Value Ref Range    Hemoglobin 7.8 (L) 11.7 - 15.4 g/dL   PLEASE READ & DOCUMENT PPD TEST IN 72 HRS    Collection Time: 01/30/23 10:44 AM   Result Value Ref Range    PPD, (POC) Negative Negative    mm Induration 0 0 - 5 mm   CBC with Auto Differential    Collection Time: 01/31/23  5:42 AM   Result Value Ref Range    WBC 5.9 4.3 - 11.1 K/uL    RBC 2.96 (L) 4.05 - 5.2 M/uL    Hemoglobin 7.2 (L) 11.7 - 15.4 g/dL    Hematocrit 23.8 (L) 35.8 - 46.3 %    MCV 80.4 (L) 82.0 - 102.0 FL    MCH 24.3 (L) 26.1 - 32.9 PG    MCHC 30.3 (L) 31.4 - 35.0 g/dL    RDW 16.9 (H) 11.9 - 14.6 %    Platelets 079 281 - 165 K/uL    MPV 10.0 9.4 - 12.3 FL    nRBC 0.07 0.0 - 0.2 K/uL    Differential Type AUTOMATED      Seg Neutrophils 52 43 - 78 %    Lymphocytes 31 13 - 44 %    Monocytes 12 4.0 - 12.0 %    Eosinophils % 3 0.5 - 7.8 %    Basophils 0 0.0 - 2.0 %    Immature Granulocytes 1 0.0 - 5.0 %    Segs Absolute 3.1 1.7 - 8.2 K/UL    Absolute Lymph # 1.9 0.5 - 4.6 K/UL    Absolute Mono # 0.7 0.1 - 1.3 K/UL    Absolute Eos # 0.2 0.0 - 0.8 K/UL    Basophils Absolute 0.0 0.0 - 0.2 K/UL    Absolute Immature Granulocyte 0.1 0.0 - 0.5 K/UL   Comprehensive Metabolic Panel    Collection Time: 01/31/23  5:42 AM   Result Value Ref Range    Sodium 141 133 - 143 mmol/L    Potassium 3.9 3.5 - 5.1 mmol/L    Chloride 111 (H) 101 - 110 mmol/L    CO2 25 21 - 32 mmol/L    Anion Gap 5 2 - 11 mmol/L    Glucose 158 (H) 65 - 100 mg/dL    BUN 9 8 - 23 MG/DL    Creatinine 0.65 0.6 - 1.0 MG/DL    Est, Glom Filt Rate >60 >60 ml/min/1.73m2    Calcium 8.5 8.3 - 10.4 MG/DL    Total Bilirubin 0.2 0.2 - 1.1 MG/DL    ALT 18 12 - 65 U/L    AST 16 15 - 37 U/L    Alk Phosphatase 78 50 - 130 U/L    Total Protein 5.3 (L) 6.3 - 8.2 g/dL    Albumin 2.6 (L) 3.2 - 4.6 g/dL    Globulin 2.7 (L) 2.8 - 4.5 g/dL    Albumin/Globulin Ratio 1.0 0.4 - 1.6     Magnesium    Collection Time: 01/31/23  5:42 AM   Result Value Ref Range    Magnesium 2.1 1.8 - 2.4 mg/dL       I have personally reviewed imaging studies showing: Other Studies:  CT HEAD WO CONTRAST   Final Result      1. No acute intracranial abnormality. 2. Chronic small vessel ischemic changes. 3. Chronic maxillary sinus disease. XR CHEST (2 VW)   Final Result   No acute airspace disease. Current Meds:  Current Facility-Administered Medications   Medication Dose Route Frequency    pantoprazole (PROTONIX) tablet 40 mg  40 mg Oral QAM AC    cyanocobalamin injection 1,000 mcg  1,000 mcg IntraMUSCular Daily    amLODIPine (NORVASC) tablet 5 mg  5 mg Oral Daily    cloNIDine (CATAPRES) tablet 0.2 mg  0.2 mg Oral BID PRN    sodium chloride flush 0.9 % injection 5-40 mL  5-40 mL IntraVENous 2 times per day    sodium chloride flush 0.9 % injection 5-40 mL  5-40 mL IntraVENous PRN    0.9 % sodium chloride infusion   IntraVENous PRN    ondansetron (ZOFRAN-ODT) disintegrating tablet 4 mg  4 mg Oral Q8H PRN    Or    ondansetron (ZOFRAN) injection 4 mg  4 mg IntraVENous Q6H PRN    acetaminophen (TYLENOL) tablet 650 mg  650 mg Oral Q6H PRN    Or    acetaminophen (TYLENOL) suppository 650 mg  650 mg Rectal Q6H PRN    0.9 % sodium chloride bolus  100 mL IntraVENous ONCE PRN    iopamidol (ISOVUE-370) 76 % injection 50 mL  50 mL IntraVENous ONCE PRN    sodium chloride flush 0.9 % injection 10 mL  10 mL IntraVENous ONCE PRN       Signed:  Hermilama Carole  [facility]    Part of this note may have been written by using a voice dictation software. The note has been proof read but may still contain some grammatical/other typographical errors.

## 2023-01-31 NOTE — PROGRESS NOTES
Therapy Note:    New PT and OT orders received today. PT has evaluated Ms. Nobles on 1/27/23 and 1/28/23 and found no further PT needs. OT evaluated her on 1/27/23 and today and found with no further OT needs.  Thank you,    Joe Orozco, OT    Rehab Caseload Tracker

## 2023-01-31 NOTE — CARE COORDINATION
Discharge planning was discussed with the Interdisciplinary Team. The patient is a potential discharge today and may benefit from home health RN/SW/aide. The home health order was confirmed with read back.

## 2023-01-31 NOTE — PROGRESS NOTES
Hospitalist Progress Note   Admit Date:  2023  9:13 AM   Name:  Michael Deutsch   Age:  [address] Sex:  female  :  1932   MRN:  [id number]   Room:  [facility]/    Presenting Complaint: Dizziness     Reason(s) for Admission: GIB (gastrointestinal bleeding) [K92.2]  Transient vision disturbance [H53.9]  Gastrointestinal hemorrhage, unspecified gastrointestinal hemorrhage type [K92.2]  Anemia, unspecified type [D64.9]  GI bleed [K92.2]     Hospital Course:   Copied from prior provider HPI:    [address]. female with medical history of macular degeneration causing legal blindness, HTN, GERD, dysphagia who presented with concerns of decrease visual changes however found to have Hg of 5.5 and a GIB. She has since had PRBC infusions with some continued janae blood in her stool. Subjective & 24hr Events (23): Hemoglobin fairly stable 7.2. PT and OT eval recommended home. Patient is [address] old, legally blind, hemoglobin only 7.2. Not clear that she will be able to cook and clean for herself-discussed with son who had concerns and he reports that patient had episode of significant confusion  which was witnessed and documented by nurse Justus Colon. Discussed with son consideration of alternate living situation for his mother and did discuss could appeal decision for discharge. For now son request reevaluation by physical and Occupational Therapy in particular occupational therapy has he does not feel that she will be able to care for herself. Case management documented conversation about arrangements for home sitters for patient. We will follow hemoglobin hematocrit. [place] ulcers documented prior note incorrect-dictation stated without Reji ulcers however with GI bleeding we will continue to hold antiplatelets and if hemoglobin 7 or below consider packed red blood cell transfusion prior to discharge to setting yet to be determined.   Son did mention he could be able to come back from [place] to stay with his mother temporarily until alternate situation arranged if given enough time. Patient has no new complaints. Chelsea Najera is not sure that she would not be able to care for herself but definitely appears motivated and willing. She remains at present time alert and oriented x3 did not assess recall. Assessment & Plan:      -- GIB (gastrointestinal bleeding), lower per hx, on ASA and plavix at home. S/p PRBC 1 unit transfusion. Irona dn B12 deficient. January 31--no documented clear source. Continue once daily oral PPI and avoid antiplatelets for now. Hemoglobin just over 7, age 80, legally blind. Son request reevaluation by occupational therapy. - Hx of  Macular degeneration, Glaucoma, dysphagia, HTN. Stable. 1/30-no changes. Lab/Data Review: All lab results for the last 24 hours reviewed.                   Assessment & Plan:     As above  Hospital Problems:  Principal Problem:    GIB (gastrointestinal bleeding)  Active Problems:    Anemia    Macular degeneration    Glaucoma    GI bleed    Hypertension    Dysphagia    Visual impairment  Resolved Problems:    Visual changes      Objective:   Patient Vitals for the past 24 hrs:   Temp Pulse Resp BP SpO2   01/31/23 1100 98.3 °F (36.8 °C) 80 16 (!) 124/53 98 %   01/31/23 0715 98.3 °F (36.8 °C) 80 18 (!) 134/54 96 %   01/31/23 0320 98.8 °F (37.1 °C) 81 18 (!) 133/59 95 %   01/30/23 2312 98.6 °F (37 °C) 80 16 (!) 129/55 95 %   01/30/23 1946 -- 81 -- -- --   01/30/23 1901 98.6 °F (37 °C) 81 18 (!) 136/45 96 %   01/30/23 1523 98.1 °F (36.7 °C) 71 15 (!) 148/50 100 %   01/30/23 1336 -- 64 14 (!) 114/53 98 %   01/30/23 1331 -- 63 14 (!) 124/58 99 %   01/30/23 1325 -- 68 14 (!) 105/52 98 %   01/30/23 1321 -- 81 14 (!) 90/49 97 %   01/30/23 1315 -- 73 14 (!) 92/48 96 %   01/30/23 1314 -- 73 14 (!) 84/44 96 %   01/30/23 1312 -- 75 -- -- --   01/30/23 1311 -- 77 14 (!) 82/47 100 %         Oxygen Therapy  SpO2: 98 %  Pulse via Oximetry: 64 beats per minute  Pulse Oximeter Device Mode: Continuous  O2 Device: None (Room air)  Skin Assessment: Clean, dry, & intact  O2 Flow Rate (L/min): 0 L/min    Estimated body mass index is 22.86 kg/m² as calculated from the following:    Height as of this encounter: 5' 1\" (1.549 m). Weight as of this encounter: 121 lb (54.9 kg). Intake/Output Summary (Last 24 hours) at 1/31/2023 1236  Last data filed at 1/30/2023 2312  Gross per 24 hour   Intake 500 ml   Output 600 ml   Net -100 ml           Physical Exam:     Blood pressure (!) 124/53, pulse 80, temperature 98.3 °F (36.8 °C), temperature source Oral, resp. rate 16, height 5' 1\" (1.549 m), weight 121 lb (54.9 kg), SpO2 98 %. Physical Exam:   General:                      Alert, cooperative, no distress, appears stated age. Eyes:                                           Conjunctivae/corneas clear. Pupils equally round and reactive to light, extraocular movements intact. Lungs:                       Clear to auscultation bilaterally. No wheezing. Heart:                     Regular rate and rhythm, S1, S2 normal, no murmur, click, rub or gallop. Abdomen:                       Soft, non-tender. Bowel sounds normal. No masses,  No organomegaly. Extremities:                     Extremities normal, atraumatic, no cyanosis or unilateral lower extremity edema  Neurologic:                     CNII-XII intact. Normal strength, sensation and reflexes throughout.     I have personally reviewed labs and tests:  Recent Labs:  Recent Results (from the past 48 hour(s))   Hemoglobin    Collection Time: 01/30/23  5:40 AM   Result Value Ref Range    Hemoglobin 7.8 (L) 11.7 - 15.4 g/dL   PLEASE READ & DOCUMENT PPD TEST IN 72 HRS    Collection Time: 01/30/23 10:44 AM   Result Value Ref Range    PPD, (POC) Negative Negative    mm Induration 0 0 - 5 mm   CBC with Auto Differential    Collection Time: 01/31/23  5:42 AM   Result Value Ref Range    WBC 5.9 4.3 - 11.1 K/uL    RBC 2.96 (L) 4.05 - 5.2 M/uL    Hemoglobin 7.2 (L) 11.7 - 15.4 g/dL    Hematocrit 23.8 (L) 35.8 - 46.3 %    MCV 80.4 (L) 82.0 - 102.0 FL    MCH 24.3 (L) 26.1 - 32.9 PG    MCHC 30.3 (L) 31.4 - 35.0 g/dL    RDW 16.9 (H) 11.9 - 14.6 %    Platelets 299 150 - 450 K/uL    MPV 10.0 9.4 - 12.3 FL    nRBC 0.07 0.0 - 0.2 K/uL    Differential Type AUTOMATED      Seg Neutrophils 52 43 - 78 %    Lymphocytes 31 13 - 44 %    Monocytes 12 4.0 - 12.0 %    Eosinophils % 3 0.5 - 7.8 %    Basophils 0 0.0 - 2.0 %    Immature Granulocytes 1 0.0 - 5.0 %    Segs Absolute 3.1 1.7 - 8.2 K/UL    Absolute Lymph # 1.9 0.5 - 4.6 K/UL    Absolute Mono # 0.7 0.1 - 1.3 K/UL    Absolute Eos # 0.2 0.0 - 0.8 K/UL    Basophils Absolute 0.0 0.0 - 0.2 K/UL    Absolute Immature Granulocyte 0.1 0.0 - 0.5 K/UL   Comprehensive Metabolic Panel    Collection Time: 01/31/23  5:42 AM   Result Value Ref Range    Sodium 141 133 - 143 mmol/L    Potassium 3.9 3.5 - 5.1 mmol/L    Chloride 111 (H) 101 - 110 mmol/L    CO2 25 21 - 32 mmol/L    Anion Gap 5 2 - 11 mmol/L    Glucose 158 (H) 65 - 100 mg/dL    BUN 9 8 - 23 MG/DL    Creatinine 0.65 0.6 - 1.0 MG/DL    Est, Glom Filt Rate >60 >60 ml/min/1.73m2    Calcium 8.5 8.3 - 10.4 MG/DL    Total Bilirubin 0.2 0.2 - 1.1 MG/DL    ALT 18 12 - 65 U/L    AST 16 15 - 37 U/L    Alk Phosphatase 78 50 - 130 U/L    Total Protein 5.3 (L) 6.3 - 8.2 g/dL    Albumin 2.6 (L) 3.2 - 4.6 g/dL    Globulin 2.7 (L) 2.8 - 4.5 g/dL    Albumin/Globulin Ratio 1.0 0.4 - 1.6     Magnesium    Collection Time: 01/31/23  5:42 AM   Result Value Ref Range    Magnesium 2.1 1.8 - 2.4 mg/dL       I have personally reviewed imaging studies:  Other Studies:  CT HEAD WO CONTRAST   Final Result      1. No acute intracranial abnormality.   2. Chronic small vessel ischemic changes.    3. Chronic maxillary sinus disease.            XR CHEST (2 VW)   Final Result   No acute airspace disease.             Current Meds:  Current Facility-Administered  Medications   Medication Dose Route Frequency    pantoprazole (PROTONIX) tablet 40 mg  40 mg Oral QAM AC    amLODIPine (NORVASC) tablet 5 mg  5 mg Oral Daily    cloNIDine (CATAPRES) tablet 0.2 mg  0.2 mg Oral BID PRN    sodium chloride flush 0.9 % injection 5-40 mL  5-40 mL IntraVENous 2 times per day    sodium chloride flush 0.9 % injection 5-40 mL  5-40 mL IntraVENous PRN    0.9 % sodium chloride infusion   IntraVENous PRN    ondansetron (ZOFRAN-ODT) disintegrating tablet 4 mg  4 mg Oral Q8H PRN    Or    ondansetron (ZOFRAN) injection 4 mg  4 mg IntraVENous Q6H PRN    acetaminophen (TYLENOL) tablet 650 mg  650 mg Oral Q6H PRN    Or    acetaminophen (TYLENOL) suppository 650 mg  650 mg Rectal Q6H PRN    0.9 % sodium chloride bolus  100 mL IntraVENous ONCE PRN    iopamidol (ISOVUE-370) 76 % injection 50 mL  50 mL IntraVENous ONCE PRN    sodium chloride flush 0.9 % injection 10 mL  10 mL IntraVENous ONCE PRN       Signed:  Thai Vincent DO    Part of this note may have been written by using a voice dictation software. The note has been proof read but may still contain some grammatical/other typographical errors.

## 2023-01-31 NOTE — PROGRESS NOTES
Physician Progress Note      Eliel MCGILL #:                  [id number]  :                       1932  ADMIT DATE:       2023 9:13 AM  DISCH DATE:  RESPONDING  PROVIDER #:        Sima Yancey DO          QUERY TEXT:    Pt admitted with GI bleed and has anemia documented. If possible, please   document in progress notes and discharge summary further specificity regarding   the acuity and type of anemia:    The medical record reflects the following:  Risk Factors: 89 yr, GERD, HTn,  Clinical Indicators: hgb 5.5 on admit increased to 7.5, Iron 7, per Gi   documentation on colonoscopy report  IV iron while inpatient, PO on discharge  Treatment: transfuse PRBC, Gi consult, EGD, colonoscopy, lab monitoring,   cyancocobalamin injections  Options provided:  -- Anemia due to acute blood loss  -- Anemia due to iron deficiency  -- Other - I will add my own diagnosis  -- Disagree - Not applicable / Not valid  -- Disagree - Clinically unable to determine / Unknown  -- Refer to Clinical Documentation Reviewer    PROVIDER RESPONSE TEXT:    This patient has acute blood loss anemia.     Query created by: Marlon Ojeda on 2023 2:47 PM      Electronically signed by:  Sima Yancey DO 2023 4:53 PM

## 2023-01-31 NOTE — CARE COORDINATION
The patient ambulated to the case management office with the occupational therapist. She confirmed she wants to return home and has strong community support. She confirmed she is in agreement to return home with home health services.

## 2023-02-01 VITALS
HEART RATE: 93 BPM | SYSTOLIC BLOOD PRESSURE: 111 MMHG | BODY MASS INDEX: 22.84 KG/M2 | TEMPERATURE: 98.2 F | HEIGHT: 61 IN | WEIGHT: 121 LBS | OXYGEN SATURATION: 99 % | DIASTOLIC BLOOD PRESSURE: 55 MMHG | RESPIRATION RATE: 16 BRPM

## 2023-02-01 LAB
ABO + RH BLD: NORMAL
ANTIGENS PRESENT BLD: NORMAL
ANTIGENS PRESENT RBC DONR: NORMAL
ANTIGENS PRESENT RBC DONR: NORMAL
BASOPHILS # BLD: 0 K/UL (ref 0–0.2)
BASOPHILS NFR BLD: 0 % (ref 0–2)
BLD PROD TYP BPU: NORMAL
BLD PROD TYP BPU: NORMAL
BLOOD BANK DISPENSE STATUS: NORMAL
BLOOD BANK DISPENSE STATUS: NORMAL
BLOOD GROUP ANTIBODIES SERPL: NORMAL
BLOOD GROUP ANTIBODIES SERPL: NORMAL
BPU ID: NORMAL
BPU ID: NORMAL
CROSSMATCH RESULT: NORMAL
CROSSMATCH RESULT: NORMAL
DIFFERENTIAL METHOD BLD: ABNORMAL
EOSINOPHIL # BLD: 0.2 K/UL (ref 0–0.8)
EOSINOPHIL NFR BLD: 3 % (ref 0.5–7.8)
ERYTHROCYTE [DISTWIDTH] IN BLOOD BY AUTOMATED COUNT: 18.6 % (ref 11.9–14.6)
HCT VFR BLD AUTO: 24.9 % (ref 35.8–46.3)
HCT VFR BLD AUTO: 25.8 % (ref 35.8–46.3)
HGB BLD-MCNC: 7.6 G/DL (ref 11.7–15.4)
HGB BLD-MCNC: 7.8 G/DL (ref 11.7–15.4)
IMM GRANULOCYTES # BLD AUTO: 0.1 K/UL (ref 0–0.5)
IMM GRANULOCYTES NFR BLD AUTO: 1 % (ref 0–5)
LYMPHOCYTES # BLD: 2.2 K/UL (ref 0.5–4.6)
LYMPHOCYTES NFR BLD: 30 % (ref 13–44)
MCH RBC QN AUTO: 24.5 PG (ref 26.1–32.9)
MCHC RBC AUTO-ENTMCNC: 30.2 G/DL (ref 31.4–35)
MCV RBC AUTO: 81.1 FL (ref 82–102)
MONOCYTES # BLD: 0.8 K/UL (ref 0.1–1.3)
MONOCYTES NFR BLD: 11 % (ref 4–12)
NEUTS SEG # BLD: 4 K/UL (ref 1.7–8.2)
NEUTS SEG NFR BLD: 54 % (ref 43–78)
NRBC # BLD: 0.04 K/UL (ref 0–0.2)
PLATELET # BLD AUTO: 304 K/UL (ref 150–450)
PMV BLD AUTO: 10.1 FL (ref 9.4–12.3)
RBC # BLD AUTO: 3.18 M/UL (ref 4.05–5.2)
SPECIMEN EXP DATE BLD: NORMAL
UNIT DIVISION: 0
UNIT DIVISION: 0
WBC # BLD AUTO: 7.3 K/UL (ref 4.3–11.1)
WEAK D AG RBC QL: NORMAL

## 2023-02-01 PROCEDURE — 85025 COMPLETE CBC W/AUTO DIFF WBC: CPT

## 2023-02-01 PROCEDURE — 6370000000 HC RX 637 (ALT 250 FOR IP): Performed by: STUDENT IN AN ORGANIZED HEALTH CARE EDUCATION/TRAINING PROGRAM

## 2023-02-01 PROCEDURE — 36415 COLL VENOUS BLD VENIPUNCTURE: CPT

## 2023-02-01 PROCEDURE — 2580000003 HC RX 258: Performed by: NURSE PRACTITIONER

## 2023-02-01 PROCEDURE — 6370000000 HC RX 637 (ALT 250 FOR IP): Performed by: HOSPITALIST

## 2023-02-01 PROCEDURE — 85014 HEMATOCRIT: CPT

## 2023-02-01 RX ORDER — FERROUS SULFATE 325(65) MG
325 TABLET ORAL 2 TIMES DAILY
Qty: 60 TABLET | Refills: 0 | Status: SHIPPED | OUTPATIENT
Start: 2023-02-01 | End: 2023-03-03

## 2023-02-01 RX ORDER — DIMETHICONE, CAMPHOR (SYNTHETIC), MENTHOL, AND PHENOL 1.1; .5; .625; .5 G/100G; G/100G; G/100G; G/100G
OINTMENT TOPICAL PRN
Status: DISCONTINUED | OUTPATIENT
Start: 2023-02-01 | End: 2023-02-01 | Stop reason: HOSPADM

## 2023-02-01 RX ORDER — LANOLIN ALCOHOL/MO/W.PET/CERES
1000 CREAM (GRAM) TOPICAL DAILY
Qty: 30 TABLET | Refills: 1 | Status: SHIPPED | OUTPATIENT
Start: 2023-02-01 | End: 2023-03-03

## 2023-02-01 RX ORDER — PANTOPRAZOLE SODIUM 40 MG/1
40 TABLET, DELAYED RELEASE ORAL
Qty: 30 TABLET | Refills: 1 | Status: SHIPPED | OUTPATIENT
Start: 2023-02-02 | End: 2023-03-04

## 2023-02-01 RX ADMIN — PANTOPRAZOLE SODIUM 40 MG: 40 TABLET, DELAYED RELEASE ORAL at 05:22

## 2023-02-01 RX ADMIN — Medication: at 11:13

## 2023-02-01 RX ADMIN — SODIUM CHLORIDE, PRESERVATIVE FREE 10 ML: 5 INJECTION INTRAVENOUS at 07:50

## 2023-02-01 RX ADMIN — AMLODIPINE BESYLATE 5 MG: 5 TABLET ORAL at 07:50

## 2023-02-01 ASSESSMENT — PAIN SCALES - GENERAL: PAINLEVEL_OUTOF10: 0

## 2023-02-01 NOTE — DISCHARGE SUMMARY
Hospitalist Discharge Summary   Admit Date:  2023  9:13 AM   DC Note date: 2023  Name:  Michael Deutsch   Age:  80 y.o. Sex:  female  :  1932   MRN:  [id number]   Room:  [facility]  PCP:  Maral Chaudhari MD    Presenting Complaint: Dizziness     Initial Admission Diagnosis: GIB (gastrointestinal bleeding) [K92.2]  Transient vision disturbance [H53.9]  Gastrointestinal hemorrhage, unspecified gastrointestinal hemorrhage type [K92.2]  Anemia, unspecified type [D64.9]  GI bleed [K92.2]     Problem List for this Hospitalization (present on admission):    Principal Problem:    GIB (gastrointestinal bleeding)  Active Problems:    Anemia    Macular degeneration    Glaucoma    GI bleed    Hypertension    Dysphagia    Visual impairment  Resolved Problems:    Visual changes      Hospital Course: This is a 25-year-old female with past medical history significant for macular degeneration causing legal blindness, hypertension, GERD, dysphagia presented to the ER with concerns for decreased vision changes. She was found to have hemoglobin of 5.5 and GI bleed. She had received blood transfusion. GI was consulted. Patient had EGD and colonoscopy. EGD shows hiatal hernia with no evidence of active bleeding. H. pylori testing sent. Colonoscopy was negative. GI recommends resuming diet. IV Protonix switch to p.o. Protonix. She was started on oral iron. She also has B12 deficiency for which she was started on vitamin B12 supplementation. Aspirin was resumed. Patient was advised to restart Plavix if hemoglobin stable and no more active bleeding and cleared by PCP. She was alert by PT/OT. Recommendations for discharge home. This was discussed with patient's son over the phone. Patient is being discharged home today in a stable condition. Disposition: Home  Diet: ADULT DIET;  Regular; Low Fat/Low Chol/High Fiber/JUAN; No red dye  Code Status: Full Code    Follow Ups:   Follow-up Information     [facility] [address] [facility] Follow up. Specialty: Home Health Services  Contact information:  [address] [facility] [address]  [address]   Shyanne Brown MD. Schedule an appointment as soon as possible for a   visit in 2 week(s). Specialty: Gastroenterology  Contact information:  [address] [facility] [address]  [phone]                       Time spent in patient discharge and coordination 39 minutes. Follow up labs/diagnostics (ultimately defer to outpatient provider):  CBC, BMP in 3-5 days. Plan was discussed with the pt. All questions answered. Patient was stable at time of discharge. Instructions given to call a physician or return if any concerns.     Current Discharge Medication List        START taking these medications    Details   pantoprazole (PROTONIX) 40 MG tablet Take 1 tablet by mouth every morning (before breakfast)  Qty: 30 tablet, Refills: 1      ferrous sulfate (IRON 325) 325 (65 Fe) MG tablet Take 1 tablet by mouth 2 times daily  Qty: 60 tablet, Refills: 0      vitamin B-12 (CYANOCOBALAMIN) 1000 MCG tablet Take 1 tablet by mouth daily  Qty: 30 tablet, Refills: 1           CONTINUE these medications which have NOT CHANGED    Details   zoledronic acid (RECLAST) 5 MG/100ML SOLN Infuse 100 mLs intravenously once for 1 dose  Qty: 100 mL, Refills: 0    Associated Diagnoses: Osteopenia of hip, unspecified laterality      clopidogrel (PLAVIX) 75 MG tablet Take 1 tablet by mouth daily  Qty: 90 tablet, Refills: 3    Associated Diagnoses: Visual disturbance, subjective; Peripheral vascular disease (HCC)      aspirin 81 MG EC tablet Take 81 mg by mouth daily      vitamin D 25 MCG (1000 UT) CAPS Take 1,000 Units by mouth daily           STOP taking these medications       furosemide (LASIX) 20 MG tablet Comments:   Reason for Stopping:               Some medications may have been reported old/obsolete and marked \"stop taking\" by the system; in reality pt was already off these meds; defer to outpatient or prescribing providers. Procedures done this admission:  Procedure(s):  COLONOSCOPY DIAGNOSTIC  EGD BIOPSY    Consults this admission:  IP CONSULT TO GI  IP CONSULT HOME HEALTH    Echocardiogram results:  No results found for this or any previous visit. Diagnostic Imaging/Tests:   XR CHEST (2 VW)    Result Date: 1/26/2023  No acute airspace disease. CT HEAD WO CONTRAST    Result Date: 1/26/2023  1. No acute intracranial abnormality. 2. Chronic small vessel ischemic changes. 3. Chronic maxillary sinus disease.         Labs: Results:       BMP, Mg, Phos Recent Labs     01/31/23  0542      K 3.9   *   CO2 25   ANIONGAP 5   BUN 9   CREATININE 0.65   LABGLOM >60   CALCIUM 8.5   GLUCOSE 158*   MG 2.1      CBC Recent Labs     01/31/23  0542 01/31/23  1301 02/01/23  0013 02/01/23  0604   WBC 5.9  --   --  7.3   RBC 2.96*  --   --  3.18*   HGB 7.2* 7.6* 7.6* 7.8*   HCT 23.8* 25.4* 24.9* 25.8*   MCV 80.4*  --   --  81.1*   MCH 24.3*  --   --  24.5*   MCHC 30.3*  --   --  30.2*   RDW 16.9*  --   --  18.6*     --   --  304   MPV 10.0  --   --  10.1   NRBC 0.07  --   --  0.04   SEGS 52  --   --  54   LYMPHOPCT 31  --   --  30   EOSRELPCT 3  --   --  3   MONOPCT 12  --   --  11   BASOPCT 0  --   --  0   IMMGRAN 1  --   --  1   SEGSABS 3.1  --   --  4.0   LYMPHSABS 1.9  --   --  2.2   EOSABS 0.2  --   --  0.2   MONOSABS 0.7  --   --  0.8   BASOSABS 0.0  --   --  0.0   ABSIMMGRAN 0.1  --   --  0.1      LFT Recent Labs     01/31/23  0542   BILITOT 0.2   ALKPHOS 78   AST 16   ALT 18   PROT 5.3*   LABALBU 2.6*   GLOB 2.7*      Cardiac  No results found for: NTPROBNP, TROPHS   Coags Lab Results   Component Value Date/Time    PROTIME 14.2 01/27/2023 05:23 AM    INR 1.1 01/27/2023 05:23 AM    APTT 22.1 01/27/2023 05:23 AM      A1c Lab Results   Component Value Date/Time    LABA1C 6.6 08/23/2022 11:02 AM LABA1C 6.5 08/13/2021 10:41 AM     08/23/2022 11:02 AM     08/13/2021 10:41 AM      Lipids No results found for: CHOL, LDLCALC, LABVLDL, HDL, CHOLHDLRATIO, TRIG   Thyroid  Lab Results   Component Value Date/Time    TSHELE 1.97 01/28/2023 04:37 AM        Most Recent UA Lab Results   Component Value Date/Time    COLORU YELLOW/STRAW 01/26/2023 03:46 PM    APPEARANCE CLEAR 01/26/2023 03:46 PM    SPECGRAV 1.014 01/26/2023 03:46 PM    LABPH 7.5 01/26/2023 03:46 PM    PROTEINU Negative 01/26/2023 03:46 PM    GLUCOSEU 100 01/26/2023 03:46 PM    KETUA 15 01/26/2023 03:46 PM    BILIRUBINUR Negative 01/26/2023 03:46 PM    BLOODU Negative 01/26/2023 03:46 PM    UROBILINOGEN 0.2 01/26/2023 03:46 PM    NITRU Negative 01/26/2023 03:46 PM    LEUKOCYTESUR Negative 01/26/2023 03:46 PM        No results for input(s): CULTURE in the last 720 hours.     All Labs from Last 24 Hrs:  Recent Results (from the past 24 hour(s))   Hemoglobin and Hematocrit    Collection Time: 01/31/23  1:01 PM   Result Value Ref Range    Hemoglobin 7.6 (L) 11.7 - 15.4 g/dL    Hematocrit 25.4 (L) 35.8 - 46.3 %   Hemoglobin and Hematocrit    Collection Time: 02/01/23 12:13 AM   Result Value Ref Range    Hemoglobin 7.6 (L) 11.7 - 15.4 g/dL    Hematocrit 24.9 (L) 35.8 - 46.3 %   CBC with Auto Differential    Collection Time: 02/01/23  6:04 AM   Result Value Ref Range    WBC 7.3 4.3 - 11.1 K/uL    RBC 3.18 (L) 4.05 - 5.2 M/uL    Hemoglobin 7.8 (L) 11.7 - 15.4 g/dL    Hematocrit 25.8 (L) 35.8 - 46.3 %    MCV 81.1 (L) 82.0 - 102.0 FL    MCH 24.5 (L) 26.1 - 32.9 PG    MCHC 30.2 (L) 31.4 - 35.0 g/dL    RDW 18.6 (H) 11.9 - 14.6 %    Platelets 085 591 - 168 K/uL    MPV 10.1 9.4 - 12.3 FL    nRBC 0.04 0.0 - 0.2 K/uL    Differential Type AUTOMATED      Seg Neutrophils 54 43 - 78 %    Lymphocytes 30 13 - 44 %    Monocytes 11 4.0 - 12.0 %    Eosinophils % 3 0.5 - 7.8 %    Basophils 0 0.0 - 2.0 %    Immature Granulocytes 1 0.0 - 5.0 %    Segs Absolute 4.0 1.7 - 8.2 K/UL    Absolute Lymph # 2.2 0.5 - 4.6 K/UL    Absolute Mono # 0.8 0.1 - 1.3 K/UL    Absolute Eos # 0.2 0.0 - 0.8 K/UL    Basophils Absolute 0.0 0.0 - 0.2 K/UL    Absolute Immature Granulocyte 0.1 0.0 - 0.5 K/UL       Allergies   Allergen Reactions    Atorvastatin Other (See Comments)     Muscle aches    Ezetimibe-Simvastatin Other (See Comments)     Hair loss    Sulfa Antibiotics Rash     Immunization History   Administered Date(s) Administered    COVID-19, MODERNA BLUE border, Primary or Immunocompromised, (age 12y+), IM, 100 mcg/0.5mL 12/31/2020, 01/28/2021    COVID-19, MODERNA Booster BLUE border, (age 18y+), IM, 50mcg/0.25mL 10/23/2021    Influenza, FLUAD, (age 72 y+), Adjuvanted, 0.5mL 09/21/2021, 10/12/2022    Influenza, High Dose (Fluzone 65 yrs and older) 10/12/2016, 09/18/2017, 10/23/2017, 09/23/2018    Influenza, Triv, inactivated, subunit, adjuvanted, IM (Fluad 65 yrs and older) 08/29/2019    PPD Test 01/27/2023    Pneumococcal Conjugate 13-valent (Aliwlan01) 07/13/2015    Pneumococcal Polysaccharide (Sviufsugc12) 01/01/2007    Td vaccine (adult) 07/26/2008    Zoster Recombinant (Shingrix) 05/10/2018       Recent Vital Data:  Patient Vitals for the past 24 hrs:   Temp Pulse Resp BP SpO2   02/01/23 0714 98.6 °F (37 °C) 78 18 115/60 96 %   02/01/23 0427 98.1 °F (36.7 °C) 77 18 133/60 96 %   01/31/23 2325 98.2 °F (36.8 °C) 82 18 (!) 156/65 90 %   01/31/23 1943 98.1 °F (36.7 °C) 77 18 (!) 133/50 100 %   01/31/23 1654 -- -- -- 139/64 --   01/31/23 1604 98.1 °F (36.7 °C) 84 16 -- 99 %       Oxygen Therapy  SpO2: 96 %  Pulse via Oximetry: 64 beats per minute  Pulse Oximeter Device Mode: Continuous  O2 Device: None (Room air)  Skin Assessment: Clean, dry, & intact  O2 Flow Rate (L/min): 0 L/min    Estimated body mass index is 22.86 kg/m² as calculated from the following:    Height as of this encounter: 5' 1\" (1.549 m). Weight as of this encounter: 121 lb (54.9 kg).     Intake/Output Summary (Last 24 hours) at 2/1/2023 1238  Last data filed at 2/1/2023 0843  Gross per 24 hour   Intake 240 ml   Output 700 ml   Net -460 ml         Physical Exam:    General:    Elderly female, Well nourished. No overt distress  Head:  Normocephalic, atraumatic  Eyes:  Pale, no jaundice, Pupils equally round. HENT:  Nares appear normal, no drainage. Moist mucous membranes  Neck:  No restricted ROM. Trachea midline  CV:   RRR. No m/r/g. No JVD  Lungs:   CTAB. No wheezing, rhonchi, or rales. Respirations even, unlabored  Abdomen:   Soft, nontender, nondistended. Extremities: Warm and dry. No cyanosis or clubbing. No edema. Skin:     No rashes. Normal coloration  Neuro:  CN II-XII grossly intact. Psych:  Normal mood and affect. Signed:  Nyla Yeager MD    Part of this note may have been written by using a voice dictation software. The note has been proof read but may still contain some grammatical/other typographical errors.

## 2023-02-01 NOTE — CARE COORDINATION
02/01/23 1220   Service Assessment   Patient Orientation Alert and Oriented   Cognition Alert   History Provided By Patient   Primary [address]  Family Members;Friends/Neighbors   Patient's Healthcare Decision Maker is: Named in [facility]   PCP Verified by CM Yes   Prior Functional Level Independent in ADLs/IADLs   Current Functional Level Independent in ADLs/IADLs   Can patient return to prior living arrangement Yes   Ability to make needs known: Good   Family able to assist with home care needs: Yes   Would you like for me to discuss the discharge plan with any other family members/significant others, and if so, who? No   Financial Resources Medicare   Community Resources None   Social/Functional History   Lives With Alone   Type of [address] One level   [address] [facility] None   ADL Assistance Independent   Discharge Planning   Type of [facility]   Current Services Prior To Admission None   Potential Assistance Needed N/A   DME Ordered? No   Potential Assistance Purchasing Medications No   Type of Home Care Services None   Patient expects to be discharged to: [facility] Discharge   Transition of Care Consult (CM Consult) N/A   Services At/After Discharge None     [facility] She verbalized understanding and agreement with the discharge plan. No other case management needs were identified.

## 2023-02-01 NOTE — PROGRESS NOTES
Medical Student Progress Note   Admit Date:  2023  9:13 AM   Name:  Veronica Garza   Age:  80 y.o. Sex:  female  :  1932   MRN:  [id number]   Room:  359/01    Presenting Complaint: Dizziness    Reason(s) for Admission: GIB (gastrointestinal bleeding) [K92.2]  Transient vision disturbance [H53.9]  Gastrointestinal hemorrhage, unspecified gastrointestinal hemorrhage type [K92.2]  Anemia, unspecified type [D64.9]  GI bleed [facility] Course & Interval History:   80year old female with a pmhx of macular degeneration causing legal blindness, GERD, HTN, and dysphagia presented with complaints of visual disturbance. She was subsequently found to have a Hgb of 5.5 and was transfused with 1 unit PRBCs. Hgb stabilized to 7.5. She was having some janae blood in her stool. EGD and colonoscopy revealed only a 5cm hiatal hernia without any obvious source of the bleeding. Pt has had no further episodes of bloody stool. Her hgb remains stable at 7.8 today. Pt was previously cleared for discharge, but the son reached out and expressed concern for his mother, stating she was confused on  and he does not believe she will be able to adequately take care of herself at home. Prior to admission, pt was living at home alone. Patient's son wants her to be discharged to an skilled nursing facility. There is currently a referral pending for a facility. Subjective (23): Patient is doing well today and has no complaints. She is alert and oriented. She denies any additional episodes of blood in her stool. She has no abdominal pain or N/V. Pt expressed interest in being discharged to rehab or a skilled nursing facility for a brief time to get her strength back, but does state that after a short time she would prefer to go back home where she has many friends, with whom she spends time together every day.      23 1100 - After further discussion with the patient, son, and , the patient is going to be discharged home. She has been cleared by PT/OT and would prefer to return home. Assessment & Plan:   GI Bleed  Initial Hgb 5.5, transfused 1 unit PRBCs, Hgb then 7.5  Hgb today stable at 7.8, pt has not required any further transfusions  No further blood in the stool  EGD and colonoscopy did not reveal any obvious source of bleeding  Defer restarting anticoagulation to PCP/GI on an outpatient basis    Anemia  Hgb stable at 7.8  Discharge pt on FeSO4 325 PO QOD  Follow up with PCP and GI for further evaluation    Blindness  Pt has had no worsening of her vision during her stay  Head CT was negative for any acute intracranial abnormalities    HTN  BP has been well controlled during her stay  Continue Amlodipine 5mg PO QD    Hospital Problems:  Principal Problem:    GIB (gastrointestinal bleeding)  Active Problems:    Anemia    Macular degeneration    Glaucoma    GI bleed    Hypertension    Dysphagia    Visual impairment  Resolved Problems:    Visual changes      Objective:   Patient Vitals for the past 24 hrs:   Temp Pulse Resp BP SpO2   02/01/23 0714 98.6 °F (37 °C) 78 18 115/60 96 %   02/01/23 0427 98.1 °F (36.7 °C) 77 18 133/60 96 %   01/31/23 2325 98.2 °F (36.8 °C) 82 18 (!) 156/65 90 %   01/31/23 1943 98.1 °F (36.7 °C) 77 18 (!) 133/50 100 %   01/31/23 1654 -- -- -- 139/64 --   01/31/23 1604 98.1 °F (36.7 °C) 84 16 -- 99 %   01/31/23 1100 98.3 °F (36.8 °C) 80 16 (!) 124/53 98 %       Oxygen Therapy  SpO2: 96 %  Pulse via Oximetry: 64 beats per minute  Pulse Oximeter Device Mode: Continuous  O2 Device: None (Room air)  Skin Assessment: Clean, dry, & intact  O2 Flow Rate (L/min): 0 L/min    Estimated body mass index is 22.86 kg/m² as calculated from the following:    Height as of this encounter: 5' 1\" (1.549 m). Weight as of this encounter: 121 lb (54.9 kg).     Intake/Output Summary (Last 24 hours) at 2/1/2023 0883  Last data filed at 2/1/2023 0576  Gross per 24 hour   Intake --   Output 700 ml   Net -700 ml         Physical Exam:   Blood pressure 115/60, pulse 78, temperature 98.6 °F (37 °C), temperature source Oral, resp. rate 18, height 5' 1\" (1.549 m), weight 121 lb (54.9 kg), SpO2 96 %.  General:    Pleasant, alert, oriented, in no acute distress   Head:  Normocephalic, atraumatic  Eyes:  Sclerae appear normal.  Pupils equally round. Pt is legally blind.   CV:   RRR.  No m/r/g.    Lungs:   CTAB.  No wheezing, rhonchi, or rales.  Symmetric expansion.  Abdomen:   Soft, nontender, nondistended.  Extremities: No cyanosis or clubbing.  No edema  Skin:     No rashes and normal coloration.   Warm and dry.    Psych:  Normal mood and affect.      I have personally reviewed labs and tests showing:  Recent Labs:  Recent Results (from the past 48 hour(s))   PLEASE READ & DOCUMENT PPD TEST IN 72 HRS    Collection Time: 01/30/23 10:44 AM   Result Value Ref Range    PPD, (POC) Negative Negative    mm Induration 0 0 - 5 mm   CBC with Auto Differential    Collection Time: 01/31/23  5:42 AM   Result Value Ref Range    WBC 5.9 4.3 - 11.1 K/uL    RBC 2.96 (L) 4.05 - 5.2 M/uL    Hemoglobin 7.2 (L) 11.7 - 15.4 g/dL    Hematocrit 23.8 (L) 35.8 - 46.3 %    MCV 80.4 (L) 82.0 - 102.0 FL    MCH 24.3 (L) 26.1 - 32.9 PG    MCHC 30.3 (L) 31.4 - 35.0 g/dL    RDW 16.9 (H) 11.9 - 14.6 %    Platelets 299 150 - 450 K/uL    MPV 10.0 9.4 - 12.3 FL    nRBC 0.07 0.0 - 0.2 K/uL    Differential Type AUTOMATED      Seg Neutrophils 52 43 - 78 %    Lymphocytes 31 13 - 44 %    Monocytes 12 4.0 - 12.0 %    Eosinophils % 3 0.5 - 7.8 %    Basophils 0 0.0 - 2.0 %    Immature Granulocytes 1 0.0 - 5.0 %    Segs Absolute 3.1 1.7 - 8.2 K/UL    Absolute Lymph # 1.9 0.5 - 4.6 K/UL    Absolute Mono # 0.7 0.1 - 1.3 K/UL    Absolute Eos # 0.2 0.0 - 0.8 K/UL    Basophils Absolute 0.0 0.0 - 0.2 K/UL    Absolute Immature Granulocyte 0.1 0.0 - 0.5 K/UL   Comprehensive Metabolic Panel    Collection Time: 01/31/23  5:42 AM   Result Value Ref Range    Sodium 141 133 -  143 mmol/L    Potassium 3.9 3.5 - 5.1 mmol/L    Chloride 111 (H) 101 - 110 mmol/L    CO2 25 21 - 32 mmol/L    Anion Gap 5 2 - 11 mmol/L    Glucose 158 (H) 65 - 100 mg/dL    BUN 9 8 - 23 MG/DL    Creatinine 0.65 0.6 - 1.0 MG/DL    Est, Glom Filt Rate >60 >60 ml/min/1.73m2    Calcium 8.5 8.3 - 10.4 MG/DL    Total Bilirubin 0.2 0.2 - 1.1 MG/DL    ALT 18 12 - 65 U/L    AST 16 15 - 37 U/L    Alk Phosphatase 78 50 - 130 U/L    Total Protein 5.3 (L) 6.3 - 8.2 g/dL    Albumin 2.6 (L) 3.2 - 4.6 g/dL    Globulin 2.7 (L) 2.8 - 4.5 g/dL    Albumin/Globulin Ratio 1.0 0.4 - 1.6     Magnesium    Collection Time: 01/31/23  5:42 AM   Result Value Ref Range    Magnesium 2.1 1.8 - 2.4 mg/dL   Hemoglobin and Hematocrit    Collection Time: 01/31/23  1:01 PM   Result Value Ref Range    Hemoglobin 7.6 (L) 11.7 - 15.4 g/dL    Hematocrit 25.4 (L) 35.8 - 46.3 %   Hemoglobin and Hematocrit    Collection Time: 02/01/23 12:13 AM   Result Value Ref Range    Hemoglobin 7.6 (L) 11.7 - 15.4 g/dL    Hematocrit 24.9 (L) 35.8 - 46.3 %   CBC with Auto Differential    Collection Time: 02/01/23  6:04 AM   Result Value Ref Range    WBC 7.3 4.3 - 11.1 K/uL    RBC 3.18 (L) 4.05 - 5.2 M/uL    Hemoglobin 7.8 (L) 11.7 - 15.4 g/dL    Hematocrit 25.8 (L) 35.8 - 46.3 %    MCV 81.1 (L) 82.0 - 102.0 FL    MCH 24.5 (L) 26.1 - 32.9 PG    MCHC 30.2 (L) 31.4 - 35.0 g/dL    RDW 18.6 (H) 11.9 - 14.6 %    Platelets 748 229 - 312 K/uL    MPV 10.1 9.4 - 12.3 FL    nRBC 0.04 0.0 - 0.2 K/uL    Differential Type AUTOMATED      Seg Neutrophils 54 43 - 78 %    Lymphocytes 30 13 - 44 %    Monocytes 11 4.0 - 12.0 %    Eosinophils % 3 0.5 - 7.8 %    Basophils 0 0.0 - 2.0 %    Immature Granulocytes 1 0.0 - 5.0 %    Segs Absolute 4.0 1.7 - 8.2 K/UL    Absolute Lymph # 2.2 0.5 - 4.6 K/UL    Absolute Mono # 0.8 0.1 - 1.3 K/UL    Absolute Eos # 0.2 0.0 - 0.8 K/UL    Basophils Absolute 0.0 0.0 - 0.2 K/UL    Absolute Immature Granulocyte 0.1 0.0 - 0.5 K/UL       I have personally reviewed imaging studies showing: Other Studies:  CT HEAD WO CONTRAST   Final Result      1. No acute intracranial abnormality. 2. Chronic small vessel ischemic changes. 3. Chronic maxillary sinus disease. XR CHEST (2 VW)   Final Result   No acute airspace disease.              Current Meds:  Current Facility-Administered Medications   Medication Dose Route Frequency    pantoprazole (PROTONIX) tablet 40 mg  40 mg Oral QAM AC    amLODIPine (NORVASC) tablet 5 mg  5 mg Oral Daily    cloNIDine (CATAPRES) tablet 0.2 mg  0.2 mg Oral BID PRN    sodium chloride flush 0.9 % injection 5-40 mL  5-40 mL IntraVENous 2 times per day    sodium chloride flush 0.9 % injection 5-40 mL  5-40 mL IntraVENous PRN    0.9 % sodium chloride infusion   IntraVENous PRN    ondansetron (ZOFRAN-ODT) disintegrating tablet 4 mg  4 mg Oral Q8H PRN    Or    ondansetron (ZOFRAN) injection 4 mg  4 mg IntraVENous Q6H PRN    acetaminophen (TYLENOL) tablet 650 mg  650 mg Oral Q6H PRN    Or    acetaminophen (TYLENOL) suppository 650 mg  650 mg Rectal Q6H PRN    0.9 % sodium chloride bolus  100 mL IntraVENous ONCE PRN    iopamidol (ISOVUE-370) 76 % injection 50 mL  50 mL IntraVENous ONCE PRN    sodium chloride flush 0.9 % injection 10 mL  10 mL IntraVENous ONCE PRN       Signed:  Annette Lowe  [facility]

## 2023-02-01 NOTE — CARE COORDINATION
1000: Discharge planning was discussed with the Interdisciplinary Team. The patient is a potential discharge home today with home health services. RN CM received a message from Tre atkinson at [facility] reporting the facility is discussing assisted living options with the patient's son. 1155: RN CM met with the patient at the bedside and reviewed her discharge plan. She confirmed she is in agreement [facility]. She stated she does not feel she needs someone to stay with her all of the time and is not interested in assisted living facilities. She stated she has discussed it with her son. She is going to contact a friend to drive her home today. Discharge planning was discussed with the primary nurse. RN CM called the patient's son Hola Lea at [phone] and informed him of the discharge orders. He expressed concerns about his mom being alone. RN CM informed him that the patient stated she does not feel she needs anyone to stay with her. RN CM encouraged him to discuss his concerns with the patient and to consider hiring caregivers if needed. The patient is ambulatory, and per therapy services, she does not have any skilled therapy needs. He inquired about her discharge appeal rights and her Medicare letter. CARLOS HILL read the Important Message from Medicare letter to Desean Carty.

## 2023-02-02 ENCOUNTER — CARE COORDINATION (OUTPATIENT)
Dept: CARE COORDINATION | Facility: CLINIC | Age: 88
End: 2023-02-02

## 2023-02-02 NOTE — CARE COORDINATION
Care Transitions Outreach Attempt    Call within 2 business days of discharge: Yes   Attempted to reach patient for transitions of care follow up. Unable to reach patient. Patient: Daphnie Mckay Patient : 1932 MRN: [id number]    Last Discharge  Street       Date Complaint Diagnosis Description Type Department Provider    23 Dizziness Gastrointestinal hemorrhage, unspecified gastrointestinal hemorrhage type . .. ED to Hosp-Admission (Discharged) (ADMITTED) Stacia Ramos MD; Kathleen Mclean DO;. .. Was this an external facility discharge? No Discharge Facility: [facility]    Noted following upcoming appointments from discharge chart review:   [facility] follow up appointment(s):   Future Appointments   Date Time Provider Evy Green   2023  9:30 AM MD STEVE CarrollMIM [facility] [place]     [facility] follow up appointment(s):     Hospital Course: This is a 80-year-old female with past medical history significant for macular degeneration causing legal blindness, hypertension, GERD, dysphagia presented to the ER with concerns for decreased vision changes. She was found to have hemoglobin of 5.5 and GI bleed. She had received blood transfusion. GI was consulted. Patient had EGD and colonoscopy. EGD shows hiatal hernia with no evidence of active bleeding. H. pylori testing sent. Colonoscopy was negative. GI recommends resuming diet. IV Protonix switch to p.o. Protonix. She was started on oral iron. She also has B12 deficiency for which she was started on vitamin B12 supplementation. Aspirin was resumed. Patient was advised to restart Plavix if hemoglobin stable and no more active bleeding and cleared by PCP. She was alert by PT/OT. Recommendations for discharge home. This was discussed with patient's son over the phone. Patient is being discharged home today in a stable condition. Disposition: Home  Diet: ADULT DIET;  Regular; Low Fat/Low Chol/High Fiber/JUAN; No red dye  Code Status: Full Code     Follow Ups:   Follow-up Information     [facility] Follow up. Specialty: Home Health Services  Contact information:  [address]  [address]   Jose Angel Mcintyre MD. Schedule an appointment as soon as possible for a   visit in 2 week(s). Specialty: Gastroenterology  Contact information:  [address] [facility] [address]  [phone]    [facility] outreach initial call. Left message, identified self, reason for call, return call contact information, Melanie Barboza [address] [facility].

## 2023-02-03 ENCOUNTER — TELEPHONE (OUTPATIENT)
Dept: INTERNAL MEDICINE CLINIC | Facility: CLINIC | Age: 88
End: 2023-02-03

## 2023-02-03 ENCOUNTER — HOME CARE VISIT (OUTPATIENT)
Dept: HOME HEALTH SERVICES | Facility: HOME HEALTH | Age: 88
End: 2023-02-03

## 2023-02-03 ENCOUNTER — CARE COORDINATION (OUTPATIENT)
Dept: CARE COORDINATION | Facility: CLINIC | Age: 88
End: 2023-02-03

## 2023-02-03 NOTE — CARE COORDINATION
[facility] Care Transitions Initial Follow Up Call    Call within 2 business days of discharge: Yes    LPN Care Coordinator contacted the patient by telephone to perform post hospital discharge assessment. Verified name and  with patient as identifiers. Provided introduction to self, and explanation of the LPN Care Coordinator role. Patient: Veronica Garza Patient : 1932   MRN: [id number]  Reason for Admission: GIB  Discharge Date: 23 RARS: Readmission Risk Score: 12.2      Last Discharge [address]       Date Complaint Diagnosis Description Type Department Provider    23 Dizziness Gastrointestinal hemorrhage, unspecified gastrointestinal hemorrhage type . .. ED to Hosp-Admission (Discharged) (ADMITTED) Alexa Becker MD; Fior Hudson DO;. .. Was this an external facility discharge? No Discharge Facility: [facility]    Challenges to be reviewed by the provider   Additional needs identified to be addressed with provider: No  none               Method of communication with provider: none. LPN Care Coordinator reviewed discharge instructions, medical action plan, and red flags with patient who verbalized understanding. The patient was given an opportunity to ask questions and does not have any further questions or concerns at this time. Were discharge instructions available to patient? Yes. Reviewed appropriate site of care based on symptoms and resources available to patient including: PCP  Specialist  Urgent care clinics  [facility]  When to call [address] The patient agrees to contact the PCP office for questions related to their healthcare. Advance Care Planning:   Does patient have an Advance Directive: reviewed and current. Medication reconciliation was performed with patient, who verbalizes understanding of administration of home medications.  Medications reviewed, 1111F entered: N/A    Was patient discharged with a pulse oximeter? no    Non-face-to-face services provided:  Obtained and reviewed discharge summary and/or continuity of care documents  Communication with home health agencies or other community services the patient is currently using-[facility] [phone]  Education of patient/family/caregiver/guardian to support self-management-Elizabeth Bryant  [facility]  All scheduled appointments. Offered patient enrollment in the Remote Patient Monitoring (RPM) program for in-home monitoring: NA.    Care Transitions 24 Hour Call    Do you have a copy of your discharge instructions?: Yes  Do you have all of your prescriptions and are they filled?: Yes  Have you been contacted by a [address]?: No  Have you scheduled your follow up appointment?: Yes  How are you going to get to your appointment?: Car - family or friend to transport  Do you have support at home?: Alone  Do you feel like you have everything you need to keep you well at home?: Yes  Are you an active caregiver in your home?: No  Care Transitions Interventions    Physical Therapy: Completed     Occupational Therapy: Completed              Follow Up  Future Appointments   Date Time Provider Evy Green   8/24/2023  9:30 AM Patito Banerjee MD [facility]       [place] Care Coordinator provided contact information. Plan for follow-up call in 5-7 days based on severity of symptoms and risk factors. Plan for next call: self management-diet, hydration, exercise, follow up appointments. States is feeling well, happy to be home. Eating, drinking, and sleeping well. Denies c/o dizziness. Offers to assist with scheduling follow up appointments which she declined. States she has a friend with her who is going to schedule follow up appointments as she will also be providing transportation to appointments and will be able to arrange to coincide with her schedule.      Elisa Mejia LPN

## 2023-02-03 NOTE — TELEPHONE ENCOUNTER
Called pt and scheduled hospital f/u per Dr. Karly Fang. Pt agreed and confirmed she will be here for appt.

## 2023-02-10 ENCOUNTER — OFFICE VISIT (OUTPATIENT)
Dept: INTERNAL MEDICINE CLINIC | Facility: CLINIC | Age: 88
End: 2023-02-10

## 2023-02-10 ENCOUNTER — CARE COORDINATION (OUTPATIENT)
Dept: CARE COORDINATION | Facility: CLINIC | Age: 88
End: 2023-02-10

## 2023-02-10 VITALS
BODY MASS INDEX: 22.66 KG/M2 | OXYGEN SATURATION: 99 % | WEIGHT: 120 LBS | SYSTOLIC BLOOD PRESSURE: 128 MMHG | HEART RATE: 74 BPM | HEIGHT: 61 IN | DIASTOLIC BLOOD PRESSURE: 66 MMHG

## 2023-02-10 DIAGNOSIS — K92.2 GASTROINTESTINAL HEMORRHAGE, UNSPECIFIED GASTROINTESTINAL HEMORRHAGE TYPE: Primary | ICD-10-CM

## 2023-02-10 DIAGNOSIS — D50.0 IRON DEFICIENCY ANEMIA DUE TO CHRONIC BLOOD LOSS: ICD-10-CM

## 2023-02-10 DIAGNOSIS — I10 HYPERTENSION, UNSPECIFIED TYPE: ICD-10-CM

## 2023-02-10 RX ORDER — PANTOPRAZOLE SODIUM 40 MG/1
40 TABLET, DELAYED RELEASE ORAL
Qty: 30 TABLET | Refills: 3 | Status: SHIPPED | OUTPATIENT
Start: 2023-02-10 | End: 2023-03-12

## 2023-02-10 RX ORDER — FERROUS SULFATE 325(65) MG
325 TABLET ORAL 2 TIMES DAILY
Qty: 60 TABLET | Refills: 3 | Status: SHIPPED | OUTPATIENT
Start: 2023-02-10 | End: 2023-03-12

## 2023-02-10 SDOH — ECONOMIC STABILITY: FOOD INSECURITY: WITHIN THE PAST 12 MONTHS, YOU WORRIED THAT YOUR FOOD WOULD RUN OUT BEFORE YOU GOT MONEY TO BUY MORE.: NEVER TRUE

## 2023-02-10 SDOH — ECONOMIC STABILITY: HOUSING INSECURITY
IN THE LAST 12 MONTHS, WAS THERE A TIME WHEN YOU DID NOT HAVE A STEADY PLACE TO SLEEP OR SLEPT IN A SHELTER (INCLUDING NOW)?: NO

## 2023-02-10 SDOH — ECONOMIC STABILITY: INCOME INSECURITY: HOW HARD IS IT FOR YOU TO PAY FOR THE VERY BASICS LIKE FOOD, HOUSING, MEDICAL CARE, AND HEATING?: NOT HARD AT ALL

## 2023-02-10 SDOH — ECONOMIC STABILITY: FOOD INSECURITY: WITHIN THE PAST 12 MONTHS, THE FOOD YOU BOUGHT JUST DIDN'T LAST AND YOU DIDN'T HAVE MONEY TO GET MORE.: NEVER TRUE

## 2023-02-10 ASSESSMENT — PATIENT HEALTH QUESTIONNAIRE - PHQ9
SUM OF ALL RESPONSES TO PHQ QUESTIONS 1-9: 0
1. LITTLE INTEREST OR PLEASURE IN DOING THINGS: 0
SUM OF ALL RESPONSES TO PHQ QUESTIONS 1-9: 0
2. FEELING DOWN, DEPRESSED OR HOPELESS: 0
SUM OF ALL RESPONSES TO PHQ9 QUESTIONS 1 & 2: 0
SUM OF ALL RESPONSES TO PHQ QUESTIONS 1-9: 0
SUM OF ALL RESPONSES TO PHQ QUESTIONS 1-9: 0

## 2023-02-10 ASSESSMENT — ENCOUNTER SYMPTOMS
SHORTNESS OF BREATH: 0
ABDOMINAL PAIN: 0
CONSTIPATION: 0

## 2023-02-10 NOTE — PROGRESS NOTES
SUBJECTIVE:   Brenda Nobles is a 89 y.o. female seen for a visit regarding   Chief Complaint   Patient presents with    Follow-Up from Hospital     GI bleed         Other  This is a new problem. The current episode started 1 to 4 weeks ago (admitted 1/26 to 2/1/23 after woke with visual loss right eye-had Hb mid 5 -had EGD(had HH only), colon done(was ok)-saw her retina MD about the eye). The problem occurs constantly (B 12 287, ferritin 12-had 1 unit transfusion-Hb 7.8 at [facility]). Pertinent negatives include no abdominal pain or chest pain.     Past Medical History, Past Surgical History, Family history, Social History, and Medications were all reviewed with the patient today and updated as necessary.       Current Outpatient Medications   Medication Sig Dispense Refill    pantoprazole (PROTONIX) 40 MG tablet Take 1 tablet by mouth every morning (before breakfast) 30 tablet 3    ferrous sulfate (IRON 325) 325 (65 Fe) MG tablet Take 1 tablet by mouth 2 times daily 60 tablet 3    vitamin B-12 (CYANOCOBALAMIN) 1000 MCG tablet Take 1 tablet by mouth daily 30 tablet 1    aspirin 81 MG EC tablet Take 81 mg by mouth daily      zoledronic acid (RECLAST) 5 MG/100ML SOLN Infuse 100 mLs intravenously once for 1 dose 100 mL 0    clopidogrel (PLAVIX) 75 MG tablet Take 1 tablet by mouth daily (Patient not taking: Reported on 2/10/2023) 90 tablet 3     No current facility-administered medications for this visit.     Allergies   Allergen Reactions    Atorvastatin Other (See Comments)     Muscle aches    Ezetimibe-Simvastatin Other (See Comments)     Hair loss    Sulfa Antibiotics Rash     Patient Active Problem List   Diagnosis    Esophageal dysmotility    Osteopenia    Osteoarthritis, hand    Visual impairment severe bilaterally    Hypertension    DDD (degenerative disc disease), lumbar    GERD (gastroesophageal reflux disease)    Dysphagia    Visual impairment    Impaired fasting glucose    GIB (gastrointestinal bleeding)     Anemia    Macular degeneration    Glaucoma    GI bleed     Social History     Tobacco Use    Smoking status: Never    Smokeless tobacco: Never   Substance Use Topics    Alcohol use: Yes          Review of Systems   Eyes:  Positive for visual disturbance. Respiratory:  Negative for shortness of breath. Cardiovascular:  Negative for chest pain. Gastrointestinal:  Negative for abdominal pain and constipation. OBJECTIVE:  /66 (Site: Left Upper Arm, Position: Sitting)   Pulse 74   Ht 5' 1\" (1.549 m)   Wt 120 lb (54.4 kg)   SpO2 99%   BMI 22.67 kg/m²      Physical Exam       Medical problems and test results were reviewed with the patient today. ASSESSMENT and PLAN     1. Gastrointestinal hemorrhage, unspecified gastrointestinal hemorrhage type  -     pantoprazole (PROTONIX) 40 MG tablet; Take 1 tablet by mouth every morning (before breakfast), Disp-30 tablet, R-3Normal  -     CBC with Auto Differential; Future  -     CBC with Auto Differential; Future  2. Iron deficiency anemia due to chronic blood loss  -     ferrous sulfate (IRON 325) 325 (65 Fe) MG tablet; Take 1 tablet by mouth 2 times daily, Disp-60 tablet, R-3Normal  3.  Hypertension, unspecified type   Meds reconciled ; cut iron to qd ; check CBC ; check again in 3 week; stay off plavix --had planned to stop at summer visit(was for visual TIA end of June  lab results and schedule for future lab studies reviewed with patient  reviewed medications and side effects in detail     Return keep August.

## 2023-02-10 NOTE — CARE COORDINATION
Care Transitions Outreach Attempt    Call within 2 business days of discharge: Yes   Attempted to reach patient for transitions of care follow up. Unable to reach patient. Patient: Rigo Calzada Patient : 1932 MRN: [id number]    Last Discharge  Street       Date Complaint Diagnosis Description Type Department Provider    23 Dizziness Gastrointestinal hemorrhage, unspecified gastrointestinal hemorrhage type . .. ED to Hosp-Admission (Discharged) (ADMITTED) Keara Garnica MD; Adeola Fang DO;. .. Was this an external facility discharge? No Discharge Facility: [facility]    Noted following upcoming appointments from discharge chart review:   [facility]):   Future Appointments   Date Time Provider Evy Norma   2023  9:30 AM Fidelina Armstrong MD [facility]     Non[facility] follow up appointment(s): na    [facility] outreach follow up call. Left message, identified self, reason for call, return call contact information, Shakila Simmons [address].

## 2023-02-13 DIAGNOSIS — K92.2 GASTROINTESTINAL HEMORRHAGE, UNSPECIFIED GASTROINTESTINAL HEMORRHAGE TYPE: Primary | ICD-10-CM

## 2023-02-13 DIAGNOSIS — D50.0 IRON DEFICIENCY ANEMIA DUE TO CHRONIC BLOOD LOSS: ICD-10-CM

## 2023-02-17 ENCOUNTER — CARE COORDINATION (OUTPATIENT)
Dept: CARE COORDINATION | Facility: CLINIC | Age: 88
End: 2023-02-17

## 2023-02-17 NOTE — CARE COORDINATION
[facility] Care Transitions Follow Up Call    Patient Current Location:  Home: [address] [place] [address] Road Coordinator contacted the patient by telephone to follow up after admission on 2023. Verified name and  with patient as identifiers. Patient: Bryce Oliva  Patient : [id number]   MRN: [id number]  Reason for Admission: GIB  Discharge Date: 23 RARS: Readmission Risk Score: 12.2      Needs to be reviewed by the provider   Additional needs identified to be addressed with provider: No  none             Method of communication with provider: none. Addressed changes since last contact:  none  Discussed follow-up appointments. If no appointment was previously scheduled, appointment scheduling offered: Yes. Is follow up appointment scheduled within 7 days of discharge? No.    Follow Up  Future Appointments   Date Time Provider Evy Green   2023  9:30 AM MD STEVE WoodyMIM [facility] AMB     Non-[facility] follow up appointment(s): na    LPN Care Coordinator reviewed discharge instructions, medical action plan, and red flags with patient and discussed any barriers to care and/or understanding of plan of care after discharge. Discussed appropriate site of care based on symptoms and resources available to patient including: PCP  Specialist  Urgent care clinics  When to call [address] The patient agrees to contact the PCP office for questions related to their healthcare. Advance Care Planning:   reviewed and current. Patients top risk factors for readmission: medical condition-HTN, GIB, GERD, Dysphagia, Anemia, Macular Degeneration, Glaucoma, OA, DDD, Legally Blind  Interventions to address risk factors: Obtained and reviewed discharge summary and/or continuity of care documents, Education of patient/family/caregiver/guardian to support self-management-Elizabeth Bryant LPN [phone], and all scheduled appointments.      Offered patient enrollment in the Remote Patient Monitoring (RPM) program for in-home monitoring: NA.     Care Transitions Subsequent and Final Call    Subsequent and Final Calls  Do you have any ongoing symptoms?: No  Have your medications changed?: No  Do you have any questions related to your medications?: No  Do you currently have any active services?: No  Do you have any needs or concerns that I can assist you with?: No  Identified Barriers: None  Care Transitions Interventions    Physical Therapy: Completed     Occupational Therapy: Completed     Other Interventions:             LPN Care Coordinator provided contact information for future needs. Plan for follow-up call in 7-10 days based on severity of symptoms and risk factors. Plan for next call: self management-diet, hydration, exercise, follow up appointments.      Pippa Portillo LPN

## 2023-02-24 ENCOUNTER — CARE COORDINATION (OUTPATIENT)
Dept: CARE COORDINATION | Facility: CLINIC | Age: 88
End: 2023-02-24

## 2023-02-24 NOTE — CARE COORDINATION
Care Transitions Outreach Attempt    Call within 2 business days of discharge: Yes   Attempted to reach patient for transitions of care follow up. Unable to reach patient. Patient: Gabby Crandall Patient : [id number] MRN: [id number]    Last Discharge  Street       Date Complaint Diagnosis Description Type Department Provider    23 Dizziness Gastrointestinal hemorrhage, unspecified gastrointestinal hemorrhage type . .. ED to Hosp-Admission (Discharged) (ADMITTED) Reyes Paganini, MD; Carly Stone DO;. .. Was this an external facility discharge? No Discharge Facility: [facility]    Noted following upcoming appointments from discharge chart review:   [facility] follow up appointment(s):   Future Appointments   Date Time Provider Evy Green   2023  9:30 AM Hermes Albright MD MLMIM [facility] AMB     Non-[facility] follow up appointment(s): na    CORKY outreach follow up call. Left message, identified self, reason for call, return call contact information, Sinan Kong N [address] [place] [address].

## 2023-03-02 DIAGNOSIS — D50.0 IRON DEFICIENCY ANEMIA DUE TO CHRONIC BLOOD LOSS: ICD-10-CM

## 2023-03-02 DIAGNOSIS — K92.2 GASTROINTESTINAL HEMORRHAGE, UNSPECIFIED GASTROINTESTINAL HEMORRHAGE TYPE: ICD-10-CM

## 2023-03-02 LAB
BASOPHILS # BLD: 0 K/UL (ref 0–0.2)
BASOPHILS NFR BLD: 0 % (ref 0–2)
DIFFERENTIAL METHOD BLD: ABNORMAL
EOSINOPHIL # BLD: 0.1 K/UL (ref 0–0.8)
EOSINOPHIL NFR BLD: 2 % (ref 0.5–7.8)
ERYTHROCYTE [DISTWIDTH] IN BLOOD BY AUTOMATED COUNT: 22.4 % (ref 11.9–14.6)
HCT VFR BLD AUTO: 40 % (ref 35.8–46.3)
HGB BLD-MCNC: 12.6 G/DL (ref 11.7–15.4)
IMM GRANULOCYTES # BLD AUTO: 0 K/UL (ref 0–0.5)
IMM GRANULOCYTES NFR BLD AUTO: 0 % (ref 0–5)
LYMPHOCYTES # BLD: 1.7 K/UL (ref 0.5–4.6)
LYMPHOCYTES NFR BLD: 33 % (ref 13–44)
MCH RBC QN AUTO: 29.9 PG (ref 26.1–32.9)
MCHC RBC AUTO-ENTMCNC: 31.5 G/DL (ref 31.4–35)
MCV RBC AUTO: 95 FL (ref 82–102)
MONOCYTES # BLD: 0.5 K/UL (ref 0.1–1.3)
MONOCYTES NFR BLD: 9 % (ref 4–12)
NEUTS SEG # BLD: 2.9 K/UL (ref 1.7–8.2)
NEUTS SEG NFR BLD: 56 % (ref 43–78)
NRBC # BLD: 0 K/UL (ref 0–0.2)
PLATELET # BLD AUTO: 266 K/UL (ref 150–450)
PMV BLD AUTO: 10.7 FL (ref 9.4–12.3)
RBC # BLD AUTO: 4.21 M/UL (ref 4.05–5.2)
WBC # BLD AUTO: 5.2 K/UL (ref 4.3–11.1)

## 2023-03-07 ENCOUNTER — TELEPHONE (OUTPATIENT)
Dept: INTERNAL MEDICINE CLINIC | Facility: CLINIC | Age: 88
End: 2023-03-07

## 2023-03-07 ENCOUNTER — CARE COORDINATION (OUTPATIENT)
Dept: CARE COORDINATION | Facility: CLINIC | Age: 88
End: 2023-03-07

## 2023-03-07 NOTE — TELEPHONE ENCOUNTER
Spoke with pt and said the hospital put her on pantoprazole and it is causing both stomach and esophageal irritation. Pt asking if she can d/c this and take omeprazole instead?

## 2023-03-07 NOTE — TELEPHONE ENCOUNTER
----- Message from Lord Walsh sent at 3/7/2023 10:37 AM EST -----  Subject: Message to Provider    QUESTIONS  Information for Provider? Patient would like to know if Dr. Chase Rock would be   willing to allow her to stop taking the pantoprazole. States she has been   having some discomfort in her throat and down into her check. not sure if   its the medicaiton or something else  ---------------------------------------------------------------------------  --------------  Estefania Calvin INFO  [phone]; OK to leave message on voicemail  ---------------------------------------------------------------------------  --------------  SCRIPT ANSWERS  Relationship to Patient?  Self

## 2023-03-07 NOTE — CARE COORDINATION
Patient has graduated from the Care Transitions program on 03/07/2023. Patient/family has the ability to self-manage at this time. Patient has no further care management needs, no referral to the [facility] team for further management. Patient has Care Transition Nurse's contact information for any further questions, concerns, or needs.  Saul Townsend [address] [phone]  Patients upcoming visits:    Future Appointments   Date Time Provider Evy Green   8/24/2023  9:30 AM Bisi Albert MD [facility] AMB

## 2023-03-21 RX ORDER — LANOLIN ALCOHOL/MO/W.PET/CERES
1000 CREAM (GRAM) TOPICAL DAILY
Qty: 90 TABLET | Refills: 3 | Status: SHIPPED | OUTPATIENT
Start: 2023-03-21 | End: 2023-04-20

## 2023-03-21 NOTE — TELEPHONE ENCOUNTER
----- Message from Ritchie Reece sent at 3/21/2023  1:26 PM EDT -----  Subject: Refill Request    QUESTIONS  Name of Medication? vitamin B-12 (CYANOCOBALAMIN) 1000 MCG tablet  Patient-reported dosage and instructions? one a day  How many days do you have left? 0  Preferred Pharmacy? [id number] [facility] phone number (if available)? [phone]  ---------------------------------------------------------------------------  --------------  Caryle Auerbach INFO  What is the best way for the office to contact you? OK to leave message on   voicemail  Preferred Call Back Phone Number? [phone]  ---------------------------------------------------------------------------  --------------  SCRIPT ANSWERS  Relationship to Patient?  Self

## 2023-07-18 DIAGNOSIS — R73.01 IMPAIRED FASTING GLUCOSE: ICD-10-CM

## 2023-07-18 DIAGNOSIS — I10 HYPERTENSION, UNSPECIFIED TYPE: Primary | ICD-10-CM

## 2023-07-18 NOTE — TELEPHONE ENCOUNTER
----- Message from Tremiane Mukherjee sent at 7/18/2023  3:13 PM EDT -----  Subject: Message to Provider    QUESTIONS  Information for Provider? Patient has been getting OTC acid reflux   medication but wanted to see if she can get a prescription for Omeprazole. She would like it sent to [address] [facility], [address] [place] [address] Please advise.   ---------------------------------------------------------------------------  --------------  Destiny FITCH  [phone]; OK to leave message on voicemail  ---------------------------------------------------------------------------  --------------  SCRIPT ANSWERS  Relationship to Patient?  Self

## 2023-07-19 RX ORDER — OMEPRAZOLE 20 MG/1
20 CAPSULE, DELAYED RELEASE ORAL DAILY
COMMUNITY
End: 2023-07-20 | Stop reason: SDUPTHER

## 2023-07-19 NOTE — TELEPHONE ENCOUNTER
Dr Shiv Garzas pt called me back and asked if you would prescribe Omeprazole 20 mg QD 90 day supply to [facility] ? I penedd rx to you if ok to sent in  Pt said this is working well and she has been buying rx OTC

## 2023-07-20 RX ORDER — OMEPRAZOLE 20 MG/1
20 CAPSULE, DELAYED RELEASE ORAL DAILY
Qty: 90 CAPSULE | Refills: 3 | Status: SHIPPED | OUTPATIENT
Start: 2023-07-20

## 2023-08-21 ENCOUNTER — TELEPHONE (OUTPATIENT)
Dept: INTERNAL MEDICINE CLINIC | Facility: CLINIC | Age: 88
End: 2023-08-21

## 2023-08-21 DIAGNOSIS — K21.9 GASTROESOPHAGEAL REFLUX DISEASE WITHOUT ESOPHAGITIS: Primary | ICD-10-CM

## 2023-08-21 DIAGNOSIS — E78.5 HYPERLIPIDEMIA, UNSPECIFIED HYPERLIPIDEMIA TYPE: Primary | ICD-10-CM

## 2023-08-21 RX ORDER — OMEPRAZOLE 40 MG/1
40 CAPSULE, DELAYED RELEASE ORAL DAILY
Qty: 90 CAPSULE | Refills: 3 | Status: SHIPPED | OUTPATIENT
Start: 2023-08-21

## 2023-08-21 NOTE — TELEPHONE ENCOUNTER
----- Message from Jhon Vieira sent at 8/21/2023  2:56 PM EDT -----  Subject: Medication Problem    Medication: omeprazole (PRILOSEC) 20 MG delayed release capsule  Dosage: 20 mg  Ordering Provider: gail    Question/Problem: Patient would like to move up to 40mg. she is burping a   lot.       Pharmacy: [address] [place] [address], \[facility]\"" [phone]    ---------------------------------------------------------------------------  --------------  Shaquille Montague INFO  [phone]; OK to leave message on voicemail  ---------------------------------------------------------------------------  --------------    SCRIPT ANSWERS  Relationship to Patient: Self

## 2023-09-29 ENCOUNTER — OFFICE VISIT (OUTPATIENT)
Dept: INTERNAL MEDICINE CLINIC | Facility: CLINIC | Age: 88
End: 2023-09-29

## 2023-09-29 VITALS
DIASTOLIC BLOOD PRESSURE: 70 MMHG | HEIGHT: 61 IN | WEIGHT: 127 LBS | SYSTOLIC BLOOD PRESSURE: 122 MMHG | BODY MASS INDEX: 23.98 KG/M2

## 2023-09-29 DIAGNOSIS — I10 HYPERTENSION, UNSPECIFIED TYPE: ICD-10-CM

## 2023-09-29 DIAGNOSIS — M85.859 OSTEOPENIA OF HIP, UNSPECIFIED LATERALITY: ICD-10-CM

## 2023-09-29 DIAGNOSIS — Z00.00 MEDICARE ANNUAL WELLNESS VISIT, SUBSEQUENT: Primary | ICD-10-CM

## 2023-09-29 DIAGNOSIS — E78.5 HYPERLIPIDEMIA, UNSPECIFIED HYPERLIPIDEMIA TYPE: ICD-10-CM

## 2023-09-29 DIAGNOSIS — Z78.0 ASYMPTOMATIC MENOPAUSAL STATE: ICD-10-CM

## 2023-09-29 DIAGNOSIS — H54.2X22 VISUAL IMPAIRMENT SEVERE BILATERALLY: ICD-10-CM

## 2023-09-29 DIAGNOSIS — R73.01 IMPAIRED FASTING GLUCOSE: ICD-10-CM

## 2023-09-29 LAB
ALBUMIN SERPL-MCNC: 3.8 G/DL (ref 3.2–4.6)
ALBUMIN/GLOB SERPL: 1.2 (ref 0.4–1.6)
ALP SERPL-CCNC: 87 U/L (ref 50–136)
ALT SERPL-CCNC: 26 U/L (ref 12–65)
ANION GAP SERPL CALC-SCNC: 11 MMOL/L (ref 2–11)
AST SERPL-CCNC: 20 U/L (ref 15–37)
BASOPHILS # BLD: 0 K/UL (ref 0–0.2)
BASOPHILS NFR BLD: 1 % (ref 0–2)
BILIRUB SERPL-MCNC: 0.5 MG/DL (ref 0.2–1.1)
BUN SERPL-MCNC: 19 MG/DL (ref 8–23)
CALCIUM SERPL-MCNC: 9.1 MG/DL (ref 8.3–10.4)
CHLORIDE SERPL-SCNC: 100 MMOL/L (ref 101–110)
CHOLEST SERPL-MCNC: 314 MG/DL
CO2 SERPL-SCNC: 24 MMOL/L (ref 21–32)
CREAT SERPL-MCNC: 0.7 MG/DL (ref 0.6–1)
DIFFERENTIAL METHOD BLD: NORMAL
EOSINOPHIL # BLD: 0.1 K/UL (ref 0–0.8)
EOSINOPHIL NFR BLD: 2 % (ref 0.5–7.8)
ERYTHROCYTE [DISTWIDTH] IN BLOOD BY AUTOMATED COUNT: 12.6 % (ref 11.9–14.6)
EST. AVERAGE GLUCOSE BLD GHB EST-MCNC: 169 MG/DL
GLOBULIN SER CALC-MCNC: 3.3 G/DL (ref 2.8–4.5)
GLUCOSE SERPL-MCNC: 141 MG/DL (ref 65–100)
HBA1C MFR BLD: 7.5 % (ref 4.8–5.6)
HCT VFR BLD AUTO: 44.5 % (ref 35.8–46.3)
HDLC SERPL-MCNC: 49 MG/DL (ref 40–60)
HDLC SERPL: 6.4
HGB BLD-MCNC: 14.8 G/DL (ref 11.7–15.4)
IMM GRANULOCYTES # BLD AUTO: 0 K/UL (ref 0–0.5)
IMM GRANULOCYTES NFR BLD AUTO: 0 % (ref 0–5)
LDLC SERPL CALC-MCNC: 235.6 MG/DL
LYMPHOCYTES # BLD: 2.1 K/UL (ref 0.5–4.6)
LYMPHOCYTES NFR BLD: 33 % (ref 13–44)
MCH RBC QN AUTO: 30.5 PG (ref 26.1–32.9)
MCHC RBC AUTO-ENTMCNC: 33.3 G/DL (ref 31.4–35)
MCV RBC AUTO: 91.8 FL (ref 82–102)
MONOCYTES # BLD: 0.6 K/UL (ref 0.1–1.3)
MONOCYTES NFR BLD: 9 % (ref 4–12)
NEUTS SEG # BLD: 3.4 K/UL (ref 1.7–8.2)
NEUTS SEG NFR BLD: 55 % (ref 43–78)
NRBC # BLD: 0 K/UL (ref 0–0.2)
PLATELET # BLD AUTO: 258 K/UL (ref 150–450)
PMV BLD AUTO: 10.5 FL (ref 9.4–12.3)
POTASSIUM SERPL-SCNC: 4.4 MMOL/L (ref 3.5–5.1)
PROT SERPL-MCNC: 7.1 G/DL (ref 6.3–8.2)
RBC # BLD AUTO: 4.85 M/UL (ref 4.05–5.2)
SODIUM SERPL-SCNC: 135 MMOL/L (ref 133–143)
TRIGL SERPL-MCNC: 147 MG/DL (ref 35–150)
VLDLC SERPL CALC-MCNC: 29.4 MG/DL (ref 6–23)
WBC # BLD AUTO: 6.2 K/UL (ref 4.3–11.1)

## 2023-09-29 RX ORDER — ZOLEDRONIC ACID 5 MG/100ML
5 INJECTION, SOLUTION INTRAVENOUS ONCE
Qty: 100 ML | Refills: 0 | Status: SHIPPED | OUTPATIENT
Start: 2023-09-29 | End: 2023-09-29

## 2023-09-29 ASSESSMENT — PATIENT HEALTH QUESTIONNAIRE - PHQ9
SUM OF ALL RESPONSES TO PHQ QUESTIONS 1-9: 0
SUM OF ALL RESPONSES TO PHQ9 QUESTIONS 1 & 2: 0
SUM OF ALL RESPONSES TO PHQ QUESTIONS 1-9: 0
1. LITTLE INTEREST OR PLEASURE IN DOING THINGS: 0
2. FEELING DOWN, DEPRESSED OR HOPELESS: 0
SUM OF ALL RESPONSES TO PHQ QUESTIONS 1-9: 0
SUM OF ALL RESPONSES TO PHQ QUESTIONS 1-9: 0

## 2023-09-29 NOTE — PROGRESS NOTES
Medicare Annual Wellness Visit    Vince Snellen is here for Medicare AWV (AWV)    Assessment & Plan   Medicare annual wellness visit, subsequent  Hyperlipidemia, unspecified hyperlipidemia type  Osteopenia of hip, unspecified laterality  -     zoledronic acid (RECLAST) 5 MG/100ML SOLN; Infuse 100 mLs intravenously once for 1 dose, Disp-100 mL, R-0Print  Visual impairment severe bilaterally  Asymptomatic menopausal state    Recommendations for Preventive Services Due: see orders and patient instructions/AVS.  Recommended screening schedule for the next 5-10 years is provided to the patient in written form: see Patient Instructions/AVS.   DExa not accurate or valid at 90-plan one more reclast then stop-her risk 2020 was 5.2% in hip then  Return in about 1 year (around 9/29/2024) for For Medicare wellness-Dr Perera. Subjective   The following acute and/or chronic problems were also addressed today:  Hx A1C at 6.5 to 6.6 %-had GI bleed and ? Source - upper and lower endo ok early this year-no subsequent issue-Hb later 12.6-has osteopenia and had 2 doses reclast    Patient's complete Health Risk Assessment and screening values have been reviewed and are found in Flowsheets. The following problems were reviewed today and where indicated follow up appointments were made and/or referrals ordered. Positive Risk Factor Screenings with Interventions:                    Vision Screen:  Do you have difficulty driving, watching TV, or doing any of your daily activities because of your eyesight?: (!) Yes (vision loss)  Have you had an eye exam within the past year?: Yes  No results found.     Interventions:   Patient declines any further evaluation or treatment     ADL's:   Patient reports needing help with:  Select all that apply: (!) Transportation (due to vision loss)  Has 10 family or friends that transport her-lives alone   Interventions:  Patient declined any further interventions or treatment

## 2023-10-02 DIAGNOSIS — R73.01 IMPAIRED FASTING GLUCOSE: ICD-10-CM

## 2023-10-02 DIAGNOSIS — E78.5 HYPERLIPIDEMIA, UNSPECIFIED HYPERLIPIDEMIA TYPE: Primary | ICD-10-CM

## 2023-10-02 RX ORDER — PRAVASTATIN SODIUM 20 MG
20 TABLET ORAL DAILY
Qty: 90 TABLET | Refills: 3 | Status: SHIPPED | OUTPATIENT
Start: 2023-10-02

## 2023-10-02 NOTE — TELEPHONE ENCOUNTER
Pt notified of lab results per Dr. Cyndie Graves. Agreed to watch her diet more (has been eating out a lot more due to vision issues). Agreed to start Pravastatin 20 mg and recheck labs in 3 months. Lab orders placed and rx pended for Dr. Cyndie Graves.

## 2023-10-02 NOTE — TELEPHONE ENCOUNTER
----- Message from Catrachita Salinas MD sent at 10/1/2023  9:42 PM EDT -----  The LDL is very high at 235-this is high risk and could consider low dose pravastatin 20 mg (since had muscle sx on lipitor in past); the A1C is higher at 7.5 -well into diabetic range-for now can watch carbs but may need metformin -would repeat the A1C in 3 mo and the LDL on statin

## 2023-10-04 DIAGNOSIS — M85.859 OSTEOPENIA OF HIP, UNSPECIFIED LATERALITY: Primary | ICD-10-CM

## 2023-10-04 RX ORDER — FAMOTIDINE 10 MG/ML
20 INJECTION, SOLUTION INTRAVENOUS
OUTPATIENT
Start: 2023-10-04

## 2023-10-04 RX ORDER — SODIUM CHLORIDE 9 MG/ML
5-250 INJECTION, SOLUTION INTRAVENOUS PRN
OUTPATIENT
Start: 2023-10-04

## 2023-10-04 RX ORDER — EPINEPHRINE 1 MG/ML
0.3 INJECTION, SOLUTION, CONCENTRATE INTRAVENOUS PRN
OUTPATIENT
Start: 2023-10-04

## 2023-10-04 RX ORDER — ONDANSETRON 2 MG/ML
8 INJECTION INTRAMUSCULAR; INTRAVENOUS
OUTPATIENT
Start: 2023-10-04

## 2023-10-04 RX ORDER — ZOLEDRONIC ACID 5 MG/100ML
5 INJECTION, SOLUTION INTRAVENOUS ONCE
OUTPATIENT
Start: 2023-10-04 | End: 2023-10-04

## 2023-10-04 RX ORDER — SODIUM CHLORIDE 0.9 % (FLUSH) 0.9 %
5-40 SYRINGE (ML) INJECTION PRN
OUTPATIENT
Start: 2023-10-04

## 2023-10-04 RX ORDER — ACETAMINOPHEN 325 MG/1
650 TABLET ORAL
OUTPATIENT
Start: 2023-10-04

## 2023-10-04 RX ORDER — SODIUM CHLORIDE 9 MG/ML
INJECTION, SOLUTION INTRAVENOUS CONTINUOUS
OUTPATIENT
Start: 2023-10-04

## 2023-10-04 RX ORDER — DIPHENHYDRAMINE HYDROCHLORIDE 50 MG/ML
50 INJECTION INTRAMUSCULAR; INTRAVENOUS
OUTPATIENT
Start: 2023-10-04

## 2023-10-04 RX ORDER — ACETAMINOPHEN 325 MG/1
650 TABLET ORAL ONCE
Start: 2023-10-04 | End: 2023-10-04

## 2023-10-04 RX ORDER — HEPARIN SODIUM (PORCINE) LOCK FLUSH IV SOLN 100 UNIT/ML 100 UNIT/ML
500 SOLUTION INTRAVENOUS PRN
OUTPATIENT
Start: 2023-10-04

## 2023-10-04 RX ORDER — ALBUTEROL SULFATE 90 UG/1
4 AEROSOL, METERED RESPIRATORY (INHALATION) PRN
OUTPATIENT
Start: 2023-10-04

## 2023-10-27 ENCOUNTER — HOSPITAL ENCOUNTER (INPATIENT)
Age: 88
LOS: 3 days | Discharge: NURSING, REHAB OR LONG-TERM CARE | DRG: 066 | End: 2023-10-31
Attending: EMERGENCY MEDICINE | Admitting: HOSPITALIST
Payer: MEDICARE

## 2023-10-27 ENCOUNTER — APPOINTMENT (OUTPATIENT)
Dept: CT IMAGING | Age: 88
DRG: 066 | End: 2023-10-27
Payer: MEDICARE

## 2023-10-27 DIAGNOSIS — E78.5 HYPERLIPIDEMIA, UNSPECIFIED HYPERLIPIDEMIA TYPE: ICD-10-CM

## 2023-10-27 DIAGNOSIS — E78.5 DYSLIPIDEMIA: ICD-10-CM

## 2023-10-27 DIAGNOSIS — I16.0 HYPERTENSIVE URGENCY: ICD-10-CM

## 2023-10-27 DIAGNOSIS — R07.9 CHEST PAIN IN ADULT: ICD-10-CM

## 2023-10-27 DIAGNOSIS — I63.9 CEREBROVASCULAR ACCIDENT (CVA), UNSPECIFIED MECHANISM (HCC): Primary | ICD-10-CM

## 2023-10-27 LAB
ALBUMIN SERPL-MCNC: 3.6 G/DL (ref 3.2–4.6)
ALBUMIN SERPL-MCNC: 3.8 G/DL (ref 3.2–4.6)
ALBUMIN/GLOB SERPL: 1.1 (ref 0.4–1.6)
ALBUMIN/GLOB SERPL: 1.2 (ref 0.4–1.6)
ALP SERPL-CCNC: 85 U/L (ref 50–136)
ALP SERPL-CCNC: 92 U/L (ref 50–136)
ALT SERPL-CCNC: 27 U/L (ref 12–65)
ALT SERPL-CCNC: 31 U/L (ref 12–65)
ANION GAP SERPL CALC-SCNC: 6 MMOL/L (ref 2–11)
ANION GAP SERPL CALC-SCNC: 7 MMOL/L (ref 2–11)
APPEARANCE UR: ABNORMAL
AST SERPL-CCNC: 15 U/L (ref 15–37)
AST SERPL-CCNC: 17 U/L (ref 15–37)
BACTERIA URNS QL MICRO: NORMAL /HPF
BASOPHILS # BLD: 0 K/UL (ref 0–0.2)
BASOPHILS NFR BLD: 1 % (ref 0–2)
BILIRUB SERPL-MCNC: 0.4 MG/DL (ref 0.2–1.1)
BILIRUB SERPL-MCNC: 0.4 MG/DL (ref 0.2–1.1)
BILIRUB UR QL: NEGATIVE
BUN SERPL-MCNC: 15 MG/DL (ref 8–23)
BUN SERPL-MCNC: 17 MG/DL (ref 8–23)
CALCIUM SERPL-MCNC: 9.1 MG/DL (ref 8.3–10.4)
CALCIUM SERPL-MCNC: 9.6 MG/DL (ref 8.3–10.4)
CASTS URNS QL MICRO: NORMAL /LPF
CHLORIDE SERPL-SCNC: 104 MMOL/L (ref 101–110)
CHLORIDE SERPL-SCNC: 105 MMOL/L (ref 101–110)
CO2 SERPL-SCNC: 29 MMOL/L (ref 21–32)
CO2 SERPL-SCNC: 29 MMOL/L (ref 21–32)
COLOR UR: ABNORMAL
CREAT SERPL-MCNC: 0.8 MG/DL (ref 0.6–1)
CREAT SERPL-MCNC: 0.86 MG/DL (ref 0.6–1)
CRYSTALS URNS QL MICRO: NORMAL /LPF
DIFFERENTIAL METHOD BLD: NORMAL
EKG ATRIAL RATE: 64 BPM
EKG ATRIAL RATE: 71 BPM
EKG DIAGNOSIS: NORMAL
EKG DIAGNOSIS: NORMAL
EKG P AXIS: 6 DEGREES
EKG P AXIS: 82 DEGREES
EKG P-R INTERVAL: 165 MS
EKG P-R INTERVAL: 187 MS
EKG Q-T INTERVAL: 395 MS
EKG Q-T INTERVAL: 413 MS
EKG QRS DURATION: 118 MS
EKG QRS DURATION: 120 MS
EKG QTC CALCULATION (BAZETT): 427 MS
EKG QTC CALCULATION (BAZETT): 433 MS
EKG R AXIS: -29 DEGREES
EKG R AXIS: -37 DEGREES
EKG T AXIS: 14 DEGREES
EKG T AXIS: 34 DEGREES
EKG VENTRICULAR RATE: 64 BPM
EKG VENTRICULAR RATE: 72 BPM
EOSINOPHIL # BLD: 0.1 K/UL (ref 0–0.8)
EOSINOPHIL NFR BLD: 2 % (ref 0.5–7.8)
EPI CELLS #/AREA URNS HPF: NORMAL /HPF
ERYTHROCYTE [DISTWIDTH] IN BLOOD BY AUTOMATED COUNT: 12.8 % (ref 11.9–14.6)
GLOBULIN SER CALC-MCNC: 3.3 G/DL (ref 2.8–4.5)
GLOBULIN SER CALC-MCNC: 3.3 G/DL (ref 2.8–4.5)
GLUCOSE SERPL-MCNC: 172 MG/DL (ref 65–100)
GLUCOSE SERPL-MCNC: 237 MG/DL (ref 65–100)
GLUCOSE UR STRIP.AUTO-MCNC: 500 MG/DL
HCT VFR BLD AUTO: 44.1 % (ref 35.8–46.3)
HGB BLD-MCNC: 14.6 G/DL (ref 11.7–15.4)
HGB UR QL STRIP: ABNORMAL
IMM GRANULOCYTES # BLD AUTO: 0 K/UL (ref 0–0.5)
IMM GRANULOCYTES NFR BLD AUTO: 0 % (ref 0–5)
KETONES UR QL STRIP.AUTO: NEGATIVE MG/DL
LEUKOCYTE ESTERASE UR QL STRIP.AUTO: ABNORMAL
LYMPHOCYTES # BLD: 2.1 K/UL (ref 0.5–4.6)
LYMPHOCYTES NFR BLD: 31 % (ref 13–44)
MCH RBC QN AUTO: 30.3 PG (ref 26.1–32.9)
MCHC RBC AUTO-ENTMCNC: 33.1 G/DL (ref 31.4–35)
MCV RBC AUTO: 91.5 FL (ref 82–102)
MONOCYTES # BLD: 0.7 K/UL (ref 0.1–1.3)
MONOCYTES NFR BLD: 10 % (ref 4–12)
MUCOUS THREADS URNS QL MICRO: 0 /LPF
NEUTS SEG # BLD: 3.8 K/UL (ref 1.7–8.2)
NEUTS SEG NFR BLD: 56 % (ref 43–78)
NITRITE UR QL STRIP.AUTO: NEGATIVE
NRBC # BLD: 0 K/UL (ref 0–0.2)
PH UR STRIP: 5 (ref 5–9)
PLATELET # BLD AUTO: 249 K/UL (ref 150–450)
PMV BLD AUTO: 10.3 FL (ref 9.4–12.3)
POTASSIUM SERPL-SCNC: 3.7 MMOL/L (ref 3.5–5.1)
POTASSIUM SERPL-SCNC: 4.2 MMOL/L (ref 3.5–5.1)
PROT SERPL-MCNC: 6.9 G/DL (ref 6.3–8.2)
PROT SERPL-MCNC: 7.1 G/DL (ref 6.3–8.2)
PROT UR STRIP-MCNC: 30 MG/DL
RBC # BLD AUTO: 4.82 M/UL (ref 4.05–5.2)
RBC #/AREA URNS HPF: NORMAL /HPF
SODIUM SERPL-SCNC: 140 MMOL/L (ref 133–143)
SODIUM SERPL-SCNC: 140 MMOL/L (ref 133–143)
SP GR UR REFRACTOMETRY: 1.02 (ref 1–1.02)
TROPONIN I SERPL HS-MCNC: 40.7 PG/ML (ref 0–14)
TROPONIN I SERPL HS-MCNC: 44.1 PG/ML (ref 0–14)
UROBILINOGEN UR QL STRIP.AUTO: 1 EU/DL (ref 0.2–1)
WBC # BLD AUTO: 6.8 K/UL (ref 4.3–11.1)
WBC URNS QL MICRO: NORMAL /HPF

## 2023-10-27 PROCEDURE — 81001 URINALYSIS AUTO W/SCOPE: CPT

## 2023-10-27 PROCEDURE — 6360000004 HC RX CONTRAST MEDICATION: Performed by: EMERGENCY MEDICINE

## 2023-10-27 PROCEDURE — 70498 CT ANGIOGRAPHY NECK: CPT

## 2023-10-27 PROCEDURE — 96376 TX/PRO/DX INJ SAME DRUG ADON: CPT

## 2023-10-27 PROCEDURE — 85025 COMPLETE CBC W/AUTO DIFF WBC: CPT

## 2023-10-27 PROCEDURE — 96375 TX/PRO/DX INJ NEW DRUG ADDON: CPT

## 2023-10-27 PROCEDURE — G0378 HOSPITAL OBSERVATION PER HR: HCPCS

## 2023-10-27 PROCEDURE — 2580000003 HC RX 258: Performed by: EMERGENCY MEDICINE

## 2023-10-27 PROCEDURE — 36415 COLL VENOUS BLD VENIPUNCTURE: CPT

## 2023-10-27 PROCEDURE — 84484 ASSAY OF TROPONIN QUANT: CPT

## 2023-10-27 PROCEDURE — 93005 ELECTROCARDIOGRAM TRACING: CPT | Performed by: EMERGENCY MEDICINE

## 2023-10-27 PROCEDURE — 80053 COMPREHEN METABOLIC PANEL: CPT

## 2023-10-27 PROCEDURE — C9113 INJ PANTOPRAZOLE SODIUM, VIA: HCPCS | Performed by: EMERGENCY MEDICINE

## 2023-10-27 PROCEDURE — 93010 ELECTROCARDIOGRAM REPORT: CPT | Performed by: INTERNAL MEDICINE

## 2023-10-27 PROCEDURE — 81015 MICROSCOPIC EXAM OF URINE: CPT

## 2023-10-27 PROCEDURE — 99285 EMERGENCY DEPT VISIT HI MDM: CPT

## 2023-10-27 PROCEDURE — 6360000002 HC RX W HCPCS: Performed by: EMERGENCY MEDICINE

## 2023-10-27 PROCEDURE — 96374 THER/PROPH/DIAG INJ IV PUSH: CPT

## 2023-10-27 PROCEDURE — 70450 CT HEAD/BRAIN W/O DYE: CPT

## 2023-10-27 PROCEDURE — A4216 STERILE WATER/SALINE, 10 ML: HCPCS | Performed by: EMERGENCY MEDICINE

## 2023-10-27 PROCEDURE — 6370000000 HC RX 637 (ALT 250 FOR IP): Performed by: HOSPITALIST

## 2023-10-27 PROCEDURE — 6370000000 HC RX 637 (ALT 250 FOR IP): Performed by: EMERGENCY MEDICINE

## 2023-10-27 RX ORDER — ASPIRIN 81 MG/1
324 TABLET, CHEWABLE ORAL ONCE
Status: COMPLETED | OUTPATIENT
Start: 2023-10-27 | End: 2023-10-27

## 2023-10-27 RX ORDER — ONDANSETRON 2 MG/ML
4 INJECTION INTRAMUSCULAR; INTRAVENOUS EVERY 6 HOURS PRN
Status: DISCONTINUED | OUTPATIENT
Start: 2023-10-27 | End: 2023-10-31 | Stop reason: HOSPADM

## 2023-10-27 RX ORDER — LABETALOL HYDROCHLORIDE 5 MG/ML
10 INJECTION, SOLUTION INTRAVENOUS EVERY 6 HOURS PRN
Status: DISCONTINUED | OUTPATIENT
Start: 2023-10-27 | End: 2023-10-31 | Stop reason: HOSPADM

## 2023-10-27 RX ORDER — PRAVASTATIN SODIUM 20 MG
20 TABLET ORAL NIGHTLY
Status: DISCONTINUED | OUTPATIENT
Start: 2023-10-27 | End: 2023-10-28

## 2023-10-27 RX ORDER — SODIUM CHLORIDE 9 MG/ML
INJECTION, SOLUTION INTRAVENOUS PRN
Status: DISCONTINUED | OUTPATIENT
Start: 2023-10-27 | End: 2023-10-31 | Stop reason: HOSPADM

## 2023-10-27 RX ORDER — ASPIRIN 300 MG/1
300 SUPPOSITORY RECTAL DAILY
Status: DISCONTINUED | OUTPATIENT
Start: 2023-10-28 | End: 2023-10-31 | Stop reason: HOSPADM

## 2023-10-27 RX ORDER — SODIUM CHLORIDE 0.9 % (FLUSH) 0.9 %
10 SYRINGE (ML) INJECTION
Status: COMPLETED | OUTPATIENT
Start: 2023-10-27 | End: 2023-10-27

## 2023-10-27 RX ORDER — POLYETHYLENE GLYCOL 3350 17 G/17G
17 POWDER, FOR SOLUTION ORAL DAILY PRN
Status: DISCONTINUED | OUTPATIENT
Start: 2023-10-27 | End: 2023-10-31 | Stop reason: HOSPADM

## 2023-10-27 RX ORDER — LABETALOL HYDROCHLORIDE 5 MG/ML
10 INJECTION, SOLUTION INTRAVENOUS ONCE
Status: COMPLETED | OUTPATIENT
Start: 2023-10-27 | End: 2023-10-27

## 2023-10-27 RX ORDER — SODIUM CHLORIDE 0.9 % (FLUSH) 0.9 %
5-40 SYRINGE (ML) INJECTION PRN
Status: DISCONTINUED | OUTPATIENT
Start: 2023-10-27 | End: 2023-10-31 | Stop reason: HOSPADM

## 2023-10-27 RX ORDER — SODIUM CHLORIDE 0.9 % (FLUSH) 0.9 %
5-40 SYRINGE (ML) INJECTION EVERY 12 HOURS SCHEDULED
Status: DISCONTINUED | OUTPATIENT
Start: 2023-10-27 | End: 2023-10-31 | Stop reason: HOSPADM

## 2023-10-27 RX ORDER — MORPHINE SULFATE 4 MG/ML
2 INJECTION INTRAVENOUS ONCE
Status: COMPLETED | OUTPATIENT
Start: 2023-10-27 | End: 2023-10-27

## 2023-10-27 RX ORDER — 0.9 % SODIUM CHLORIDE 0.9 %
100 INTRAVENOUS SOLUTION INTRAVENOUS ONCE
Status: DISCONTINUED | OUTPATIENT
Start: 2023-10-27 | End: 2023-10-31 | Stop reason: HOSPADM

## 2023-10-27 RX ORDER — ONDANSETRON 4 MG/1
4 TABLET, ORALLY DISINTEGRATING ORAL EVERY 8 HOURS PRN
Status: DISCONTINUED | OUTPATIENT
Start: 2023-10-27 | End: 2023-10-31 | Stop reason: HOSPADM

## 2023-10-27 RX ORDER — ASPIRIN 81 MG/1
81 TABLET, CHEWABLE ORAL DAILY
Status: DISCONTINUED | OUTPATIENT
Start: 2023-10-28 | End: 2023-10-31 | Stop reason: HOSPADM

## 2023-10-27 RX ADMIN — LABETALOL HYDROCHLORIDE 10 MG: 5 INJECTION INTRAVENOUS at 19:40

## 2023-10-27 RX ADMIN — PRAVASTATIN SODIUM 20 MG: 20 TABLET ORAL at 21:52

## 2023-10-27 RX ADMIN — IOPAMIDOL 100 ML: 755 INJECTION, SOLUTION INTRAVENOUS at 16:09

## 2023-10-27 RX ADMIN — SODIUM CHLORIDE 40 MG: 9 INJECTION INTRAMUSCULAR; INTRAVENOUS; SUBCUTANEOUS at 19:25

## 2023-10-27 RX ADMIN — SODIUM CHLORIDE, PRESERVATIVE FREE 10 ML: 5 INJECTION INTRAVENOUS at 16:17

## 2023-10-27 RX ADMIN — LABETALOL HYDROCHLORIDE 10 MG: 5 INJECTION INTRAVENOUS at 16:44

## 2023-10-27 RX ADMIN — MORPHINE SULFATE 2 MG: 4 INJECTION, SOLUTION INTRAMUSCULAR; INTRAVENOUS at 16:44

## 2023-10-27 RX ADMIN — ASPIRIN 324 MG: 81 TABLET, CHEWABLE ORAL at 19:24

## 2023-10-27 ASSESSMENT — PAIN SCALES - GENERAL
PAINLEVEL_OUTOF10: 0
PAINLEVEL_OUTOF10: 4
PAINLEVEL_OUTOF10: 0

## 2023-10-27 ASSESSMENT — PAIN DESCRIPTION - LOCATION: LOCATION: CHEST

## 2023-10-27 ASSESSMENT — PAIN - FUNCTIONAL ASSESSMENT: PAIN_FUNCTIONAL_ASSESSMENT: NONE - DENIES PAIN

## 2023-10-27 NOTE — ED NOTES
Pt states new onset of chest pain in the middle of the chest. Pt states the chest pain does not radiate anywhere. MD aware. Pt is on monitor.      Eileen Rangel RN  10/27/23 8849

## 2023-10-27 NOTE — ED TRIAGE NOTES
Pt CO slurred speech and L lower extremity numbness since 0200. Pt denies previous strokes. MD notified.

## 2023-10-27 NOTE — ED PROVIDER NOTES
administered intravenously, without   adverse reaction. Coronal and sagittal MIP and MPR images were generated. CT   dose lowering techniques were used, to include: automated exposure control,   adjustment for patient size, and or use of iterative reconstruction. Stenoses   measured based on NASCET criteria. FINDINGS:      CT head:    Intracranial contents: The ventricles and sulci are moderately enlarged. There is no midline shift or   mass effect. There is no abnormal extra-axial fluid collection or acute   intracranial hemorrhage. Moderate patchy and confluent low density in the cerebral white matter. Gray-white interface appears distinct. Bones and extracranial soft tissues: The calvarium is intact. Subtotal opacification of the right maxillary sinus,   with high density material centrally and hyperostosis of the sinus walls. The   remainder of the visualized paranasal sinuses appear clear. CTA neck:    Arch and great vessels:    Mild to moderate atherosclerotic calcification at the aortic arch and moderate   atherosclerosis at the left subclavian artery origin, with up to 30% stenosis   there. Noncalcified atheromatous plaque in the proximal left subclavian artery   results in up to 50% stenosis there. Right carotid:    Common carotid artery is patent. Mixed calcified and noncalcified atheromatous   plaque at the carotid bifurcation and carotid bulb results in up to 60% stenosis   at the carotid bulb. Contour and caliber irregularity of the mid and distal   ICA, with short segments of relative ectasia and narrowing of the lumen in   tandem, particularly evident on the coronal and sagittal images. Left carotid:    Minimal atherosclerotic contour irregularity of the common carotid artery,   without occlusion or significant stenosis. Atherosclerosis at the carotid   bifurcation and carotid bulb, without occlusion or significant stenosis.  Contour   and caliber irregularity of the mid no

## 2023-10-28 ENCOUNTER — APPOINTMENT (OUTPATIENT)
Dept: NON INVASIVE DIAGNOSTICS | Age: 88
DRG: 066 | End: 2023-10-28
Attending: HOSPITALIST
Payer: MEDICARE

## 2023-10-28 ENCOUNTER — APPOINTMENT (OUTPATIENT)
Dept: MRI IMAGING | Age: 88
DRG: 066 | End: 2023-10-28
Payer: MEDICARE

## 2023-10-28 PROBLEM — I63.9 ACUTE ISCHEMIC STROKE (HCC): Status: ACTIVE | Noted: 2023-10-28

## 2023-10-28 LAB
CHOLEST SERPL-MCNC: 211 MG/DL
ECHO AO ASC DIAM: 2.9 CM
ECHO AO ASCENDING AORTA INDEX: 1.87 CM/M2
ECHO AO ROOT DIAM: 2.7 CM
ECHO AO ROOT INDEX: 1.74 CM/M2
ECHO AV AREA PEAK VELOCITY: 1.7 CM2
ECHO AV AREA VTI: 1.5 CM2
ECHO AV AREA/BSA PEAK VELOCITY: 1.1 CM2/M2
ECHO AV AREA/BSA VTI: 1 CM2/M2
ECHO AV MEAN GRADIENT: 7 MMHG
ECHO AV MEAN VELOCITY: 1.2 M/S
ECHO AV PEAK GRADIENT: 11 MMHG
ECHO AV PEAK VELOCITY: 1.7 M/S
ECHO AV VELOCITY RATIO: 0.53
ECHO AV VTI: 43.6 CM
ECHO BSA: 1.57 M2
ECHO EST RA PRESSURE: 3 MMHG
ECHO IVC EXP: 1.2 CM
ECHO LA AREA 2C: 20 CM2
ECHO LA AREA 4C: 16.1 CM2
ECHO LA DIAMETER INDEX: 1.74 CM/M2
ECHO LA DIAMETER: 2.7 CM
ECHO LA MAJOR AXIS: 5.6 CM
ECHO LA MINOR AXIS: 5.5 CM
ECHO LA TO AORTIC ROOT RATIO: 1
ECHO LA VOL 2C: 61 ML (ref 22–52)
ECHO LA VOL 4C: 38 ML (ref 22–52)
ECHO LA VOL BP: 48 ML (ref 22–52)
ECHO LA VOL/BSA BIPLANE: 31 ML/M2 (ref 16–34)
ECHO LA VOLUME INDEX A2C: 39 ML/M2 (ref 16–34)
ECHO LA VOLUME INDEX A4C: 25 ML/M2 (ref 16–34)
ECHO LV E' LATERAL VELOCITY: 9 CM/S
ECHO LV E' SEPTAL VELOCITY: 6 CM/S
ECHO LV EDV A2C: 67 ML
ECHO LV EDV A4C: 52 ML
ECHO LV EDV INDEX A4C: 34 ML/M2
ECHO LV EDV NDEX A2C: 43 ML/M2
ECHO LV EJECTION FRACTION A2C: 65 %
ECHO LV EJECTION FRACTION A4C: 60 %
ECHO LV EJECTION FRACTION BIPLANE: 64 % (ref 55–100)
ECHO LV ESV A2C: 23 ML
ECHO LV ESV A4C: 20 ML
ECHO LV ESV INDEX A2C: 15 ML/M2
ECHO LV ESV INDEX A4C: 13 ML/M2
ECHO LV FRACTIONAL SHORTENING: 44 % (ref 28–44)
ECHO LV INTERNAL DIMENSION DIASTOLE INDEX: 2.65 CM/M2
ECHO LV INTERNAL DIMENSION DIASTOLIC: 4.1 CM (ref 3.9–5.3)
ECHO LV INTERNAL DIMENSION SYSTOLIC INDEX: 1.48 CM/M2
ECHO LV INTERNAL DIMENSION SYSTOLIC: 2.3 CM
ECHO LV IVSD: 0.9 CM (ref 0.6–0.9)
ECHO LV MASS 2D: 105.6 G (ref 67–162)
ECHO LV MASS INDEX 2D: 68.1 G/M2 (ref 43–95)
ECHO LV POSTERIOR WALL DIASTOLIC: 0.8 CM (ref 0.6–0.9)
ECHO LV RELATIVE WALL THICKNESS RATIO: 0.39
ECHO LVOT AREA: 3.1 CM2
ECHO LVOT AV VTI INDEX: 0.49
ECHO LVOT DIAM: 2 CM
ECHO LVOT MEAN GRADIENT: 2 MMHG
ECHO LVOT PEAK GRADIENT: 3 MMHG
ECHO LVOT PEAK VELOCITY: 0.9 M/S
ECHO LVOT STROKE VOLUME INDEX: 43.1 ML/M2
ECHO LVOT SV: 66.9 ML
ECHO LVOT VTI: 21.3 CM
ECHO MV A VELOCITY: 0.99 M/S
ECHO MV E DECELERATION TIME (DT): 219 MS
ECHO MV E VELOCITY: 1.02 M/S
ECHO MV E/A RATIO: 1.03
ECHO MV E/E' LATERAL: 11.33
ECHO MV E/E' RATIO (AVERAGED): 14.17
ECHO MV E/E' SEPTAL: 17
ECHO PV ACCELERATION TIME (AT): 140 MS
ECHO PV MAX VELOCITY: 0.9 M/S
ECHO PV PEAK GRADIENT: 3 MMHG
ECHO RIGHT VENTRICULAR SYSTOLIC PRESSURE (RVSP): 26 MMHG
ECHO RV BASAL DIMENSION: 3.1 CM
ECHO RV FREE WALL PEAK S': 12 CM/S
ECHO RV INTERNAL DIMENSION: 2.5 CM
ECHO RV TAPSE: 1.8 CM (ref 1.7–?)
ECHO TV REGURGITANT MAX VELOCITY: 2.39 M/S
ECHO TV REGURGITANT PEAK GRADIENT: 23 MMHG
ERYTHROCYTE [DISTWIDTH] IN BLOOD BY AUTOMATED COUNT: 12.9 % (ref 11.9–14.6)
EST. AVERAGE GLUCOSE BLD GHB EST-MCNC: 166 MG/DL
GLUCOSE BLD STRIP.AUTO-MCNC: 229 MG/DL (ref 65–100)
HBA1C MFR BLD: 7.4 % (ref 4.8–5.6)
HCT VFR BLD AUTO: 42.7 % (ref 35.8–46.3)
HDLC SERPL-MCNC: 45 MG/DL (ref 40–60)
HDLC SERPL: 4.7
HGB BLD-MCNC: 14.2 G/DL (ref 11.7–15.4)
LDLC SERPL CALC-MCNC: 133.4 MG/DL
MCH RBC QN AUTO: 30 PG (ref 26.1–32.9)
MCHC RBC AUTO-ENTMCNC: 33.3 G/DL (ref 31.4–35)
MCV RBC AUTO: 90.3 FL (ref 82–102)
NRBC # BLD: 0 K/UL (ref 0–0.2)
PLATELET # BLD AUTO: 227 K/UL (ref 150–450)
PMV BLD AUTO: 10 FL (ref 9.4–12.3)
RBC # BLD AUTO: 4.73 M/UL (ref 4.05–5.2)
SERVICE CMNT-IMP: ABNORMAL
TRIGL SERPL-MCNC: 163 MG/DL (ref 35–150)
TROPONIN I SERPL HS-MCNC: 49.1 PG/ML (ref 0–14)
VLDLC SERPL CALC-MCNC: 32.6 MG/DL (ref 6–23)
WBC # BLD AUTO: 5.9 K/UL (ref 4.3–11.1)

## 2023-10-28 PROCEDURE — 2580000003 HC RX 258: Performed by: HOSPITALIST

## 2023-10-28 PROCEDURE — 1100000003 HC PRIVATE W/ TELEMETRY

## 2023-10-28 PROCEDURE — 93306 TTE W/DOPPLER COMPLETE: CPT | Performed by: INTERNAL MEDICINE

## 2023-10-28 PROCEDURE — 83036 HEMOGLOBIN GLYCOSYLATED A1C: CPT

## 2023-10-28 PROCEDURE — 6370000000 HC RX 637 (ALT 250 FOR IP): Performed by: HOSPITALIST

## 2023-10-28 PROCEDURE — 97165 OT EVAL LOW COMPLEX 30 MIN: CPT

## 2023-10-28 PROCEDURE — 85027 COMPLETE CBC AUTOMATED: CPT

## 2023-10-28 PROCEDURE — 97535 SELF CARE MNGMENT TRAINING: CPT

## 2023-10-28 PROCEDURE — 70551 MRI BRAIN STEM W/O DYE: CPT

## 2023-10-28 PROCEDURE — 97161 PT EVAL LOW COMPLEX 20 MIN: CPT

## 2023-10-28 PROCEDURE — 97530 THERAPEUTIC ACTIVITIES: CPT

## 2023-10-28 PROCEDURE — 2500000003 HC RX 250 WO HCPCS: Performed by: HOSPITALIST

## 2023-10-28 PROCEDURE — 36415 COLL VENOUS BLD VENIPUNCTURE: CPT

## 2023-10-28 PROCEDURE — 80061 LIPID PANEL: CPT

## 2023-10-28 PROCEDURE — 82962 GLUCOSE BLOOD TEST: CPT

## 2023-10-28 PROCEDURE — 6360000002 HC RX W HCPCS: Performed by: HOSPITALIST

## 2023-10-28 PROCEDURE — 93306 TTE W/DOPPLER COMPLETE: CPT

## 2023-10-28 RX ORDER — DEXTROSE MONOHYDRATE 100 MG/ML
INJECTION, SOLUTION INTRAVENOUS CONTINUOUS PRN
Status: DISCONTINUED | OUTPATIENT
Start: 2023-10-28 | End: 2023-10-31 | Stop reason: HOSPADM

## 2023-10-28 RX ORDER — ENOXAPARIN SODIUM 100 MG/ML
40 INJECTION SUBCUTANEOUS EVERY 24 HOURS
Status: DISCONTINUED | OUTPATIENT
Start: 2023-10-28 | End: 2023-10-31 | Stop reason: HOSPADM

## 2023-10-28 RX ORDER — INSULIN LISPRO 100 [IU]/ML
0-4 INJECTION, SOLUTION INTRAVENOUS; SUBCUTANEOUS NIGHTLY
Status: DISCONTINUED | OUTPATIENT
Start: 2023-10-28 | End: 2023-10-31 | Stop reason: HOSPADM

## 2023-10-28 RX ORDER — INSULIN LISPRO 100 [IU]/ML
0-8 INJECTION, SOLUTION INTRAVENOUS; SUBCUTANEOUS
Status: DISCONTINUED | OUTPATIENT
Start: 2023-10-28 | End: 2023-10-31 | Stop reason: HOSPADM

## 2023-10-28 RX ORDER — PANTOPRAZOLE SODIUM 40 MG/1
40 TABLET, DELAYED RELEASE ORAL
Status: DISCONTINUED | OUTPATIENT
Start: 2023-10-28 | End: 2023-10-31 | Stop reason: HOSPADM

## 2023-10-28 RX ORDER — PRAVASTATIN SODIUM 20 MG
40 TABLET ORAL NIGHTLY
Status: DISCONTINUED | OUTPATIENT
Start: 2023-10-28 | End: 2023-10-31 | Stop reason: HOSPADM

## 2023-10-28 RX ORDER — IBUPROFEN 600 MG/1
1 TABLET ORAL PRN
Status: DISCONTINUED | OUTPATIENT
Start: 2023-10-28 | End: 2023-10-31 | Stop reason: HOSPADM

## 2023-10-28 RX ADMIN — SODIUM CHLORIDE, PRESERVATIVE FREE 10 ML: 5 INJECTION INTRAVENOUS at 23:56

## 2023-10-28 RX ADMIN — PANTOPRAZOLE SODIUM 40 MG: 40 TABLET, DELAYED RELEASE ORAL at 23:46

## 2023-10-28 RX ADMIN — ASPIRIN 81 MG: 81 TABLET, CHEWABLE ORAL at 08:32

## 2023-10-28 RX ADMIN — PRAVASTATIN SODIUM 40 MG: 20 TABLET ORAL at 23:42

## 2023-10-28 RX ADMIN — ENOXAPARIN SODIUM 40 MG: 100 INJECTION SUBCUTANEOUS at 15:45

## 2023-10-28 RX ADMIN — TUBERCULIN PURIFIED PROTEIN DERIVATIVE 5 UNITS: 5 INJECTION, SOLUTION INTRADERMAL at 15:45

## 2023-10-28 NOTE — ED NOTES
TRANSFER - OUT REPORT:    Verbal report given to Cris on [address]  being transferred to  for routine progression of patient care       Report consisted of patient's Situation, Background, Assessment and   Recommendations(SBAR). Information from the following report(s) ED Encounter Summary was reviewed with the receiving nurse. Lines:   Peripheral IV Left Antecubital (Active)   Phlebitis Assessment Pain at access site with erythema and/or edema streak formation, palpable venous cord greater than 1 inch in length, purulent drainage 10/27/23 1533        Opportunity for questions and clarification was provided.       Patient transported with:  Registered Nurse      Chai Cardenas RN  10/27/23 2017

## 2023-10-28 NOTE — CARE COORDINATION
10/28/23 1006   Service Assessment   Patient Orientation Alert and Oriented   Cognition Alert   History Provided By Patient   Primary Caregiver Self   Support Systems Friends/Neighbors; Children;Family Members   Patient's [address] is: Legal Next of [address] [facility]   PCP Verified by CM Yes   Last Visit to PCP Within last 6 months   Prior Functional Level Assistance with the following:   Current Functional Level Assistance with the following:   Can patient return to prior living arrangement Yes   Ability to make needs known: Good   Family able to assist with home care needs: No   Would you like for me to discuss the discharge plan with any other family members/significant others, and if so, who? No   Financial Resources Mr Banana None   Social/Functional History   Lives With Alone   Condition of Participation: Discharge Planning   The Plan for Transition of Care is related to the following treatment goals: return home alone   The Patient and/or Patient Representative was provided with a Choice of Provider? Patient   The Patient and/Or Patient Representative agree with the Discharge Plan? Yes   Freedom of Choice list was provided with basic dialogue that supports the patient's individualized plan of care/goals, treatment preferences, and shares the quality data associated with the providers? Yes     Assessment completed with patient with patient in room. Patient lives alone with friend support. Patient is legally blind. Friends assist with transportation, meals, medications. Demographics and PCP confirmed. CM following for discharge needs.     Darryle Pringle LBSW, [place] [place]

## 2023-10-28 NOTE — H&P
9/11/2014    Edema 9/11/2014    Esophageal dysmotility 9/12/2014    Hypercholesterolemia     Hypertension 9/11/2014    Other ill-defined conditions(799.89)     high cholesterol    Shingles        Past Surgical History:   Procedure Laterality Date    APPENDECTOMY      incidental    COLONOSCOPY  10/03    COLONOSCOPY N/A 1/30/2023    COLONOSCOPY DIAGNOSTIC performed by Juve Reilly MD at [address] [facility])      UPPER GASTROINTESTINAL ENDOSCOPY N/A 1/30/2023    EGD BIOPSY performed by Juve Reilly MD at [address] [facility] History     Tobacco Use    Smoking status: Never    Smokeless tobacco: Never   Substance Use Topics    Alcohol use: Yes      Social History     Substance and Sexual Activity   Drug Use No       Family History   Problem Relation Age of Onset    Breast Cancer Mother 36        Immunization History   Administered Date(s) Administered    COVID-19, MODERNA BLUE border, Primary or Immunocompromised, (age 12y+), IM, 100 mcg/0.5mL 12/31/2020, 01/28/2021    COVID-19, MODERNA Booster BLUE border, (age 18y+), IM, 50mcg/0.25mL 10/23/2021    Hep A-Hep BGayleen Host, (age 18y+), IM, 1mL 08/25/2023    Influenza, FLUAD, (age 72 y+), Adjuvanted, 0.5mL 09/21/2021, 10/12/2022, 08/25/2023    Influenza, High Dose (Fluzone 65 yrs and older) 10/12/2016, 09/18/2017, 10/23/2017, 09/23/2018    Influenza, Triv, inactivated, subunit, adjuvanted, IM (Fluad 65 yrs and older) 08/29/2019    PPD Test 01/27/2023    Pneumococcal, PCV-13, PREVNAR 15, (age 6w+), IM, 0.5mL 07/13/2015    Pneumococcal, PPSV23, PNEUMOVAX 21, (age 2y+), SC/IM, 0.5mL 01/01/2007    TDaP, ADACEL (age 6y-58y), BOOSTRIX (age 10y+), IM, 0.5mL 08/25/2023    Td vaccine (adult) 07/26/2008    Zoster Recombinant (Shingrix) 05/10/2018, 08/05/2018     Allergies   Allergen Reactions    Atorvastatin Other (See Comments)     Muscle aches    Ezetimibe-Simvastatin Other (See Comments)     Hair loss    Penicillins     Sulfa

## 2023-10-29 LAB
ANION GAP SERPL CALC-SCNC: 6 MMOL/L (ref 2–11)
BASOPHILS # BLD: 0 K/UL (ref 0–0.2)
BASOPHILS NFR BLD: 0 % (ref 0–2)
BUN SERPL-MCNC: 14 MG/DL (ref 8–23)
CALCIUM SERPL-MCNC: 9.2 MG/DL (ref 8.3–10.4)
CHLORIDE SERPL-SCNC: 105 MMOL/L (ref 101–110)
CO2 SERPL-SCNC: 29 MMOL/L (ref 21–32)
CREAT SERPL-MCNC: 0.78 MG/DL (ref 0.6–1)
DIFFERENTIAL METHOD BLD: ABNORMAL
EOSINOPHIL # BLD: 0.2 K/UL (ref 0–0.8)
EOSINOPHIL NFR BLD: 3 % (ref 0.5–7.8)
ERYTHROCYTE [DISTWIDTH] IN BLOOD BY AUTOMATED COUNT: 13 % (ref 11.9–14.6)
GLUCOSE BLD STRIP.AUTO-MCNC: 182 MG/DL (ref 65–100)
GLUCOSE BLD STRIP.AUTO-MCNC: 195 MG/DL (ref 65–100)
GLUCOSE BLD STRIP.AUTO-MCNC: 221 MG/DL (ref 65–100)
GLUCOSE BLD STRIP.AUTO-MCNC: 225 MG/DL (ref 65–100)
GLUCOSE SERPL-MCNC: 191 MG/DL (ref 65–100)
HCT VFR BLD AUTO: 42.5 % (ref 35.8–46.3)
HGB BLD-MCNC: 13.9 G/DL (ref 11.7–15.4)
IMM GRANULOCYTES # BLD AUTO: 0 K/UL (ref 0–0.5)
IMM GRANULOCYTES NFR BLD AUTO: 0 % (ref 0–5)
LYMPHOCYTES # BLD: 2.3 K/UL (ref 0.5–4.6)
LYMPHOCYTES NFR BLD: 44 % (ref 13–44)
MCH RBC QN AUTO: 30 PG (ref 26.1–32.9)
MCHC RBC AUTO-ENTMCNC: 32.7 G/DL (ref 31.4–35)
MCV RBC AUTO: 91.6 FL (ref 82–102)
MM INDURATION, POC: 0 MM (ref 0–5)
MONOCYTES # BLD: 0.6 K/UL (ref 0.1–1.3)
MONOCYTES NFR BLD: 12 % (ref 4–12)
NEUTS SEG # BLD: 2.1 K/UL (ref 1.7–8.2)
NEUTS SEG NFR BLD: 40 % (ref 43–78)
NRBC # BLD: 0 K/UL (ref 0–0.2)
PLATELET # BLD AUTO: 231 K/UL (ref 150–450)
PMV BLD AUTO: 10 FL (ref 9.4–12.3)
POTASSIUM SERPL-SCNC: 4.4 MMOL/L (ref 3.5–5.1)
PPD, POC: NEGATIVE
RBC # BLD AUTO: 4.64 M/UL (ref 4.05–5.2)
SERVICE CMNT-IMP: ABNORMAL
SODIUM SERPL-SCNC: 140 MMOL/L (ref 133–143)
WBC # BLD AUTO: 5.2 K/UL (ref 4.3–11.1)

## 2023-10-29 PROCEDURE — 80048 BASIC METABOLIC PNL TOTAL CA: CPT

## 2023-10-29 PROCEDURE — 6370000000 HC RX 637 (ALT 250 FOR IP): Performed by: HOSPITALIST

## 2023-10-29 PROCEDURE — 92610 EVALUATE SWALLOWING FUNCTION: CPT

## 2023-10-29 PROCEDURE — 6360000002 HC RX W HCPCS: Performed by: HOSPITALIST

## 2023-10-29 PROCEDURE — 85025 COMPLETE CBC W/AUTO DIFF WBC: CPT

## 2023-10-29 PROCEDURE — 36415 COLL VENOUS BLD VENIPUNCTURE: CPT

## 2023-10-29 PROCEDURE — 82962 GLUCOSE BLOOD TEST: CPT

## 2023-10-29 PROCEDURE — 1100000003 HC PRIVATE W/ TELEMETRY

## 2023-10-29 PROCEDURE — 2580000003 HC RX 258: Performed by: HOSPITALIST

## 2023-10-29 RX ORDER — CLOPIDOGREL BISULFATE 75 MG/1
75 TABLET ORAL DAILY
Status: DISCONTINUED | OUTPATIENT
Start: 2023-10-30 | End: 2023-10-31 | Stop reason: HOSPADM

## 2023-10-29 RX ORDER — LISINOPRIL 20 MG/1
10 TABLET ORAL DAILY
Status: DISCONTINUED | OUTPATIENT
Start: 2023-10-30 | End: 2023-10-31

## 2023-10-29 RX ORDER — LISINOPRIL 5 MG/1
5 TABLET ORAL DAILY
Status: DISCONTINUED | OUTPATIENT
Start: 2023-10-29 | End: 2023-10-29

## 2023-10-29 RX ORDER — CLOPIDOGREL BISULFATE 75 MG/1
75 TABLET ORAL DAILY
Status: DISCONTINUED | OUTPATIENT
Start: 2023-10-29 | End: 2023-10-29

## 2023-10-29 RX ADMIN — ASPIRIN 81 MG: 81 TABLET, CHEWABLE ORAL at 09:03

## 2023-10-29 RX ADMIN — PANTOPRAZOLE SODIUM 40 MG: 40 TABLET, DELAYED RELEASE ORAL at 06:07

## 2023-10-29 RX ADMIN — ENOXAPARIN SODIUM 40 MG: 100 INJECTION SUBCUTANEOUS at 12:31

## 2023-10-29 RX ADMIN — PRAVASTATIN SODIUM 40 MG: 20 TABLET ORAL at 19:47

## 2023-10-29 RX ADMIN — SODIUM CHLORIDE, PRESERVATIVE FREE 10 ML: 5 INJECTION INTRAVENOUS at 20:20

## 2023-10-29 RX ADMIN — LABETALOL HYDROCHLORIDE 10 MG: 5 INJECTION INTRAVENOUS at 20:20

## 2023-10-29 RX ADMIN — INSULIN LISPRO 2 UNITS: 100 INJECTION, SOLUTION INTRAVENOUS; SUBCUTANEOUS at 12:31

## 2023-10-29 ASSESSMENT — PAIN SCALES - GENERAL: PAINLEVEL_OUTOF10: 0

## 2023-10-29 NOTE — CARE COORDINATION
CM discussed therapy recommendations of home health with patient. Patient agreeable. Home health list given to patient. Patient selected [facility]. Referral sent.      Jesse MEJIA, [facility]

## 2023-10-29 NOTE — PLAN OF CARE
Problem: Discharge Planning  Goal: Discharge to home or other facility with appropriate resources  10/29/2023 1438 by Daisy Humphrey RN  Outcome: Progressing  10/29/2023 0218 by Shiv Ferreira RN  Outcome: Progressing     Problem: Skin/Tissue Integrity  Goal: Absence of new skin breakdown  Description: 1. Monitor for areas of redness and/or skin breakdown  2. Assess vascular access sites hourly  3. Every 4-6 hours minimum:  Change oxygen saturation probe site  4. Every 4-6 hours:  If on nasal continuous positive airway pressure, respiratory therapy assess nares and determine need for appliance change or resting period.   10/29/2023 1438 by Daisy Humphrey RN  Outcome: Progressing  10/29/2023 0218 by Shiv Ferreira RN  Outcome: Progressing     Problem: Safety - Adult  Goal: Free from fall injury  10/29/2023 1438 by Daisy Humphrey RN  Outcome: Progressing  10/29/2023 0218 by Shiv Ferreira RN  Outcome: Progressing     Problem: ABCDS Injury Assessment  Goal: Absence of physical injury  10/29/2023 1438 by Daisy Humphrey RN  Outcome: Progressing  10/29/2023 0218 by Shiv Ferreira RN  Outcome: Progressing

## 2023-10-29 NOTE — PLAN OF CARE
Problem: Discharge Planning  Goal: Discharge to home or other facility with appropriate resources  10/29/2023 0218 by Franc Sumner RN  Outcome: Progressing     Problem: Skin/Tissue Integrity  Goal: Absence of new skin breakdown  Description: 1. Monitor for areas of redness and/or skin breakdown  2. Assess vascular access sites hourly  3. Every 4-6 hours minimum:  Change oxygen saturation probe site  4. Every 4-6 hours:  If on nasal continuous positive airway pressure, respiratory therapy assess nares and determine need for appliance change or resting period.   10/29/2023 0218 by Franc Sumner RN  Outcome: Progressing     Problem: Safety - Adult  Goal: Free from fall injury  10/29/2023 0218 by Franc Sumner RN  Outcome: Progressing     Problem: ABCDS Injury Assessment  Goal: Absence of physical injury  10/29/2023 0218 by Franc Sumner RN  Outcome: Progressing

## 2023-10-30 LAB
ANION GAP SERPL CALC-SCNC: 8 MMOL/L (ref 2–11)
BASOPHILS # BLD: 0 K/UL (ref 0–0.2)
BASOPHILS NFR BLD: 1 % (ref 0–2)
BUN SERPL-MCNC: 17 MG/DL (ref 8–23)
CALCIUM SERPL-MCNC: 9 MG/DL (ref 8.3–10.4)
CHLORIDE SERPL-SCNC: 106 MMOL/L (ref 101–110)
CO2 SERPL-SCNC: 27 MMOL/L (ref 21–32)
CREAT SERPL-MCNC: 0.73 MG/DL (ref 0.6–1)
DIFFERENTIAL METHOD BLD: NORMAL
EOSINOPHIL # BLD: 0.2 K/UL (ref 0–0.8)
EOSINOPHIL NFR BLD: 4 % (ref 0.5–7.8)
ERYTHROCYTE [DISTWIDTH] IN BLOOD BY AUTOMATED COUNT: 12.8 % (ref 11.9–14.6)
GLUCOSE BLD STRIP.AUTO-MCNC: 176 MG/DL (ref 65–100)
GLUCOSE BLD STRIP.AUTO-MCNC: 178 MG/DL (ref 65–100)
GLUCOSE BLD STRIP.AUTO-MCNC: 197 MG/DL (ref 65–100)
GLUCOSE BLD STRIP.AUTO-MCNC: 205 MG/DL (ref 65–100)
GLUCOSE SERPL-MCNC: 207 MG/DL (ref 65–100)
HCT VFR BLD AUTO: 43.2 % (ref 35.8–46.3)
HGB BLD-MCNC: 14.5 G/DL (ref 11.7–15.4)
IMM GRANULOCYTES # BLD AUTO: 0 K/UL (ref 0–0.5)
IMM GRANULOCYTES NFR BLD AUTO: 0 % (ref 0–5)
LYMPHOCYTES # BLD: 2.1 K/UL (ref 0.5–4.6)
LYMPHOCYTES NFR BLD: 38 % (ref 13–44)
MCH RBC QN AUTO: 30.1 PG (ref 26.1–32.9)
MCHC RBC AUTO-ENTMCNC: 33.6 G/DL (ref 31.4–35)
MCV RBC AUTO: 89.8 FL (ref 82–102)
MM INDURATION, POC: 0 MM (ref 0–5)
MONOCYTES # BLD: 0.6 K/UL (ref 0.1–1.3)
MONOCYTES NFR BLD: 11 % (ref 4–12)
NEUTS SEG # BLD: 2.6 K/UL (ref 1.7–8.2)
NEUTS SEG NFR BLD: 47 % (ref 43–78)
NRBC # BLD: 0 K/UL (ref 0–0.2)
PLATELET # BLD AUTO: 230 K/UL (ref 150–450)
PMV BLD AUTO: 10.1 FL (ref 9.4–12.3)
POTASSIUM SERPL-SCNC: 4.2 MMOL/L (ref 3.5–5.1)
PPD, POC: NEGATIVE
RBC # BLD AUTO: 4.81 M/UL (ref 4.05–5.2)
SARS-COV-2 RDRP RESP QL NAA+PROBE: NOT DETECTED
SERVICE CMNT-IMP: ABNORMAL
SODIUM SERPL-SCNC: 141 MMOL/L (ref 133–143)
SOURCE: NORMAL
WBC # BLD AUTO: 5.6 K/UL (ref 4.3–11.1)

## 2023-10-30 PROCEDURE — 2580000003 HC RX 258: Performed by: HOSPITALIST

## 2023-10-30 PROCEDURE — 6360000002 HC RX W HCPCS: Performed by: HOSPITALIST

## 2023-10-30 PROCEDURE — 85025 COMPLETE CBC W/AUTO DIFF WBC: CPT

## 2023-10-30 PROCEDURE — 97530 THERAPEUTIC ACTIVITIES: CPT

## 2023-10-30 PROCEDURE — 87635 SARS-COV-2 COVID-19 AMP PRB: CPT

## 2023-10-30 PROCEDURE — 1100000003 HC PRIVATE W/ TELEMETRY

## 2023-10-30 PROCEDURE — 36415 COLL VENOUS BLD VENIPUNCTURE: CPT

## 2023-10-30 PROCEDURE — 82962 GLUCOSE BLOOD TEST: CPT

## 2023-10-30 PROCEDURE — 80048 BASIC METABOLIC PNL TOTAL CA: CPT

## 2023-10-30 PROCEDURE — 6370000000 HC RX 637 (ALT 250 FOR IP): Performed by: HOSPITALIST

## 2023-10-30 PROCEDURE — 6370000000 HC RX 637 (ALT 250 FOR IP): Performed by: FAMILY MEDICINE

## 2023-10-30 RX ADMIN — SODIUM CHLORIDE, PRESERVATIVE FREE 10 ML: 5 INJECTION INTRAVENOUS at 09:16

## 2023-10-30 RX ADMIN — LISINOPRIL 10 MG: 20 TABLET ORAL at 09:15

## 2023-10-30 RX ADMIN — PANTOPRAZOLE SODIUM 40 MG: 40 TABLET, DELAYED RELEASE ORAL at 05:55

## 2023-10-30 RX ADMIN — CLOPIDOGREL BISULFATE 75 MG: 75 TABLET ORAL at 09:15

## 2023-10-30 RX ADMIN — INSULIN LISPRO 2 UNITS: 100 INJECTION, SOLUTION INTRAVENOUS; SUBCUTANEOUS at 09:16

## 2023-10-30 RX ADMIN — SODIUM CHLORIDE, PRESERVATIVE FREE 10 ML: 5 INJECTION INTRAVENOUS at 20:26

## 2023-10-30 RX ADMIN — LABETALOL HYDROCHLORIDE 10 MG: 5 INJECTION INTRAVENOUS at 21:00

## 2023-10-30 RX ADMIN — PRAVASTATIN SODIUM 40 MG: 20 TABLET ORAL at 20:26

## 2023-10-30 RX ADMIN — ENOXAPARIN SODIUM 40 MG: 100 INJECTION SUBCUTANEOUS at 14:51

## 2023-10-30 RX ADMIN — ASPIRIN 81 MG: 81 TABLET, CHEWABLE ORAL at 09:15

## 2023-10-30 RX ADMIN — METFORMIN HYDROCHLORIDE 500 MG: 500 TABLET ORAL at 09:15

## 2023-10-30 NOTE — CARE COORDINATION
CM chart review; discussed in IDT rounds. CM spoke w/ son at bedside. Patient lives alone and son does not think patient is ready to return home alone at this time. Son lives in [place]. They are interested in [place] and would like referral to [facility]. Referral sent. CM also provided him with choice list of other facilities in the area to review. Patient will need 3 night inpatient stay; earliest dc would be tomorrow. PPD has been placed. CM will follow. Update 1425: Patient has been accepted at [facility] for [place]. Bed will be available tomorrow afternoon.  Liaison requests transport around 2pm.

## 2023-10-31 VITALS
HEART RATE: 71 BPM | WEIGHT: 126.98 LBS | SYSTOLIC BLOOD PRESSURE: 137 MMHG | RESPIRATION RATE: 16 BRPM | TEMPERATURE: 98 F | HEIGHT: 61 IN | BODY MASS INDEX: 23.98 KG/M2 | DIASTOLIC BLOOD PRESSURE: 64 MMHG | OXYGEN SATURATION: 96 %

## 2023-10-31 LAB
ANION GAP SERPL CALC-SCNC: 7 MMOL/L (ref 2–11)
BASOPHILS # BLD: 0 K/UL (ref 0–0.2)
BASOPHILS NFR BLD: 1 % (ref 0–2)
BUN SERPL-MCNC: 19 MG/DL (ref 8–23)
CALCIUM SERPL-MCNC: 9 MG/DL (ref 8.3–10.4)
CHLORIDE SERPL-SCNC: 105 MMOL/L (ref 101–110)
CO2 SERPL-SCNC: 27 MMOL/L (ref 21–32)
CREAT SERPL-MCNC: 0.89 MG/DL (ref 0.6–1)
DIFFERENTIAL METHOD BLD: NORMAL
EOSINOPHIL # BLD: 0.2 K/UL (ref 0–0.8)
EOSINOPHIL NFR BLD: 3 % (ref 0.5–7.8)
ERYTHROCYTE [DISTWIDTH] IN BLOOD BY AUTOMATED COUNT: 13.1 % (ref 11.9–14.6)
GLUCOSE BLD STRIP.AUTO-MCNC: 163 MG/DL (ref 65–100)
GLUCOSE BLD STRIP.AUTO-MCNC: 241 MG/DL (ref 65–100)
GLUCOSE SERPL-MCNC: 183 MG/DL (ref 65–100)
HCT VFR BLD AUTO: 41.6 % (ref 35.8–46.3)
HGB BLD-MCNC: 13.7 G/DL (ref 11.7–15.4)
IMM GRANULOCYTES # BLD AUTO: 0 K/UL (ref 0–0.5)
IMM GRANULOCYTES NFR BLD AUTO: 0 % (ref 0–5)
LYMPHOCYTES # BLD: 2.5 K/UL (ref 0.5–4.6)
LYMPHOCYTES NFR BLD: 38 % (ref 13–44)
MCH RBC QN AUTO: 29.9 PG (ref 26.1–32.9)
MCHC RBC AUTO-ENTMCNC: 32.9 G/DL (ref 31.4–35)
MCV RBC AUTO: 90.8 FL (ref 82–102)
MONOCYTES # BLD: 0.6 K/UL (ref 0.1–1.3)
MONOCYTES NFR BLD: 10 % (ref 4–12)
NEUTS SEG # BLD: 3.1 K/UL (ref 1.7–8.2)
NEUTS SEG NFR BLD: 49 % (ref 43–78)
NRBC # BLD: 0 K/UL (ref 0–0.2)
PLATELET # BLD AUTO: 245 K/UL (ref 150–450)
PMV BLD AUTO: 10.4 FL (ref 9.4–12.3)
POTASSIUM SERPL-SCNC: 4.4 MMOL/L (ref 3.5–5.1)
RBC # BLD AUTO: 4.58 M/UL (ref 4.05–5.2)
SERVICE CMNT-IMP: ABNORMAL
SERVICE CMNT-IMP: ABNORMAL
SODIUM SERPL-SCNC: 139 MMOL/L (ref 133–143)
WBC # BLD AUTO: 6.4 K/UL (ref 4.3–11.1)

## 2023-10-31 PROCEDURE — 82962 GLUCOSE BLOOD TEST: CPT

## 2023-10-31 PROCEDURE — 2580000003 HC RX 258: Performed by: HOSPITALIST

## 2023-10-31 PROCEDURE — 36415 COLL VENOUS BLD VENIPUNCTURE: CPT

## 2023-10-31 PROCEDURE — 6370000000 HC RX 637 (ALT 250 FOR IP): Performed by: HOSPITALIST

## 2023-10-31 PROCEDURE — 80048 BASIC METABOLIC PNL TOTAL CA: CPT

## 2023-10-31 PROCEDURE — 85025 COMPLETE CBC W/AUTO DIFF WBC: CPT

## 2023-10-31 PROCEDURE — 97530 THERAPEUTIC ACTIVITIES: CPT

## 2023-10-31 RX ORDER — PRAVASTATIN SODIUM 40 MG
40 TABLET ORAL NIGHTLY
Qty: 30 TABLET | Refills: 0
Start: 2023-10-31 | End: 2023-11-30

## 2023-10-31 RX ORDER — LISINOPRIL 20 MG/1
20 TABLET ORAL DAILY
Status: DISCONTINUED | OUTPATIENT
Start: 2023-10-31 | End: 2023-10-31 | Stop reason: HOSPADM

## 2023-10-31 RX ORDER — LISINOPRIL 20 MG/1
20 TABLET ORAL DAILY
Qty: 30 TABLET | Refills: 0
Start: 2023-11-01 | End: 2023-12-01

## 2023-10-31 RX ORDER — CLOPIDOGREL BISULFATE 75 MG/1
75 TABLET ORAL DAILY
Qty: 18 TABLET | Refills: 0
Start: 2023-11-01 | End: 2023-11-19

## 2023-10-31 RX ADMIN — LISINOPRIL 20 MG: 20 TABLET ORAL at 08:53

## 2023-10-31 RX ADMIN — INSULIN LISPRO 2 UNITS: 100 INJECTION, SOLUTION INTRAVENOUS; SUBCUTANEOUS at 11:38

## 2023-10-31 RX ADMIN — CLOPIDOGREL BISULFATE 75 MG: 75 TABLET ORAL at 08:49

## 2023-10-31 RX ADMIN — PANTOPRAZOLE SODIUM 40 MG: 40 TABLET, DELAYED RELEASE ORAL at 05:48

## 2023-10-31 RX ADMIN — ASPIRIN 81 MG: 81 TABLET, CHEWABLE ORAL at 08:49

## 2023-10-31 RX ADMIN — SODIUM CHLORIDE, PRESERVATIVE FREE 10 ML: 5 INJECTION INTRAVENOUS at 08:49

## 2023-10-31 RX ADMIN — METFORMIN HYDROCHLORIDE 500 MG: 500 TABLET ORAL at 08:49

## 2023-10-31 ASSESSMENT — PAIN SCALES - GENERAL
PAINLEVEL_OUTOF10: 0

## 2023-10-31 ASSESSMENT — PAIN - FUNCTIONAL ASSESSMENT: PAIN_FUNCTIONAL_ASSESSMENT: ACTIVITIES ARE NOT PREVENTED

## 2023-10-31 NOTE — DISCHARGE SUMMARY
as \"stop taking\" by the system; but in reality pt or family reported already being off these meds; defer to outpatient/prescribing providers. Procedures done this admission:  * No surgery found *    Consults this admission:  [facility]    Echocardiogram results:  10/27/23    ECHO (TTE) COMPLETE (PRN CONTRAST/BUBBLE/STRAIN/3D) 10/28/2023 10:20 AM (Final)    Interpretation Summary    Left Ventricle: Normal left ventricular systolic function. EF by 2D Simpsons Biplane is 64%. Left ventricle size is normal. Normal wall thickness. Normal wall motion. Abnormal diastolic function. Aortic Valve: Trileaflet valve. Sclerosis of the aortic valve cusp. Mild regurgitation. Tricuspid Valve: The estimated RVSP is 26 mmHg. Aorta: Ao root diameter is 2.7 cm. Ao Root Index is 1.74 cm/m2. Signed by: Vladimir Savage MD on 10/28/2023 10:20 AM      Diagnostic Imaging/Tests:   MRI brain without contrast    Result Date: 10/28/2023  1. Focal subacute ischemic changes involving the right corona radiata with otherwise age-related senescent changes and chronic microangiopathy as well as remote medial left occipital ischemia. 2. Moderate right maxillary sinusitis. CPT code(s) [id number]     CT Head W/O Contrast    Result Date: 10/27/2023  CT HEAD: 1. No acute intracranial abnormality is identified on noncontrast head CT. If there is high clinical suspicion for acute ischemic infarct, MRI may be more sensitive. 2. Moderate generalized brain volume loss and small vessel ischemic changes. 3. Chronic right maxillary sinus inflammatory changes. CTA NECK: 1. 30% stenosis at the left subclavian artery origin and 50% stenosis of the proximal left subclavian artery. 2. 60% stenosis at the right carotid bulb. 3. At least moderate stenosis at the left vertebral artery and moderate stenosis  at the mid V2 segment. 4. Corrugated appearance of the mid and distal cervical ICAs, consistent with fibromuscular dysplasia.  5. Cervical spine

## 2023-10-31 NOTE — CARE COORDINATION
Patient to be discharged to [facility] for [place] today. Rm 301; report to be called to [phone]. CM spoke w/ patient and sonWandy ([phone]); they remain in agreement with discharge plan and requests ambulance transport be arranged. Transport arranged w/ [facility] for 1400. CM will remain available through discharge. 10/31/23 1102   Service Assessment   Cognition Alert   Services At/After Discharge   Transition of Care Consult (CM Consult) SNF   Partner SNF No   Reason Why Partner SNF Not Chosen Friend/family recommendation   [address]charge [address] (SNF)   Mode of Transport at Discharge [address] Time of Discharge 1400   Confirm Follow Up Transport Self   Condition of Participation: Discharge Planning   The Plan for Transition of Care is related to the following treatment goals: Discharge to [facility] for [facility]. The Patient and/or Patient Representative was provided with a Choice of Provider? Patient Representative   Name of the Patient Representative who was provided with the Choice of Provider and agrees with the Discharge Plan? Wandy Servin   The Patient and/Or Patient Representative agree with the Discharge Plan? Yes   Freedom of Choice list was provided with basic dialogue that supports the patient's individualized plan of care/goals, treatment preferences, and shares the quality data associated with the providers?   Yes

## 2023-11-01 ENCOUNTER — CARE COORDINATION (OUTPATIENT)
Dept: CARE COORDINATION | Facility: CLINIC | Age: 88
End: 2023-11-01

## 2023-11-01 NOTE — CARE COORDINATION
Transition of care outreach postponed for 14 days due to patient's discharge to [facility].

## 2023-11-15 ENCOUNTER — CARE COORDINATION (OUTPATIENT)
Dept: CARE COORDINATION | Facility: CLINIC | Age: 88
End: 2023-11-15

## 2023-11-22 ENCOUNTER — CARE COORDINATION (OUTPATIENT)
Dept: CARE COORDINATION | Facility: CLINIC | Age: 88
End: 2023-11-22

## 2023-11-22 NOTE — CARE COORDINATION
Transition of care outreach postponed for 7 days due to patient's discharge to and continued stay at [facility]. Spoke with Bharat Harrison,  states no discharge date at this time.

## 2023-11-29 ENCOUNTER — CARE COORDINATION (OUTPATIENT)
Dept: CARE COORDINATION | Facility: CLINIC | Age: 88
End: 2023-11-29

## 2023-11-29 NOTE — CARE COORDINATION
Transition of care outreach postponed for 1 day due to inability to make contact with SW or discharge staff at [facility] after multiple attempts, call transferred by , constant ringing until automatic disconnect,  no voice mailbox option provided, unable to leave message to request return call. Have attempted to contact patient directly without success. No answer in attempts to contact son David Montgomery or friend Kia Molina with contact information provided in patients contact information and CM notes from hospital stay.

## 2023-11-30 ENCOUNTER — CARE COORDINATION (OUTPATIENT)
Dept: CARE COORDINATION | Facility: CLINIC | Age: 88
End: 2023-11-30

## 2023-11-30 NOTE — CARE COORDINATION
Continues with 30 day stay at [facility]. No discharge date at this time. No further outreach indicated at this time.

## 2023-12-22 DIAGNOSIS — E78.5 HYPERLIPIDEMIA, UNSPECIFIED HYPERLIPIDEMIA TYPE: ICD-10-CM

## 2023-12-22 DIAGNOSIS — E11.9 TYPE 2 DIABETES MELLITUS WITHOUT COMPLICATION, WITHOUT LONG-TERM CURRENT USE OF INSULIN (HCC): ICD-10-CM

## 2023-12-22 PROBLEM — D03.70 MELANOMA IN SITU OF LOWER EXTREMITY (HCC): Status: ACTIVE | Noted: 2023-12-22

## 2023-12-22 PROBLEM — E53.8 VITAMIN B12 DEFICIENCY: Status: ACTIVE | Noted: 2023-12-22

## 2023-12-22 PROBLEM — E55.9 VITAMIN D DEFICIENCY: Status: ACTIVE | Noted: 2023-12-22

## 2023-12-22 PROBLEM — R73.01 IMPAIRED FASTING GLUCOSE: Status: RESOLVED | Noted: 2022-08-23 | Resolved: 2023-12-22

## 2023-12-22 PROBLEM — I63.9 ACUTE ISCHEMIC STROKE (HCC): Status: RESOLVED | Noted: 2023-10-28 | Resolved: 2023-12-22

## 2023-12-22 PROBLEM — K59.01 SLOW TRANSIT CONSTIPATION: Status: ACTIVE | Noted: 2023-11-18

## 2023-12-22 PROBLEM — M54.50 LOW BACK PAIN: Status: ACTIVE | Noted: 2023-11-20

## 2023-12-22 PROBLEM — K92.2 GIB (GASTROINTESTINAL BLEEDING): Status: RESOLVED | Noted: 2023-01-26 | Resolved: 2023-12-22

## 2023-12-22 PROBLEM — M81.0 AGE-RELATED OSTEOPOROSIS WITHOUT CURRENT PATHOLOGICAL FRACTURE: Status: ACTIVE | Noted: 2018-01-23

## 2023-12-22 LAB
25(OH)D3 SERPL-MCNC: 34.8 NG/ML (ref 30–100)
ALBUMIN SERPL-MCNC: 4.5 G/DL (ref 3.2–4.6)
ALBUMIN/GLOB SERPL: 1.6 (ref 0.4–1.6)
ALP SERPL-CCNC: 91 U/L (ref 50–136)
ALT SERPL-CCNC: 25 U/L (ref 12–65)
ANION GAP SERPL CALC-SCNC: 6 MMOL/L (ref 2–11)
AST SERPL-CCNC: 21 U/L (ref 15–37)
BASOPHILS # BLD: 0.1 K/UL (ref 0–0.2)
BASOPHILS NFR BLD: 1 % (ref 0–2)
BILIRUB SERPL-MCNC: 0.4 MG/DL (ref 0.2–1.1)
BUN SERPL-MCNC: 23 MG/DL (ref 8–23)
CALCIUM SERPL-MCNC: 10.8 MG/DL (ref 8.3–10.4)
CHLORIDE SERPL-SCNC: 101 MMOL/L (ref 103–113)
CHOLEST SERPL-MCNC: 202 MG/DL
CO2 SERPL-SCNC: 29 MMOL/L (ref 21–32)
CREAT SERPL-MCNC: 0.8 MG/DL (ref 0.6–1)
DIFFERENTIAL METHOD BLD: ABNORMAL
EOSINOPHIL # BLD: 0.1 K/UL (ref 0–0.8)
EOSINOPHIL NFR BLD: 1 % (ref 0.5–7.8)
ERYTHROCYTE [DISTWIDTH] IN BLOOD BY AUTOMATED COUNT: 13.5 % (ref 11.9–14.6)
GLOBULIN SER CALC-MCNC: 2.8 G/DL (ref 2.8–4.5)
GLUCOSE SERPL-MCNC: 113 MG/DL (ref 65–100)
HCT VFR BLD AUTO: 47.5 % (ref 35.8–46.3)
HDLC SERPL-MCNC: 53 MG/DL (ref 40–60)
HDLC SERPL: 3.8
HGB BLD-MCNC: 15.6 G/DL (ref 11.7–15.4)
IMM GRANULOCYTES # BLD AUTO: 0 K/UL (ref 0–0.5)
IMM GRANULOCYTES NFR BLD AUTO: 0 % (ref 0–5)
LDLC SERPL CALC-MCNC: 118.8 MG/DL
LYMPHOCYTES # BLD: 2 K/UL (ref 0.5–4.6)
LYMPHOCYTES NFR BLD: 21 % (ref 13–44)
MCH RBC QN AUTO: 30.2 PG (ref 26.1–32.9)
MCHC RBC AUTO-ENTMCNC: 32.8 G/DL (ref 31.4–35)
MCV RBC AUTO: 91.9 FL (ref 82–102)
MONOCYTES # BLD: 0.7 K/UL (ref 0.1–1.3)
MONOCYTES NFR BLD: 8 % (ref 4–12)
NEUTS SEG # BLD: 6.6 K/UL (ref 1.7–8.2)
NEUTS SEG NFR BLD: 69 % (ref 43–78)
NRBC # BLD: 0 K/UL (ref 0–0.2)
PLATELET # BLD AUTO: 290 K/UL (ref 150–450)
PMV BLD AUTO: 11.2 FL (ref 9.4–12.3)
POTASSIUM SERPL-SCNC: 4.7 MMOL/L (ref 3.5–5.1)
PROT SERPL-MCNC: 7.3 G/DL (ref 6.3–8.2)
RBC # BLD AUTO: 5.17 M/UL (ref 4.05–5.2)
SODIUM SERPL-SCNC: 136 MMOL/L (ref 136–146)
TRIGL SERPL-MCNC: 151 MG/DL (ref 35–150)
TSH W FREE THYROID IF ABNORMAL: 1.88 UIU/ML (ref 0.36–3.74)
VLDLC SERPL CALC-MCNC: 30.2 MG/DL (ref 6–23)
WBC # BLD AUTO: 9.5 K/UL (ref 4.3–11.1)

## 2023-12-27 DIAGNOSIS — D75.1 ERYTHROCYTOSIS: ICD-10-CM

## 2023-12-27 DIAGNOSIS — E83.52 HYPERCALCEMIA: Primary | ICD-10-CM

## 2023-12-28 ENCOUNTER — TELEPHONE (OUTPATIENT)
Dept: INTERNAL MEDICINE CLINIC | Facility: CLINIC | Age: 88
End: 2023-12-28

## 2023-12-28 RX ORDER — CHOLECALCIFEROL (VITAMIN D3) 125 MCG
1 CAPSULE ORAL DAILY
COMMUNITY
Start: 2023-12-21

## 2023-12-28 RX ORDER — LANCETS 30 GAUGE
1 EACH MISCELLANEOUS DAILY
Qty: 100 EACH | Refills: 11 | Status: SHIPPED | OUTPATIENT
Start: 2023-12-28

## 2023-12-28 RX ORDER — GLUCOSAMINE HCL/CHONDROITIN SU 500-400 MG
CAPSULE ORAL
Qty: 100 STRIP | Refills: 11 | Status: SHIPPED | OUTPATIENT
Start: 2023-12-28

## 2023-12-28 NOTE — TELEPHONE ENCOUNTER
The brand of monitor is [employer]       And Pharmacy is the [facility]

## 2023-12-28 NOTE — TELEPHONE ENCOUNTER
Darron Solis, in order to do this request we must know what brand she is using and what pharmacy she is requesting. I can not forward this to the provider without this information. Thank you.

## 2024-01-03 ENCOUNTER — APPOINTMENT (OUTPATIENT)
Dept: CT IMAGING | Age: 89
End: 2024-01-03
Payer: MEDICARE

## 2024-01-03 ENCOUNTER — HOSPITAL ENCOUNTER (EMERGENCY)
Age: 89
Discharge: HOME | End: 2024-01-03
Attending: EMERGENCY MEDICINE
Payer: MEDICARE

## 2024-01-03 ENCOUNTER — TELEPHONE (OUTPATIENT)
Dept: INTERNAL MEDICINE CLINIC | Facility: CLINIC | Age: 89
End: 2024-01-03

## 2024-01-03 VITALS
WEIGHT: 121 LBS | SYSTOLIC BLOOD PRESSURE: 137 MMHG | TEMPERATURE: 98 F | HEART RATE: 60 BPM | RESPIRATION RATE: 16 BRPM | OXYGEN SATURATION: 99 % | HEIGHT: 61 IN | DIASTOLIC BLOOD PRESSURE: 46 MMHG | BODY MASS INDEX: 22.84 KG/M2

## 2024-01-03 DIAGNOSIS — R06.00 DYSPNEA AND RESPIRATORY ABNORMALITIES: Primary | ICD-10-CM

## 2024-01-03 DIAGNOSIS — R06.89 DYSPNEA AND RESPIRATORY ABNORMALITIES: Primary | ICD-10-CM

## 2024-01-03 LAB
ALBUMIN SERPL-MCNC: 4.1 G/DL (ref 3.2–4.6)
ALBUMIN/GLOB SERPL: 1.4 (ref 0.4–1.6)
ALP SERPL-CCNC: 83 U/L (ref 50–136)
ALT SERPL-CCNC: 23 U/L (ref 12–65)
ANION GAP SERPL CALC-SCNC: 2 MMOL/L (ref 2–11)
AST SERPL-CCNC: 15 U/L (ref 15–37)
BASOPHILS # BLD: 0 K/UL (ref 0–0.2)
BASOPHILS NFR BLD: 1 % (ref 0–2)
BILIRUB SERPL-MCNC: 0.5 MG/DL (ref 0.2–1.1)
BUN SERPL-MCNC: 19 MG/DL (ref 8–23)
CALCIUM SERPL-MCNC: 9.9 MG/DL (ref 8.3–10.4)
CHLORIDE SERPL-SCNC: 103 MMOL/L (ref 103–113)
CO2 SERPL-SCNC: 31 MMOL/L (ref 21–32)
CREAT SERPL-MCNC: 0.79 MG/DL (ref 0.6–1)
DIFFERENTIAL METHOD BLD: NORMAL
EOSINOPHIL # BLD: 0.1 K/UL (ref 0–0.8)
EOSINOPHIL NFR BLD: 2 % (ref 0.5–7.8)
ERYTHROCYTE [DISTWIDTH] IN BLOOD BY AUTOMATED COUNT: 13.4 % (ref 11.9–14.6)
GLOBULIN SER CALC-MCNC: 3 G/DL (ref 2.8–4.5)
GLUCOSE SERPL-MCNC: 154 MG/DL (ref 65–100)
HCT VFR BLD AUTO: 45.6 % (ref 35.8–46.3)
HGB BLD-MCNC: 15.2 G/DL (ref 11.7–15.4)
IMM GRANULOCYTES # BLD AUTO: 0 K/UL (ref 0–0.5)
IMM GRANULOCYTES NFR BLD AUTO: 0 % (ref 0–5)
LYMPHOCYTES # BLD: 2.3 K/UL (ref 0.5–4.6)
LYMPHOCYTES NFR BLD: 40 % (ref 13–44)
MCH RBC QN AUTO: 29.9 PG (ref 26.1–32.9)
MCHC RBC AUTO-ENTMCNC: 33.3 G/DL (ref 31.4–35)
MCV RBC AUTO: 89.8 FL (ref 82–102)
MONOCYTES # BLD: 0.5 K/UL (ref 0.1–1.3)
MONOCYTES NFR BLD: 9 % (ref 4–12)
NEUTS SEG # BLD: 2.8 K/UL (ref 1.7–8.2)
NEUTS SEG NFR BLD: 48 % (ref 43–78)
NRBC # BLD: 0 K/UL (ref 0–0.2)
PLATELET # BLD AUTO: 259 K/UL (ref 150–450)
PMV BLD AUTO: 10.5 FL (ref 9.4–12.3)
POTASSIUM SERPL-SCNC: 4.4 MMOL/L (ref 3.5–5.1)
PROT SERPL-MCNC: 7.1 G/DL (ref 6.3–8.2)
RBC # BLD AUTO: 5.08 M/UL (ref 4.05–5.2)
SODIUM SERPL-SCNC: 136 MMOL/L (ref 136–146)
TSH W FREE THYROID IF ABNORMAL: 3.26 UIU/ML (ref 0.36–3.74)
WBC # BLD AUTO: 5.8 K/UL (ref 4.3–11.1)

## 2024-01-03 PROCEDURE — 70491 CT SOFT TISSUE NECK W/DYE: CPT

## 2024-01-03 PROCEDURE — 80053 COMPREHEN METABOLIC PANEL: CPT

## 2024-01-03 PROCEDURE — 84443 ASSAY THYROID STIM HORMONE: CPT

## 2024-01-03 PROCEDURE — 99285 EMERGENCY DEPT VISIT HI MDM: CPT

## 2024-01-03 PROCEDURE — 6360000004 HC RX CONTRAST MEDICATION: Performed by: EMERGENCY MEDICINE

## 2024-01-03 PROCEDURE — 85025 COMPLETE CBC W/AUTO DIFF WBC: CPT

## 2024-01-03 RX ADMIN — IOPAMIDOL 100 ML: 755 INJECTION, SOLUTION INTRAVENOUS at 04:52

## 2024-01-03 ASSESSMENT — ENCOUNTER SYMPTOMS
TROUBLE SWALLOWING: 1
DIARRHEA: 0
FACIAL SWELLING: 0
SHORTNESS OF BREATH: 1
ABDOMINAL PAIN: 0
VOMITING: 0

## 2024-01-03 ASSESSMENT — PAIN - FUNCTIONAL ASSESSMENT: PAIN_FUNCTIONAL_ASSESSMENT: NONE - DENIES PAIN

## 2024-01-03 NOTE — ED NOTES
I have reviewed discharge instructions with the patient.  The patient verbalized understanding.    Patient left ED via Discharge Method: ? to Home with transport.   Opportunity for questions and clarification provided.       Patient given 0 scripts.         To continue your aftercare when you leave the hospital, you may receive an automated call from our care team to check in on how you are doing.  This is a free service and part of our promise to provide the best care and service to meet your aftercare needs.” If you have questions, or wish to unsubscribe from this service please call [phone].  Thank you for Choosing our [facility] Emergency Department.

## 2024-01-03 NOTE — TELEPHONE ENCOUNTER
Pt son Mario notified and given the phone number per Dr Perera's message. He states he has called them and left a message requesting a return call, but will try contacting them again.

## 2024-01-03 NOTE — ED PROVIDER NOTES
Emergency Department Provider Note       PCP: Love Perera MD   Age: 89 y.o.   Sex: female     DISPOSITION Decision To Discharge 01/03/2024 05:45:44 AM       ICD-10-CM    1. Dyspnea and respiratory abnormalities  R06.00 Leonidas Tavares MD, Otolaryngology, [place]    R06.89           Medical Decision Making     Complexity of Problems Addressed:  1 or more acute illnesses that pose a threat to life or bodily function.     Data Reviewed and Analyzed:  I independently ordered and reviewed each unique test.  I reviewed external records: ED visit note from an outside group.  I reviewed external records: provider visit note from PCP.  I reviewed external records: provider visit note from outside specialist.  I reviewed external records: previous EKG including cardiologist interpretation.    I reviewed external records: previous lab results from outside ED.  I reviewed external records: previous imaging study including radiologist interpretation.   The patients assessment required an independent historian: EMS.  The reason they were needed is important historical information not provided by the patient.    I interpreted the CT Scan mild oropharyngeal asymmetry, no other mass or abnormality appreciated.    Discussion of management or test interpretation.  No dyspnea while in the emergency department.  Sats 99%.  Given referral to ENT.      Risk of Complications and/or Morbidity of Patient Management:  Patient was discharged risks and benefits of hospitalization were considered.         History       89-year-old female presents with sudden onset difficulty breathing when she got up to use restroom this morning.  She states she felt like she could not get air past her throat.  She states this symptom has now improved.  She has felt some throat swelling.  Denies any new exposures.  She had a stroke in October and has had difficulty swallowing since that time.  She is seeing speech therapy and has changed her

## 2024-01-03 NOTE — TELEPHONE ENCOUNTER
----- Message from Medhat Demond sent at 1/3/2024  9:44 AM EST -----  Subject: Message to Provider    QUESTIONS  Information for Provider? Patient was in ER last night and needs referral   for ENT asap. Son is trying to speed up the process please call son back.  ---------------------------------------------------------------------------  --------------  CALL BACK INFO  [phone]; OK to leave message on voicemail  ---------------------------------------------------------------------------  --------------  SCRIPT ANSWERS  Relationship to Patient? Other/Third Party  Representative Name? Mario (son)  Is the representative on the Communication Release of Information (MARYLOU)   form in Epic? Yes

## 2024-01-03 NOTE — ED TRIAGE NOTES
Per Ems \"Picked up from home.  Called complaining of feeling like something was in her throat and she couldn't clear it out.  Prior stroke on Halloween.  L sided weakness and deficits.  Her vitals were stable thru out transport.  50-60s hr bgl 155 temp 98. B/p 154/54. Allergic to sulfa drugs. \"  Feeling in her throat woke her up tonight.

## 2024-01-03 NOTE — DISCHARGE INSTRUCTIONS
Follow-up with ear nose and throat specialist for possible direct inspection of your throat as there is mild oral pharyngeal asymmetry noted on CT.  Return for worsening or concerning symptoms.    CT SOFT TISSUE NECK W CONTRAST    Result Date: 1/3/2024  CT OF THE NECK INDICATION: Trouble swallowing Multiple axial images were obtained through the neck.  80mL of Isovue 370 intravenous contrast was used for better evaluation of solid organs and vascular structures.  Radiation dose reduction techniques were used for this study.  All CT scans performed at this facility use one or all of the following: Automated exposure control, adjustment of the mA and/or kVp according to patient's size, iterative reconstruction. COMPARISON: None FINDINGS: Limited evaluation due to streak artifact from dental amalgam. Mild oropharyngeal asymmetry. - CERVICAL NODES: No significant adenopathy. - SALIVARY GLANDS: No masses. - TONSILS/PHARYNX: Normal. - THYROID: No significant mass. - SINUSES: Right maxillary sinus opacification. - BONES: No bone lesions. - LUNG APICES: Biapical scarring. - OTHER: No additional findings.     Mild oral pharyngeal asymmetry, nonspecific if further clinical concern, consider direct inspection/ENT evaluation. No evidence of abscess.     Results for orders placed or performed during the hospital encounter of 01/03/24   CMP   Result Value Ref Range    Sodium 136 136 - 146 mmol/L    Potassium 4.4 3.5 - 5.1 mmol/L    Chloride 103 103 - 113 mmol/L    CO2 31 21 - 32 mmol/L    Anion Gap 2 2 - 11 mmol/L    Glucose 154 (H) 65 - 100 mg/dL    BUN 19 8 - 23 MG/DL    Creatinine 0.79 0.6 - 1.0 MG/DL    Est, Glom Filt Rate >60 >60 ml/min/1.73m2    Calcium 9.9 8.3 - 10.4 MG/DL    Total Bilirubin 0.5 0.2 - 1.1 MG/DL    ALT 23 12 - 65 U/L    AST 15 15 - 37 U/L    Alk Phosphatase 83 50 - 136 U/L    Total Protein 7.1 6.3 - 8.2 g/dL    Albumin 4.1 3.2 - 4.6 g/dL    Globulin 3.0 2.8 - 4.5 g/dL    Albumin/Globulin Ratio 1.4 0.4 - 1.6

## 2024-01-03 NOTE — TELEPHONE ENCOUNTER
----- Message from Medhat Demond sent at 1/3/2024  9:44 AM EST -----  Subject: Message to Provider    QUESTIONS  Information for Provider? Patient was in ER last night and needs referral   for ENT asap. Son is trying to speed up the process please call son back.  ---------------------------------------------------------------------------  --------------  CALL BACK INFO  [phone]; OK to leave message on voicemail  ---------------------------------------------------------------------------  --------------  SCRIPT ANSWERS  Relationship to Patient? Other/Third Party  Representative Name? Mario (son)  Is the representative on the Communication Release of Information (MARYLOU)   form in Epic? Yes

## 2024-01-10 ENCOUNTER — OFFICE VISIT (OUTPATIENT)
Dept: ENT CLINIC | Age: 89
End: 2024-01-10
Payer: MEDICARE

## 2024-01-10 VITALS — BODY MASS INDEX: 22.47 KG/M2 | WEIGHT: 119 LBS | RESPIRATION RATE: 16 BRPM | HEIGHT: 61 IN

## 2024-01-10 DIAGNOSIS — R06.89 DYSPNEA AND RESPIRATORY ABNORMALITIES: ICD-10-CM

## 2024-01-10 DIAGNOSIS — I69.391 DYSPHAGIA DUE TO RECENT CEREBROVASCULAR ACCIDENT (CVA): ICD-10-CM

## 2024-01-10 DIAGNOSIS — K21.9 GASTROESOPHAGEAL REFLUX DISEASE WITHOUT ESOPHAGITIS: ICD-10-CM

## 2024-01-10 DIAGNOSIS — J32.0 CHRONIC MAXILLARY SINUSITIS: Primary | ICD-10-CM

## 2024-01-10 DIAGNOSIS — R06.00 DYSPNEA AND RESPIRATORY ABNORMALITIES: ICD-10-CM

## 2024-01-10 PROCEDURE — 99204 OFFICE O/P NEW MOD 45 MIN: CPT | Performed by: STUDENT IN AN ORGANIZED HEALTH CARE EDUCATION/TRAINING PROGRAM

## 2024-01-10 PROCEDURE — 1123F ACP DISCUSS/DSCN MKR DOCD: CPT | Performed by: STUDENT IN AN ORGANIZED HEALTH CARE EDUCATION/TRAINING PROGRAM

## 2024-01-10 PROCEDURE — G8427 DOCREV CUR MEDS BY ELIG CLIN: HCPCS | Performed by: STUDENT IN AN ORGANIZED HEALTH CARE EDUCATION/TRAINING PROGRAM

## 2024-01-10 PROCEDURE — 31231 NASAL ENDOSCOPY DX: CPT | Performed by: STUDENT IN AN ORGANIZED HEALTH CARE EDUCATION/TRAINING PROGRAM

## 2024-01-10 PROCEDURE — G8484 FLU IMMUNIZE NO ADMIN: HCPCS | Performed by: STUDENT IN AN ORGANIZED HEALTH CARE EDUCATION/TRAINING PROGRAM

## 2024-01-10 PROCEDURE — G8420 CALC BMI NORM PARAMETERS: HCPCS | Performed by: STUDENT IN AN ORGANIZED HEALTH CARE EDUCATION/TRAINING PROGRAM

## 2024-01-10 PROCEDURE — 1036F TOBACCO NON-USER: CPT | Performed by: STUDENT IN AN ORGANIZED HEALTH CARE EDUCATION/TRAINING PROGRAM

## 2024-01-10 PROCEDURE — 1090F PRES/ABSN URINE INCON ASSESS: CPT | Performed by: STUDENT IN AN ORGANIZED HEALTH CARE EDUCATION/TRAINING PROGRAM

## 2024-01-10 ASSESSMENT — ENCOUNTER SYMPTOMS
SHORTNESS OF BREATH: 0
SORE THROAT: 0
ABDOMINAL PAIN: 0

## 2024-01-10 NOTE — PROGRESS NOTES
artifact from dental amalgam.  Mild oropharyngeal asymmetry.  - CERVICAL NODES: No significant adenopathy.  - SALIVARY GLANDS: No masses.  - TONSILS/PHARYNX: Normal.  - THYROID: No significant mass.    - SINUSES: Right maxillary sinus opacification.  - BONES: No bone lesions.  - LUNG APICES: Biapical scarring.  - OTHER: No additional findings.    Impression  Mild oral pharyngeal asymmetry, nonspecific if further clinical  concern, consider direct inspection/ENT evaluation.    No evidence of abscess.    MRI Result (most recent):  MRI BRAIN WO CONTRAST 10/28/2023    Narrative  Exam: MRI BRAIN WO CONTRAST on 10/28/2023 4:52 PM    Clinical History: The Female patient is 89 years old presenting for slurred  speech and left lower extremity weakness.    Comparison:  Head CT 10/27/2023    Technique:  Axial T2, axial FLAIR, axial diffusion-weighted,  sagittal T1 and  coronal gradient-echo scans were performed.    Findings:    Focal subacute ischemic changes are seen in the right corona radiata extending  inferiorly into the posterior limb of the right internal capsule. There is  otherwise chronic periventricular white matter disease with scattered lacunae.  There are remote ischemic changes involving the left occipital lobe with focal  encephalomalacia and gliosis. Age-related cortical involutional changes are  seen.    There are no abnormal extra-axial fluid collections. No evidence of mass or mass  effect is seen.  Expected flow voids are maintained in the major intracranial  vessels.    Cerebellar involutional changes are demonstrated. Visualized brainstem  structures are unremarkable. There is no evidence of Chiari malformation.    The ventricular system and CSF containing spaces are unremarkable in appearance.    Visualized extracranial soft tissues are unremarkable.    There is moderate right maxillary sinus mucosal inflammatory disease.    Impression  1.  Focal subacute ischemic changes involving the right corona

## 2024-01-11 ENCOUNTER — OFFICE VISIT (OUTPATIENT)
Dept: NEUROLOGY | Age: 89
End: 2024-01-11
Payer: MEDICARE

## 2024-01-11 VITALS
OXYGEN SATURATION: 96 % | BODY MASS INDEX: 21.75 KG/M2 | DIASTOLIC BLOOD PRESSURE: 72 MMHG | WEIGHT: 115.2 LBS | HEIGHT: 61 IN | HEART RATE: 65 BPM | SYSTOLIC BLOOD PRESSURE: 126 MMHG

## 2024-01-11 DIAGNOSIS — E78.5 HYPERLIPIDEMIA, UNSPECIFIED HYPERLIPIDEMIA TYPE: ICD-10-CM

## 2024-01-11 DIAGNOSIS — I10 PRIMARY HYPERTENSION: ICD-10-CM

## 2024-01-11 DIAGNOSIS — E11.9 TYPE 2 DIABETES MELLITUS WITHOUT COMPLICATION, WITHOUT LONG-TERM CURRENT USE OF INSULIN (HCC): ICD-10-CM

## 2024-01-11 DIAGNOSIS — I63.81 CEREBROVASCULAR ACCIDENT (CVA) DUE TO OCCLUSION OF SMALL ARTERY (HCC): Primary | ICD-10-CM

## 2024-01-11 PROCEDURE — G8427 DOCREV CUR MEDS BY ELIG CLIN: HCPCS | Performed by: PHYSICAL THERAPIST

## 2024-01-11 PROCEDURE — 1036F TOBACCO NON-USER: CPT | Performed by: PHYSICAL THERAPIST

## 2024-01-11 PROCEDURE — G8420 CALC BMI NORM PARAMETERS: HCPCS | Performed by: PHYSICAL THERAPIST

## 2024-01-11 PROCEDURE — 1123F ACP DISCUSS/DSCN MKR DOCD: CPT | Performed by: PHYSICAL THERAPIST

## 2024-01-11 PROCEDURE — G8484 FLU IMMUNIZE NO ADMIN: HCPCS | Performed by: PHYSICAL THERAPIST

## 2024-01-11 PROCEDURE — 1090F PRES/ABSN URINE INCON ASSESS: CPT | Performed by: PHYSICAL THERAPIST

## 2024-01-11 PROCEDURE — 99204 OFFICE O/P NEW MOD 45 MIN: CPT | Performed by: PHYSICAL THERAPIST

## 2024-01-11 ASSESSMENT — PATIENT HEALTH QUESTIONNAIRE - PHQ9
SUM OF ALL RESPONSES TO PHQ QUESTIONS 1-9: 0
2. FEELING DOWN, DEPRESSED OR HOPELESS: 0
1. LITTLE INTEREST OR PLEASURE IN DOING THINGS: 0
SUM OF ALL RESPONSES TO PHQ QUESTIONS 1-9: 0
SUM OF ALL RESPONSES TO PHQ QUESTIONS 1-9: 0
SUM OF ALL RESPONSES TO PHQ9 QUESTIONS 1 & 2: 0
SUM OF ALL RESPONSES TO PHQ QUESTIONS 1-9: 0

## 2024-01-11 NOTE — PATIENT INSTRUCTIONS
Patient Education        Learning About How to Prevent Another Stroke  How can you help prevent another stroke?  After a stroke, people feel lots of different emotions. Some people are worried that they could have another stroke. Or they may feel overwhelmed by how much there is to learn and do. Some people feel sad or depressed.  No matter what emotions you are feeling, you can give yourself some control and peace of mind by following your plan to lower your risk of having another stroke.  Take your medicines  You'll need to take medicines to help prevent another stroke. Be sure to take your medicines exactly as prescribed. And don't stop taking them unless your doctor tells you to. If you stop taking your medicines, you can increase your risk of having another stroke.  Some of the medicines your doctor may prescribe include:  Aspirin or some other blood thinner to prevent blood clots.  Statins and other medicines to lower cholesterol.  Blood pressure medicines to lower blood pressure.  Manage other health problems  You can help lower your chance of having another stroke by managing certain other health problems. Problems that increase your risk of having another stroke include:  Atrial fibrillation.  Carotid artery disease.  Diabetes.  High blood pressure.  High cholesterol.  If you have any of these health problems, you can manage them with a healthy lifestyle along with medicine.  If you think you may have a problem with alcohol or drug use, talk to your doctor. This includes prescription medicines (such as amphetamines and opioids) and illegal drugs (such as cocaine and methamphetamine). Your doctor can help you figure out what type of treatment is best for you.  Have a heart-healthy lifestyle  Do not smoke or allow others to smoke around you. If you need help quitting, talk to your doctor about stop-smoking programs and medicines. These can increase your chances of quitting for good. Smoking makes a stroke

## 2024-01-11 NOTE — PROGRESS NOTES
Patent.    Cervical Right internal carotid artery:  Diffuse mural irregularity, calcified  plaque in the carotid bulb, probable ulcerated plaque at the level of C2,  possible fibromuscular dysplasia, tortuous, patent.      Left common carotid artery: Mild atherosclerotic disease, patent.    Left external carotid artery:  Patent.    Cervical Left internal carotid artery:  Calcified plaque in the carotid bulb,  diffuse mild irregularity, tortuous, patent.       Right vertebral artery:  Tortuous, patent.    Left vertebral artery:  Narrowed origin, tortuous, terminates in.    Basilar artery:  Relatively small diameter, patent.       Intracranial right internal carotid artery:  Fairly extensive calcified  atherosclerotic disease results in moderate luminal narrowing, patent.  Right middle cerebral artery:  Mural irregularity, patent.    Right anterior cerebral artery:  Relatively small diameter A1 segment, mural  irregularity, patent.    Anterior communicating artery: Small diameter, patent.    Left anterior cerebral artery:  Mural irregularity, punctate calcification in  the A2 segment, patent.  Left middle cerebral artery:  Mural irregularity, patent.    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     Right posterior communicating artery: Not visualized.    Left posterior communicating artery:  Although incompletely visualized, I  believe there is an extremely small diameter, faintly enhanced, left P-comm  arising from an infundibular origin.  This would explain the \"aneurysm\"  described above.    Right posterior cerebral artery:  Comparatively small diameter, patent.    Left posterior cerebral artery:  Extremely tortuous at its origin, mural  irregularity, much better visualized than the right, patent.      Most recent MRI - I personally reviewed

## 2024-01-16 ENCOUNTER — TELEPHONE (OUTPATIENT)
Dept: INTERNAL MEDICINE CLINIC | Facility: CLINIC | Age: 89
End: 2024-01-16

## 2024-01-16 NOTE — TELEPHONE ENCOUNTER
----- Message from Natali Dan sent at 1/16/2024  3:20 PM EST -----  Subject: Message to Provider    QUESTIONS  Information for Provider? Pt's physical therapist is requesting an order   to increase pt's therapy sessions to one time a week for five weeks.   Please return call to Nyla for update or questions.   ---------------------------------------------------------------------------  --------------  CALL BACK INFO  [phone]; OK to leave message on voicemail  ---------------------------------------------------------------------------  --------------  SCRIPT ANSWERS  Relationship to Patient? Covered Entity  Covered Entity Type? Physical Therapy Office?  Representative Name? Nyla

## 2024-02-01 ENCOUNTER — TELEPHONE (OUTPATIENT)
Dept: INTERNAL MEDICINE CLINIC | Facility: CLINIC | Age: 89
End: 2024-02-01

## 2024-02-01 NOTE — TELEPHONE ENCOUNTER
Left message for pt son Mario that he can contact the [facility] infusion at [phone] to discuss his questions about her scheduled infusion appointment or he can call the office back if he needs anything from us.

## 2024-02-01 NOTE — TELEPHONE ENCOUNTER
Her son Mr. Mario Nobles would like a call back to get information about her infusion appointment.    Please call him back at [phone].

## 2024-02-05 ENCOUNTER — NURSE ONLY (OUTPATIENT)
Age: 89
End: 2024-02-05
Payer: MEDICARE

## 2024-02-05 VITALS
TEMPERATURE: 97.2 F | DIASTOLIC BLOOD PRESSURE: 65 MMHG | RESPIRATION RATE: 16 BRPM | HEART RATE: 74 BPM | SYSTOLIC BLOOD PRESSURE: 95 MMHG

## 2024-02-05 DIAGNOSIS — M81.0 AGE-RELATED OSTEOPOROSIS WITHOUT CURRENT PATHOLOGICAL FRACTURE: Primary | ICD-10-CM

## 2024-02-05 DIAGNOSIS — D75.1 ERYTHROCYTOSIS: ICD-10-CM

## 2024-02-05 DIAGNOSIS — E83.52 HYPERCALCEMIA: ICD-10-CM

## 2024-02-05 LAB
ALBUMIN SERPL-MCNC: 3.8 G/DL (ref 3.2–4.6)
ANION GAP SERPL CALC-SCNC: 5 MMOL/L (ref 2–11)
BUN SERPL-MCNC: 24 MG/DL (ref 8–23)
CALCIUM SERPL-MCNC: 9.4 MG/DL (ref 8.3–10.4)
CALCIUM SERPL-MCNC: 9.6 MG/DL (ref 8.3–10.4)
CHLORIDE SERPL-SCNC: 107 MMOL/L (ref 103–113)
CO2 SERPL-SCNC: 28 MMOL/L (ref 21–32)
CREAT SERPL-MCNC: 0.8 MG/DL (ref 0.6–1)
GLUCOSE SERPL-MCNC: 130 MG/DL (ref 65–100)
HGB BLD-MCNC: 14.6 G/DL (ref 11.7–15.4)
POTASSIUM SERPL-SCNC: 4.1 MMOL/L (ref 3.5–5.1)
PTH-INTACT SERPL-MCNC: 37.8 PG/ML (ref 18.5–88)
SODIUM SERPL-SCNC: 140 MMOL/L (ref 136–146)

## 2024-02-05 PROCEDURE — 96365 THER/PROPH/DIAG IV INF INIT: CPT | Performed by: PSYCHIATRY & NEUROLOGY

## 2024-02-05 RX ORDER — ALBUTEROL SULFATE 90 UG/1
4 AEROSOL, METERED RESPIRATORY (INHALATION) PRN
OUTPATIENT
Start: 2025-02-02

## 2024-02-05 RX ORDER — ZOLEDRONIC ACID 5 MG/100ML
5 INJECTION, SOLUTION INTRAVENOUS ONCE
Status: COMPLETED | OUTPATIENT
Start: 2024-02-05 | End: 2024-02-05

## 2024-02-05 RX ORDER — EPINEPHRINE 1 MG/ML
0.3 INJECTION, SOLUTION, CONCENTRATE INTRAVENOUS PRN
Status: DISCONTINUED | OUTPATIENT
Start: 2024-02-05 | End: 2024-02-05 | Stop reason: HOSPADM

## 2024-02-05 RX ORDER — SODIUM CHLORIDE 9 MG/ML
5-250 INJECTION, SOLUTION INTRAVENOUS PRN
OUTPATIENT
Start: 2025-02-02

## 2024-02-05 RX ORDER — ACETAMINOPHEN 325 MG/1
650 TABLET ORAL
OUTPATIENT
Start: 2025-02-02

## 2024-02-05 RX ORDER — ZOLEDRONIC ACID 5 MG/100ML
5 INJECTION, SOLUTION INTRAVENOUS ONCE
OUTPATIENT
Start: 2025-02-02 | End: 2025-02-02

## 2024-02-05 RX ORDER — ACETAMINOPHEN 325 MG/1
650 TABLET ORAL ONCE
Status: CANCELLED
Start: 2025-02-02 | End: 2025-02-02

## 2024-02-05 RX ORDER — DIPHENHYDRAMINE HYDROCHLORIDE 50 MG/ML
50 INJECTION INTRAMUSCULAR; INTRAVENOUS
OUTPATIENT
Start: 2025-02-02

## 2024-02-05 RX ORDER — HEPARIN 100 UNIT/ML
500 SYRINGE INTRAVENOUS PRN
OUTPATIENT
Start: 2025-02-02

## 2024-02-05 RX ORDER — EPINEPHRINE 1 MG/ML
0.3 INJECTION, SOLUTION, CONCENTRATE INTRAVENOUS PRN
OUTPATIENT
Start: 2025-02-02

## 2024-02-05 RX ORDER — SODIUM CHLORIDE 9 MG/ML
INJECTION, SOLUTION INTRAVENOUS CONTINUOUS
OUTPATIENT
Start: 2025-02-02

## 2024-02-05 RX ORDER — ONDANSETRON 2 MG/ML
8 INJECTION INTRAMUSCULAR; INTRAVENOUS
OUTPATIENT
Start: 2025-02-02

## 2024-02-05 RX ORDER — SODIUM CHLORIDE 9 MG/ML
INJECTION, SOLUTION INTRAVENOUS CONTINUOUS
Status: DISCONTINUED | OUTPATIENT
Start: 2024-02-05 | End: 2024-02-05 | Stop reason: HOSPADM

## 2024-02-05 RX ORDER — DIPHENHYDRAMINE HYDROCHLORIDE 50 MG/ML
50 INJECTION INTRAMUSCULAR; INTRAVENOUS
Status: DISCONTINUED | OUTPATIENT
Start: 2024-02-05 | End: 2024-02-05 | Stop reason: HOSPADM

## 2024-02-05 RX ORDER — ALBUTEROL SULFATE 90 UG/1
4 AEROSOL, METERED RESPIRATORY (INHALATION) PRN
Status: DISCONTINUED | OUTPATIENT
Start: 2024-02-05 | End: 2024-02-05 | Stop reason: HOSPADM

## 2024-02-05 RX ORDER — ONDANSETRON 2 MG/ML
8 INJECTION INTRAMUSCULAR; INTRAVENOUS
Status: DISCONTINUED | OUTPATIENT
Start: 2024-02-05 | End: 2024-02-05 | Stop reason: HOSPADM

## 2024-02-05 RX ORDER — ACETAMINOPHEN 325 MG/1
650 TABLET ORAL
Status: DISCONTINUED | OUTPATIENT
Start: 2024-02-05 | End: 2024-02-05 | Stop reason: HOSPADM

## 2024-02-05 RX ORDER — SODIUM CHLORIDE 0.9 % (FLUSH) 0.9 %
5-40 SYRINGE (ML) INJECTION PRN
OUTPATIENT
Start: 2025-02-02

## 2024-02-05 RX ORDER — ACETAMINOPHEN 325 MG/1
650 TABLET ORAL ONCE
Status: DISCONTINUED | OUTPATIENT
Start: 2024-02-05 | End: 2024-02-05 | Stop reason: HOSPADM

## 2024-02-05 RX ADMIN — ZOLEDRONIC ACID 5 MG: 5 INJECTION, SOLUTION INTRAVENOUS at 09:37

## 2024-02-05 NOTE — PROGRESS NOTES
[facility]  [address], [address] [place] [address]  Office : [phone], Fax: [phone]       Osteoporosis pre-infusion/injection questionnaire:    1. In the past few months or in the few months, have you had or are you planning to have any dental surgery or major dental procedures?  no    2. Are you taking a calcium and vitamin D supplement?yes    3. When was your last osteoporosis treatment? 1 year    4. In the last year, have you had any fractures? no        Pt ambulatory to infusion suite today for repeat Reclast infusion.  Pertinent labs drawn, labs reviewed and are within medication administration parameters.    Pt observed for 30 minutes post-infusion without complications.  Advised patient to continue with calcium and vitamin D supplements post injection. Advised patient to call the ordering provider with any problems post injection.      Next Reclast infusion appt scheduled with pt for 1 year from now.    Pt ambulatory with steady gait out of infusion suite.

## 2024-02-12 ENCOUNTER — OFFICE VISIT (OUTPATIENT)
Dept: INTERNAL MEDICINE CLINIC | Facility: CLINIC | Age: 89
End: 2024-02-12
Payer: MEDICARE

## 2024-02-12 VITALS
DIASTOLIC BLOOD PRESSURE: 78 MMHG | BODY MASS INDEX: 22.09 KG/M2 | SYSTOLIC BLOOD PRESSURE: 158 MMHG | WEIGHT: 117 LBS | HEIGHT: 61 IN | HEART RATE: 68 BPM | OXYGEN SATURATION: 98 %

## 2024-02-12 DIAGNOSIS — E11.9 TYPE 2 DIABETES MELLITUS WITHOUT COMPLICATION, WITHOUT LONG-TERM CURRENT USE OF INSULIN (HCC): Primary | ICD-10-CM

## 2024-02-12 DIAGNOSIS — I10 HYPERTENSION, UNSPECIFIED TYPE: ICD-10-CM

## 2024-02-12 DIAGNOSIS — K21.9 GASTROESOPHAGEAL REFLUX DISEASE WITHOUT ESOPHAGITIS: ICD-10-CM

## 2024-02-12 DIAGNOSIS — M79.662 PAIN IN LEFT SHIN: ICD-10-CM

## 2024-02-12 DIAGNOSIS — D03.70 MELANOMA IN SITU OF LOWER EXTREMITY, UNSPECIFIED LATERALITY (HCC): ICD-10-CM

## 2024-02-12 DIAGNOSIS — E78.5 DYSLIPIDEMIA: ICD-10-CM

## 2024-02-12 DIAGNOSIS — M81.0 AGE-RELATED OSTEOPOROSIS WITHOUT CURRENT PATHOLOGICAL FRACTURE: ICD-10-CM

## 2024-02-12 DIAGNOSIS — E11.65 TYPE 2 DIABETES MELLITUS WITH HYPERGLYCEMIA, WITHOUT LONG-TERM CURRENT USE OF INSULIN (HCC): ICD-10-CM

## 2024-02-12 PROBLEM — H54.7 VISUAL IMPAIRMENT: Status: RESOLVED | Noted: 2019-10-31 | Resolved: 2024-02-12

## 2024-02-12 PROCEDURE — 99214 OFFICE O/P EST MOD 30 MIN: CPT | Performed by: STUDENT IN AN ORGANIZED HEALTH CARE EDUCATION/TRAINING PROGRAM

## 2024-02-12 PROCEDURE — G8420 CALC BMI NORM PARAMETERS: HCPCS | Performed by: STUDENT IN AN ORGANIZED HEALTH CARE EDUCATION/TRAINING PROGRAM

## 2024-02-12 PROCEDURE — G8427 DOCREV CUR MEDS BY ELIG CLIN: HCPCS | Performed by: STUDENT IN AN ORGANIZED HEALTH CARE EDUCATION/TRAINING PROGRAM

## 2024-02-12 PROCEDURE — 1090F PRES/ABSN URINE INCON ASSESS: CPT | Performed by: STUDENT IN AN ORGANIZED HEALTH CARE EDUCATION/TRAINING PROGRAM

## 2024-02-12 PROCEDURE — 1123F ACP DISCUSS/DSCN MKR DOCD: CPT | Performed by: STUDENT IN AN ORGANIZED HEALTH CARE EDUCATION/TRAINING PROGRAM

## 2024-02-12 PROCEDURE — G8484 FLU IMMUNIZE NO ADMIN: HCPCS | Performed by: STUDENT IN AN ORGANIZED HEALTH CARE EDUCATION/TRAINING PROGRAM

## 2024-02-12 PROCEDURE — 1036F TOBACCO NON-USER: CPT | Performed by: STUDENT IN AN ORGANIZED HEALTH CARE EDUCATION/TRAINING PROGRAM

## 2024-02-12 RX ORDER — OMEPRAZOLE 20 MG/1
20 CAPSULE, DELAYED RELEASE ORAL DAILY
Qty: 90 CAPSULE | Refills: 3 | Status: SHIPPED | OUTPATIENT
Start: 2024-02-12

## 2024-02-12 RX ORDER — ACYCLOVIR 400 MG/1
1 TABLET ORAL CONTINUOUS
Qty: 1 EACH | Refills: 0 | Status: SHIPPED | OUTPATIENT
Start: 2024-02-12

## 2024-02-12 RX ORDER — PRAVASTATIN SODIUM 20 MG
20 TABLET ORAL DAILY
Qty: 60 TABLET | Refills: 1 | Status: SHIPPED | OUTPATIENT
Start: 2024-02-12

## 2024-02-13 NOTE — ASSESSMENT & PLAN NOTE
Seems to be having some labile blood pressures, she will have home health check blood pressure at home, she is currently on lisinopril

## 2024-02-20 ENCOUNTER — TELEPHONE (OUTPATIENT)
Dept: INTERNAL MEDICINE CLINIC | Facility: CLINIC | Age: 89
End: 2024-02-20

## 2024-02-20 NOTE — TELEPHONE ENCOUNTER
Zurdo Nyla from [facility] requested verbal orders for once a week for six weeks and one time every other week for two weeks.    If you have any questions, please call her back at [phone].

## 2024-02-28 DIAGNOSIS — E11.9 TYPE 2 DIABETES MELLITUS WITHOUT COMPLICATION, WITHOUT LONG-TERM CURRENT USE OF INSULIN (HCC): ICD-10-CM

## 2024-02-28 LAB
CHOLEST SERPL-MCNC: 208 MG/DL
HDLC SERPL-MCNC: 50 MG/DL (ref 40–60)
HDLC SERPL: 4.2
LDLC SERPL CALC-MCNC: 132 MG/DL
TRIGL SERPL-MCNC: 130 MG/DL (ref 35–150)
VLDLC SERPL CALC-MCNC: 26 MG/DL (ref 6–23)

## 2024-02-28 RX ORDER — ACYCLOVIR 400 MG/1
1 TABLET ORAL CONTINUOUS
Qty: 1 EACH | Refills: 0 | Status: SHIPPED | OUTPATIENT
Start: 2024-02-28

## 2024-02-28 NOTE — TELEPHONE ENCOUNTER
Pt says that [facility] doesn't have     Continuous Blood Gluc Sensor (DEXCOM G7 SENSOR) MISC   Or  Continuous Blood Gluc  (DEXCOM G7 ) MANUEL     And is asking if it can be sent to [facility] on [address], [place]

## 2024-02-29 LAB
EST. AVERAGE GLUCOSE BLD GHB EST-MCNC: 134 MG/DL
HBA1C MFR BLD: 6.3 % (ref 4.8–5.6)

## 2024-03-22 ENCOUNTER — TELEPHONE (OUTPATIENT)
Dept: INTERNAL MEDICINE CLINIC | Facility: CLINIC | Age: 89
End: 2024-03-22

## 2024-03-22 NOTE — TELEPHONE ENCOUNTER
----- Message from Hermelinda Bryant sent at 3/20/2024  3:30 PM EDT -----  Subject: Message to Provider    QUESTIONS  Information for Provider? stella newton from [facility] request to   send an message to dr. bundy in regards report patient status that the pt.   is doing great and would be discharge on 3/20/2024 and if there are any   question stella can be contacted at [phone]  ---------------------------------------------------------------------------  --------------  CALL BACK INFO  [phone]; OK to leave message on voicemail  ---------------------------------------------------------------------------  --------------  SCRIPT ANSWERS  Relationship to Patient? Covered Entity  Covered Entity Type? Home Health Care?  Representative Name? stella newton [facility]

## 2024-04-11 ENCOUNTER — HOSPITAL ENCOUNTER (EMERGENCY)
Age: 89
Discharge: HOME | End: 2024-04-11
Payer: MEDICARE

## 2024-04-11 ENCOUNTER — HOSPITAL ENCOUNTER (OUTPATIENT)
Dept: GENERAL RADIOLOGY | Age: 89
End: 2024-04-11
Attending: STUDENT IN AN ORGANIZED HEALTH CARE EDUCATION/TRAINING PROGRAM
Payer: MEDICARE

## 2024-04-11 VITALS
HEART RATE: 65 BPM | HEIGHT: 61 IN | DIASTOLIC BLOOD PRESSURE: 80 MMHG | WEIGHT: 109 LBS | SYSTOLIC BLOOD PRESSURE: 152 MMHG | TEMPERATURE: 97.3 F | BODY MASS INDEX: 20.58 KG/M2 | RESPIRATION RATE: 14 BRPM | OXYGEN SATURATION: 96 %

## 2024-04-11 DIAGNOSIS — N39.0 URINARY TRACT INFECTION WITHOUT HEMATURIA, SITE UNSPECIFIED: Primary | ICD-10-CM

## 2024-04-11 DIAGNOSIS — I69.391 DYSPHAGIA DUE TO RECENT CEREBROVASCULAR ACCIDENT (CVA): ICD-10-CM

## 2024-04-11 LAB
ALBUMIN SERPL-MCNC: 4.2 G/DL (ref 3.2–4.6)
ALBUMIN/GLOB SERPL: 1.2 (ref 0.4–1.6)
ALP SERPL-CCNC: 68 U/L (ref 50–136)
ALT SERPL-CCNC: 22 U/L (ref 12–65)
ANION GAP SERPL CALC-SCNC: 6 MMOL/L (ref 2–11)
AST SERPL-CCNC: 19 U/L (ref 15–37)
BACTERIA URNS QL MICRO: ABNORMAL /HPF
BASOPHILS # BLD: 0 K/UL (ref 0–0.2)
BASOPHILS NFR BLD: 1 % (ref 0–2)
BILIRUB SERPL-MCNC: 0.4 MG/DL (ref 0.2–1.1)
BILIRUB UR QL: NEGATIVE
BUN SERPL-MCNC: 19 MG/DL (ref 8–23)
CALCIUM SERPL-MCNC: 10.2 MG/DL (ref 8.3–10.4)
CASTS URNS QL MICRO: 0 /LPF
CHLORIDE SERPL-SCNC: 104 MMOL/L (ref 103–113)
CO2 SERPL-SCNC: 28 MMOL/L (ref 21–32)
CREAT SERPL-MCNC: 0.99 MG/DL (ref 0.6–1)
CRYSTALS URNS QL MICRO: 0 /LPF
DIFFERENTIAL METHOD BLD: NORMAL
EOSINOPHIL # BLD: 0.1 K/UL (ref 0–0.8)
EOSINOPHIL NFR BLD: 1 % (ref 0.5–7.8)
EPI CELLS #/AREA URNS HPF: ABNORMAL /HPF
ERYTHROCYTE [DISTWIDTH] IN BLOOD BY AUTOMATED COUNT: 13.1 % (ref 11.9–14.6)
GLOBULIN SER CALC-MCNC: 3.4 G/DL (ref 2.8–4.5)
GLUCOSE SERPL-MCNC: 104 MG/DL (ref 65–100)
GLUCOSE UR QL STRIP.AUTO: NEGATIVE MG/DL
HCT VFR BLD AUTO: 43 % (ref 35.8–46.3)
HGB BLD-MCNC: 14.7 G/DL (ref 11.7–15.4)
IMM GRANULOCYTES # BLD AUTO: 0 K/UL (ref 0–0.5)
IMM GRANULOCYTES NFR BLD AUTO: 0 % (ref 0–5)
KETONES UR-MCNC: NEGATIVE MG/DL
LEUKOCYTE ESTERASE UR QL STRIP: ABNORMAL
LYMPHOCYTES # BLD: 2.4 K/UL (ref 0.5–4.6)
LYMPHOCYTES NFR BLD: 35 % (ref 13–44)
MCH RBC QN AUTO: 31.5 PG (ref 26.1–32.9)
MCHC RBC AUTO-ENTMCNC: 34.2 G/DL (ref 31.4–35)
MCV RBC AUTO: 92.3 FL (ref 82–102)
MONOCYTES # BLD: 0.6 K/UL (ref 0.1–1.3)
MONOCYTES NFR BLD: 9 % (ref 4–12)
MUCOUS THREADS URNS QL MICRO: 0 /LPF
NEUTS SEG # BLD: 3.8 K/UL (ref 1.7–8.2)
NEUTS SEG NFR BLD: 54 % (ref 43–78)
NITRITE UR QL: POSITIVE
NRBC # BLD: 0 K/UL (ref 0–0.2)
OTHER OBSERVATIONS: ABNORMAL
PH UR: 5.5 (ref 5–9)
PLATELET # BLD AUTO: 292 K/UL (ref 150–450)
PMV BLD AUTO: 10.8 FL (ref 9.4–12.3)
POTASSIUM SERPL-SCNC: 4 MMOL/L (ref 3.5–5.1)
PROT SERPL-MCNC: 7.6 G/DL (ref 6.3–8.2)
PROT UR QL: 30 MG/DL
RBC # BLD AUTO: 4.66 M/UL (ref 4.05–5.2)
RBC # UR STRIP: ABNORMAL
RBC #/AREA URNS HPF: ABNORMAL /HPF
SERVICE CMNT-IMP: ABNORMAL
SODIUM SERPL-SCNC: 138 MMOL/L (ref 136–146)
SP GR UR: 1.02 (ref 1–1.02)
UROBILINOGEN UR QL: 0.2 EU/DL (ref 0.2–1)
WBC # BLD AUTO: 7 K/UL (ref 4.3–11.1)
WBC URNS QL MICRO: ABNORMAL /HPF

## 2024-04-11 PROCEDURE — 85025 COMPLETE CBC W/AUTO DIFF WBC: CPT

## 2024-04-11 PROCEDURE — 2500000003 HC RX 250 WO HCPCS: Performed by: STUDENT IN AN ORGANIZED HEALTH CARE EDUCATION/TRAINING PROGRAM

## 2024-04-11 PROCEDURE — 87086 URINE CULTURE/COLONY COUNT: CPT

## 2024-04-11 PROCEDURE — 74230 X-RAY XM SWLNG FUNCJ C+: CPT

## 2024-04-11 PROCEDURE — 92611 MOTION FLUOROSCOPY/SWALLOW: CPT

## 2024-04-11 PROCEDURE — 99283 EMERGENCY DEPT VISIT LOW MDM: CPT

## 2024-04-11 PROCEDURE — 80053 COMPREHEN METABOLIC PANEL: CPT

## 2024-04-11 PROCEDURE — 81003 URINALYSIS AUTO W/O SCOPE: CPT

## 2024-04-11 PROCEDURE — 81015 MICROSCOPIC EXAM OF URINE: CPT

## 2024-04-11 RX ORDER — CEFDINIR 300 MG/1
300 CAPSULE ORAL 2 TIMES DAILY
Qty: 14 CAPSULE | Refills: 0 | Status: SHIPPED | OUTPATIENT
Start: 2024-04-11 | End: 2024-04-18

## 2024-04-11 RX ADMIN — BARIUM SULFATE 30 ML: 0.81 POWDER, FOR SUSPENSION ORAL at 09:44

## 2024-04-11 RX ADMIN — BARIUM SULFATE 15 ML: 400 PASTE ORAL at 09:44

## 2024-04-11 ASSESSMENT — ENCOUNTER SYMPTOMS
NAUSEA: 0
SORE THROAT: 0
EYE REDNESS: 0
BACK PAIN: 0
ABDOMINAL DISTENTION: 0
SHORTNESS OF BREATH: 0
DIARRHEA: 0
VOMITING: 0
COUGH: 0
CHEST TIGHTNESS: 0

## 2024-04-11 ASSESSMENT — PAIN - FUNCTIONAL ASSESSMENT: PAIN_FUNCTIONAL_ASSESSMENT: NONE - DENIES PAIN

## 2024-04-11 NOTE — ED NOTES
Patient mobility status  with difficulty, uses a walker. Provider aware     I have reviewed discharge instructions with the patient and caregiver.  The patient and caregiver verbalized understanding.    Patient left ED via Discharge Method: ambulatory to Home with  self and caregiver .    Opportunity for questions and clarification provided.     Patient given 1 script.

## 2024-04-11 NOTE — FLOWSHEET NOTE
04/11/24 1444   Vital Signs   Orthostatic B/P and Pulse? Yes   Blood Pressure Lying 148/51   Pulse Lying 66 PER MINUTE   Blood Pressure Sitting 167/56   Pulse Sitting 69 PER MINUTE   Blood Pressure Standing 156/58   Pulse Standing 83 PER MINUTE

## 2024-04-11 NOTE — THERAPY EVALUATION
Brenda Nobles  : 1932  Primary: Medicare Part A And B  Secondary: [employer] SENIOR MEDICARE SUPP  MRN: [id number] [facility]  [address] [place] [address]  Phone: [phone]    Visit Info:    Date 2024   SPEECH LANGUAGE PATHOLOGY:   MODIFIED BARIUM SWALLOW STUDY     Appt Desk   Episode      Treatment Diagnosis:    Dysphagia due to recent cerebrovascular accident (CVA)    Medical/Referring Diagnosis:  Dysphagia due to recent cerebrovascular accident (CVA) [I69.391]  Referring Physician:  Leonidas Perez MD  Radiologist: Dr. Maya  Fluoroscopy completed by: Dr. Zulma IVY Orders: Modified Barium Swallow  Date of Onset:  No data recorded   Allergies:  Atorvastatin, Ezetimibe-simvastatin, Penicillins, and Sulfa antibiotics    PAST MEDICAL HISTORY:   Ms. Nobles is a 89 y.o. female who  has a past medical history of Acute CVA (cerebrovascular accident) (HCC), Arthritis, Chest pain, Dysphagia, Edema, Esophageal dysmotility, Hypercholesterolemia, Hypertension, Other ill-defined conditions(799.89), Shingles, Stroke ([facility]), and Type 2 diabetes mellitus without complication, without long-term current use of insulin ([facility]).  She also  has a past surgical history that includes Colonoscopy (10/03); Hysterectomy; Appendectomy; Colonoscopy (N/A, 2023); and Upper gastrointestinal endoscopy (N/A, 2023).    ASSESSMENT/PLAN OF CARE   Based on the objective data described below, Ms. Nobles presents with a functional oropharyngeal swallow. Adequate airway protection with no aspiration or penetration observed. Varying degrees of pharyngeal residue post-swallow but pharynx was cleared by end of the study.     RECOMMENDATIONS AND PLANNED INTERVENTIONS:  DIET:   regular consistency  thin liquids   MEDICATIONS: as tolerated    Compensatory Swallowing Strategies/Modifications:  Upright for all PO  Small bites/sips    GENERAL    Ms. Nobles was accompanied by her friend, she is  visually impaired. Patient reports CVA 10/2023 with impacts on swallowing, occasionally coughing/choking with liquids and solids. She previously received speech therapy services though the duration/extent is unclear. Patient reported she consistently uses the compensatory strategy of turning her head to the left during the swallow per previous SLP rec.      Present dysphagia symptoms: She currently complains of choking/strangling infrequently .  Current dietary status prior to evaluation today: regular consistency and thin liquids.  Previous dysphagia or speech therapy intervention (including previous MBS studies): None    OBJECTIVE    Modified barium swallow study was performed in the radiology suite using c-arm with Ms. Nobles in the lateral plane.    Oral Motor Assessment  Labial: no impairment  Lingual: no impairment  Dentition: natural and adequate  Oral Hygiene: adequate  Vocal quality: WFL  Volitional cough: present and strong    PO trials  Patient was presented with trials of thin liquids (by spoon, cup sip, cup gulp, and straw sip), pudding, mixed consistency, and cracker. Trialed thin liquids with and without turning head to the left. There was no change in function.     Oral phase:  no significant oral issues observed  Mildly prolonged mastication     Pharyngeal phase:  Swallows of thin liquids were triggered at pyriform sinuses. No laryngeal penetration or aspiration was observed. Residue was observed after the swallow at base of tongue and in pyriform sinuses that was slight.  .  Swallows of pudding were piecemealed and triggered at valleculae. No laryngeal penetration or aspiration was observed. Residue was observed after the swallow in valleculae and in pyriform sinuses that was mild to moderate.  Swallows of mixed consistencies were triggered at pyriform sinuses. No laryngeal penetration or aspiration was observed. Residue was observed after the swallow at base of tongue and in pyriform sinuses that

## 2024-04-11 NOTE — ED PROVIDER NOTES
Emergency Department Provider Note       PCP: Love Perera MD   Age: 89 y.o.   Sex: female     DISPOSITION Decision To Discharge 04/11/2024 03:52:01 PM       ICD-10-CM    1. Urinary tract infection without hematuria, site unspecified  N39.0           Medical Decision Making     Patient is a 89-year-old female who presents with complaint of concern for fatigue.  She has been feeling just not herself for the last couple of days slightly lightheaded when she stands.  Her friend who comes to check on her every morning to check her blood sugar and give her her medications was unable to adequately check her blood sugar.  She states she poked her fingers 5 different times but could not get a sample of her blood.  This concerned her friend that maybe patient's hemoglobin was low because she did have that happened in the past where she required a transfusion.  She is afebrile, nontoxic in appearance, vital signs within appropriate limits.  She is awake and alert acting per usual.  Will check CBC, CMP, urinalysis.    Labs Reviewed   COMPREHENSIVE METABOLIC PANEL - Abnormal; Notable for the following components:       Result Value    Glucose 104 (*)     Est, Glom Filt Rate 54 (*)     All other components within normal limits   POCT URINALYSIS DIPSTICK - Abnormal; Notable for the following components:    Specific Gravity, Urine, POC 1.025 (*)     Protein, Urine, POC 30 (*)     Blood, UA POC Trace Intact (*)     Nitrite, Urine, POC Positive (*)     Leukocyte Est, UA POC SMALL (*)     All other components within normal limits   CULTURE, URINE   CBC WITH AUTO DIFFERENTIAL   URINALYSIS, MICRO   CBC and CMP essentially unremarkable.  Urinalysis does show findings consistent with infection and therefore will be sent for culture.  She will be discharged home with 7-day course of cefdinir.  Patient's history does show that she has a penicillin allergy but when I spoke with patient regarding this she states that that was a

## 2024-04-11 NOTE — ED TRIAGE NOTES
Her friend went to get blood to check her glucose but could not get blood from her fingers. Patient has had low hemaglobin in the past and they are worried something is off again.     Patient feels lethargic and is feeling light headed.     Patient had a stroke in October of 2023.    Patient is visually impaired.

## 2024-04-11 NOTE — DISCHARGE INSTRUCTIONS
You have a urinary tract infection.  We are sending you home with a medication called cefdinir which is an antibiotic that you take twice daily for the next 7 days.  Please make sure you are drinking plenty of fluids and staying hydrated.  We would recommend that you follow-up with your doctor early next week for reevaluation of your symptoms.  Return immediately to the ER with any worsening symptoms such as high fevers, confusion, abdominal pain.

## 2024-04-14 LAB
BACTERIA SPEC CULT: ABNORMAL
BACTERIA SPEC CULT: ABNORMAL
SERVICE CMNT-IMP: ABNORMAL

## 2024-04-15 ENCOUNTER — TELEPHONE (OUTPATIENT)
Dept: ENT CLINIC | Age: 89
End: 2024-04-15

## 2024-04-15 ENCOUNTER — CARE COORDINATION (OUTPATIENT)
Dept: CARE COORDINATION | Facility: CLINIC | Age: 89
End: 2024-04-15

## 2024-04-15 LAB
BACTERIA SPEC CULT: ABNORMAL
BACTERIA SPEC CULT: ABNORMAL
SERVICE CMNT-IMP: ABNORMAL

## 2024-04-15 NOTE — CARE COORDINATION
Ambulatory Care Coordination Note     4/15/2024 10:32 AM     patient  outreach attempt by this ACM today to follow up ED visit from 4/11/2024.  ACM was unable to reach the patient  by telephone today; left voice message requesting a return phone call to this ACM.    Care Summary Note: Patient in the ED 4/11 with concern by her caregiver (per notes) because they were unable to get a good fingerstick for BS.  Found to have UTI without hematuria.  Unable to reach - left VM  Will repeat outreach pending return call     ACM: Jaclyn Chavira RN     Challenges to be reviewed by the provider   Additional needs identified to be addressed with provider Yes  Please consider appointment prior to currently scheduled June appointment               Method of communication with provider: chart routing.    Offered patient enrollment in the Remote Patient Monitoring (RPM) program for in-home monitoring:  UTR .     PCP/Specialist follow up:   Future Appointments         Provider Specialty Dept Phone    4/22/2024 3:40 PM Michael Saldana Jr., PA Neurology [phone]    6/13/2024 1:20 PM Love Perera MD Internal Medicine [phone]    2/5/2025 11:00 AM CHAIR 1 Neurology [phone]

## 2024-04-16 DIAGNOSIS — I10 PRIMARY HYPERTENSION: ICD-10-CM

## 2024-04-17 RX ORDER — LISINOPRIL 20 MG/1
20 TABLET ORAL DAILY
Qty: 90 TABLET | Refills: 3 | Status: SHIPPED | OUTPATIENT
Start: 2024-04-17

## 2024-04-22 ENCOUNTER — OFFICE VISIT (OUTPATIENT)
Dept: NEUROLOGY | Age: 89
End: 2024-04-22
Payer: MEDICARE

## 2024-04-22 VITALS
OXYGEN SATURATION: 96 % | DIASTOLIC BLOOD PRESSURE: 74 MMHG | HEIGHT: 61 IN | SYSTOLIC BLOOD PRESSURE: 138 MMHG | BODY MASS INDEX: 20.96 KG/M2 | WEIGHT: 111 LBS | HEART RATE: 66 BPM

## 2024-04-22 DIAGNOSIS — E11.9 TYPE 2 DIABETES MELLITUS WITHOUT COMPLICATION, WITHOUT LONG-TERM CURRENT USE OF INSULIN (HCC): ICD-10-CM

## 2024-04-22 DIAGNOSIS — E78.5 HYPERLIPIDEMIA, UNSPECIFIED HYPERLIPIDEMIA TYPE: ICD-10-CM

## 2024-04-22 DIAGNOSIS — I63.81 CEREBROVASCULAR ACCIDENT (CVA) DUE TO OCCLUSION OF SMALL ARTERY (HCC): Primary | ICD-10-CM

## 2024-04-22 DIAGNOSIS — I10 PRIMARY HYPERTENSION: ICD-10-CM

## 2024-04-22 PROCEDURE — 1036F TOBACCO NON-USER: CPT | Performed by: PHYSICAL THERAPIST

## 2024-04-22 PROCEDURE — 1090F PRES/ABSN URINE INCON ASSESS: CPT | Performed by: PHYSICAL THERAPIST

## 2024-04-22 PROCEDURE — G8427 DOCREV CUR MEDS BY ELIG CLIN: HCPCS | Performed by: PHYSICAL THERAPIST

## 2024-04-22 PROCEDURE — G8420 CALC BMI NORM PARAMETERS: HCPCS | Performed by: PHYSICAL THERAPIST

## 2024-04-22 PROCEDURE — 99214 OFFICE O/P EST MOD 30 MIN: CPT | Performed by: PHYSICAL THERAPIST

## 2024-04-22 PROCEDURE — 3044F HG A1C LEVEL LT 7.0%: CPT | Performed by: PHYSICAL THERAPIST

## 2024-04-22 PROCEDURE — 1123F ACP DISCUSS/DSCN MKR DOCD: CPT | Performed by: PHYSICAL THERAPIST

## 2024-04-22 NOTE — PROGRESS NOTES
[facility] [address], [address]  [place] [address]  [phone]      Chief Complaint   Patient presents with    Follow-up     CVA       HPI  Brenda Nobles is a 89 y.o. female who presents for follow-up of her history of CVA      Interval History  Patient is accompanied by her sister today.  She reports she is doing very well, has finished her home health therapy she is now doing group fitness classes.  Her left leg is still a little weaker, but overall feels like she is recovering very well.     Past Medical History:   Diagnosis Date    Acute CVA (cerebrovascular accident) (HCC) 10/27/2023    Arthritis     Chest pain 9/11/2014    Dysphagia 9/11/2014    Edema 9/11/2014    Esophageal dysmotility 9/12/2014    Hypercholesterolemia     Hypertension 9/11/2014    Other ill-defined conditions(799.89)     high cholesterol    Shingles     Stroke (HCC)     Type 2 diabetes mellitus without complication, without long-term current use of insulin ([facility]) 12/22/2023       Past Surgical History:   Procedure Laterality Date    APPENDECTOMY      incidental    COLONOSCOPY  10/03    COLONOSCOPY N/A 1/30/2023    COLONOSCOPY DIAGNOSTIC performed by Shady Olivares MD at [facility]    HYSTERECTOMY (CERVIX STATUS UNKNOWN)      UPPER GASTROINTESTINAL ENDOSCOPY N/A 1/30/2023    EGD BIOPSY performed by Shady Olivares MD at [facility]       Family History   Problem Relation Age of Onset    Breast Cancer Mother 40       Social History     Socioeconomic History    Marital status:      Spouse name: None    Number of children: None    Years of education: None    Highest education level: None   Tobacco Use    Smoking status: Never     Passive exposure: Never    Smokeless tobacco: Never   Vaping Use    Vaping Use: Never used   Substance and Sexual Activity    Alcohol use: Yes    Drug use: No    Sexual activity: Not Currently     Social Determinants of Health     Financial Resource Strain: Medium

## 2024-04-23 ENCOUNTER — TELEPHONE (OUTPATIENT)
Dept: NEUROLOGY | Age: 89
End: 2024-04-23

## 2024-04-23 ASSESSMENT — ENCOUNTER SYMPTOMS
EYE PAIN: 0
TROUBLE SWALLOWING: 0
RESPIRATORY NEGATIVE: 1

## 2024-04-23 NOTE — TELEPHONE ENCOUNTER
Prior Authorization for [employer] approved.  This approval authorizes your coverage from 01/01/2024 - 04/22/2025, unless we notify you otherwise, and as long as the following conditions apply:   ? you remain enrolled in our Medicare Part D prescription drug plan,   ? your physician or other prescriber continues to prescribe the medication for you, and   ? the medication continues to be safe for treating your condition.

## 2024-05-01 ENCOUNTER — OFFICE VISIT (OUTPATIENT)
Dept: INTERNAL MEDICINE CLINIC | Facility: CLINIC | Age: 89
End: 2024-05-01

## 2024-05-01 VITALS
HEART RATE: 70 BPM | WEIGHT: 112 LBS | BODY MASS INDEX: 21.14 KG/M2 | SYSTOLIC BLOOD PRESSURE: 156 MMHG | OXYGEN SATURATION: 97 % | DIASTOLIC BLOOD PRESSURE: 82 MMHG | HEIGHT: 61 IN

## 2024-05-01 DIAGNOSIS — E11.9 TYPE 2 DIABETES MELLITUS WITHOUT COMPLICATION, WITHOUT LONG-TERM CURRENT USE OF INSULIN (HCC): ICD-10-CM

## 2024-05-01 DIAGNOSIS — E78.5 HYPERLIPIDEMIA, UNSPECIFIED HYPERLIPIDEMIA TYPE: Primary | ICD-10-CM

## 2024-05-01 PROBLEM — Z86.73 HISTORY OF CVA (CEREBROVASCULAR ACCIDENT): Status: ACTIVE | Noted: 2023-10-27

## 2024-05-01 ASSESSMENT — PATIENT HEALTH QUESTIONNAIRE - PHQ9
SUM OF ALL RESPONSES TO PHQ QUESTIONS 1-9: 0
1. LITTLE INTEREST OR PLEASURE IN DOING THINGS: NOT AT ALL
2. FEELING DOWN, DEPRESSED OR HOPELESS: NOT AT ALL
SUM OF ALL RESPONSES TO PHQ9 QUESTIONS 1 & 2: 0
SUM OF ALL RESPONSES TO PHQ QUESTIONS 1-9: 0

## 2024-05-01 NOTE — PROGRESS NOTES
COLONOSCOPY DIAGNOSTIC performed by Shady Olivares MD at [facility]    HYSTERECTOMY (CERVIX STATUS UNKNOWN)      UPPER GASTROINTESTINAL ENDOSCOPY N/A 1/30/2023    EGD BIOPSY performed by Shady Olivares MD at [facility]     Family History   Problem Relation Age of Onset    Breast Cancer Mother 40     Social History     Tobacco Use   Smoking Status Never    Passive exposure: Never   Smokeless Tobacco Never      Social History     Substance and Sexual Activity   Alcohol Use Yes      Social History     Substance and Sexual Activity   Drug Use No      Allergies as of 05/01/2024 - Fully Reviewed 05/01/2024   Allergen Reaction Noted    Atorvastatin Other (See Comments) 09/11/2014    Ezetimibe-simvastatin Other (See Comments) 09/11/2014    Penicillins  10/27/2023    Sulfa antibiotics Rash 07/26/2008       Review of Systems    Objective:    Vitals:    05/01/24 1536   BP: (!) 156/82   Site: Left Upper Arm   Position: Sitting   Cuff Size: Child   Pulse: 70   SpO2: 97%   Weight: 50.8 kg (112 lb)   Height: 1.549 m (5' 1\")        Physical Exam  Constitutional:       General: She is not in acute distress.     Appearance: Normal appearance.   HENT:      Head: Normocephalic and atraumatic.   Eyes:      Extraocular Movements: Extraocular movements intact.      Conjunctiva/sclera: Conjunctivae normal.   Cardiovascular:      Rate and Rhythm: Normal rate and regular rhythm.      Heart sounds: No murmur heard.  Pulmonary:      Effort: Pulmonary effort is normal.      Breath sounds: Normal breath sounds. No wheezing, rhonchi or rales.   Skin:     General: Skin is warm and dry.   Neurological:      Mental Status: She is alert. Mental status is at baseline.   Psychiatric:         Mood and Affect: Mood normal.         Behavior: Behavior normal.         Assessent & Plan    1. Hyperlipidemia, unspecified hyperlipidemia type  -     Comprehensive Metabolic Panel; Future  -     CBC with Auto Differential; Future  -     TSH with

## 2024-05-06 DIAGNOSIS — I63.81 CEREBROVASCULAR ACCIDENT (CVA) DUE TO OCCLUSION OF SMALL ARTERY (HCC): Primary | ICD-10-CM

## 2024-05-11 ENCOUNTER — HOSPITAL ENCOUNTER (EMERGENCY)
Age: 89
Discharge: HOME | End: 2024-05-12
Attending: EMERGENCY MEDICINE
Payer: MEDICARE

## 2024-05-11 ENCOUNTER — APPOINTMENT (OUTPATIENT)
Dept: GENERAL RADIOLOGY | Age: 89
End: 2024-05-11
Payer: MEDICARE

## 2024-05-11 DIAGNOSIS — R11.10 VOMITING, UNSPECIFIED VOMITING TYPE, UNSPECIFIED WHETHER NAUSEA PRESENT: ICD-10-CM

## 2024-05-11 DIAGNOSIS — N39.0 ACUTE UTI: Primary | ICD-10-CM

## 2024-05-11 PROCEDURE — 81001 URINALYSIS AUTO W/SCOPE: CPT

## 2024-05-11 PROCEDURE — 86900 BLOOD TYPING SEROLOGIC ABO: CPT

## 2024-05-11 PROCEDURE — 81003 URINALYSIS AUTO W/O SCOPE: CPT

## 2024-05-11 PROCEDURE — 86870 RBC ANTIBODY IDENTIFICATION: CPT

## 2024-05-11 PROCEDURE — 86850 RBC ANTIBODY SCREEN: CPT

## 2024-05-11 PROCEDURE — 87088 URINE BACTERIA CULTURE: CPT

## 2024-05-11 PROCEDURE — 87086 URINE CULTURE/COLONY COUNT: CPT

## 2024-05-11 PROCEDURE — 86901 BLOOD TYPING SEROLOGIC RH(D): CPT

## 2024-05-11 PROCEDURE — 83605 ASSAY OF LACTIC ACID: CPT

## 2024-05-11 PROCEDURE — 83735 ASSAY OF MAGNESIUM: CPT

## 2024-05-11 PROCEDURE — 87186 SC STD MICRODIL/AGAR DIL: CPT

## 2024-05-11 PROCEDURE — 93005 ELECTROCARDIOGRAM TRACING: CPT | Performed by: EMERGENCY MEDICINE

## 2024-05-11 PROCEDURE — 83690 ASSAY OF LIPASE: CPT

## 2024-05-11 PROCEDURE — 74022 RADEX COMPL AQT ABD SERIES: CPT

## 2024-05-11 PROCEDURE — 80053 COMPREHEN METABOLIC PANEL: CPT

## 2024-05-11 PROCEDURE — 85610 PROTHROMBIN TIME: CPT

## 2024-05-11 PROCEDURE — 85025 COMPLETE CBC W/AUTO DIFF WBC: CPT

## 2024-05-11 PROCEDURE — 99285 EMERGENCY DEPT VISIT HI MDM: CPT

## 2024-05-11 ASSESSMENT — LIFESTYLE VARIABLES: HOW OFTEN DO YOU HAVE A DRINK CONTAINING ALCOHOL: NEVER

## 2024-05-12 VITALS
SYSTOLIC BLOOD PRESSURE: 146 MMHG | BODY MASS INDEX: 21.71 KG/M2 | OXYGEN SATURATION: 98 % | DIASTOLIC BLOOD PRESSURE: 80 MMHG | TEMPERATURE: 98 F | HEART RATE: 81 BPM | HEIGHT: 61 IN | WEIGHT: 115 LBS | RESPIRATION RATE: 23 BRPM

## 2024-05-12 LAB
ABO + RH BLD: NORMAL
ALBUMIN SERPL-MCNC: 3.6 G/DL (ref 3.2–4.6)
ALBUMIN/GLOB SERPL: 1.6 (ref 1–1.9)
ALP SERPL-CCNC: 50 U/L (ref 35–104)
ALT SERPL-CCNC: 27 U/L (ref 12–65)
ANION GAP SERPL CALC-SCNC: 12 MMOL/L (ref 9–18)
APPEARANCE UR: CLEAR
AST SERPL-CCNC: 34 U/L (ref 15–37)
BACTERIA URNS QL MICRO: ABNORMAL /HPF
BASOPHILS # BLD: 0 K/UL (ref 0–0.2)
BASOPHILS NFR BLD: 0 % (ref 0–2)
BILIRUB SERPL-MCNC: 0.4 MG/DL (ref 0–1.2)
BILIRUB UR QL: NEGATIVE
BLOOD BANK CMNT PATIENT-IMP: NORMAL
BLOOD GROUP ANTIBODIES SERPL: NORMAL
BUN SERPL-MCNC: 22 MG/DL (ref 8–23)
CALCIUM SERPL-MCNC: 9.3 MG/DL (ref 8.8–10.2)
CASTS URNS QL MICRO: ABNORMAL /LPF
CHLORIDE SERPL-SCNC: 100 MMOL/L (ref 98–107)
CO2 SERPL-SCNC: 25 MMOL/L (ref 20–28)
COLOR UR: ABNORMAL
CREAT SERPL-MCNC: 0.77 MG/DL (ref 0.6–1.1)
DIFFERENTIAL METHOD BLD: ABNORMAL
EKG ATRIAL RATE: 72 BPM
EKG DIAGNOSIS: NORMAL
EKG P AXIS: -16 DEGREES
EKG P-R INTERVAL: 141 MS
EKG Q-T INTERVAL: 429 MS
EKG QRS DURATION: 121 MS
EKG QTC CALCULATION (BAZETT): 467 MS
EKG R AXIS: -41 DEGREES
EKG T AXIS: 80 DEGREES
EKG VENTRICULAR RATE: 71 BPM
EOSINOPHIL # BLD: 0 K/UL (ref 0–0.8)
EOSINOPHIL NFR BLD: 0 % (ref 0.5–7.8)
EPI CELLS #/AREA URNS HPF: ABNORMAL /HPF
ERYTHROCYTE [DISTWIDTH] IN BLOOD BY AUTOMATED COUNT: 13 % (ref 11.9–14.6)
GLOBULIN SER CALC-MCNC: 2.3 G/DL (ref 2.3–3.5)
GLUCOSE SERPL-MCNC: 121 MG/DL (ref 70–99)
GLUCOSE UR STRIP.AUTO-MCNC: 100 MG/DL
HCT VFR BLD AUTO: 38.3 % (ref 35.8–46.3)
HGB BLD-MCNC: 13.2 G/DL (ref 11.7–15.4)
HGB UR QL STRIP: ABNORMAL
IMM GRANULOCYTES # BLD AUTO: 0.1 K/UL (ref 0–0.5)
IMM GRANULOCYTES NFR BLD AUTO: 1 % (ref 0–5)
INR PPP: 1
KETONES UR QL STRIP.AUTO: NEGATIVE MG/DL
LACTATE SERPL-SCNC: 1.9 MMOL/L (ref 0.5–2)
LEUKOCYTE ESTERASE UR QL STRIP.AUTO: ABNORMAL
LIPASE SERPL-CCNC: 43 U/L (ref 13–60)
LYMPHOCYTES # BLD: 1.3 K/UL (ref 0.5–4.6)
LYMPHOCYTES NFR BLD: 19 % (ref 13–44)
MAGNESIUM SERPL-MCNC: 1.9 MG/DL (ref 1.8–2.4)
MCH RBC QN AUTO: 31.2 PG (ref 26.1–32.9)
MCHC RBC AUTO-ENTMCNC: 34.5 G/DL (ref 31.4–35)
MCV RBC AUTO: 90.5 FL (ref 82–102)
MONOCYTES # BLD: 0.6 K/UL (ref 0.1–1.3)
MONOCYTES NFR BLD: 8 % (ref 4–12)
NEUTS SEG # BLD: 4.9 K/UL (ref 1.7–8.2)
NEUTS SEG NFR BLD: 72 % (ref 43–78)
NITRITE UR QL STRIP.AUTO: POSITIVE
NRBC # BLD: 0 K/UL (ref 0–0.2)
PH UR STRIP: 5.5 (ref 5–9)
PLATELET # BLD AUTO: 219 K/UL (ref 150–450)
PMV BLD AUTO: 11.2 FL (ref 9.4–12.3)
POTASSIUM SERPL-SCNC: 4.4 MMOL/L (ref 3.5–5.1)
PROT SERPL-MCNC: 6 G/DL (ref 6.3–8.2)
PROT UR STRIP-MCNC: 30 MG/DL
PROTHROMBIN TIME: 13.5 SEC (ref 11.3–14.9)
RBC # BLD AUTO: 4.23 M/UL (ref 4.05–5.2)
RBC #/AREA URNS HPF: ABNORMAL /HPF
SODIUM SERPL-SCNC: 137 MMOL/L (ref 136–145)
SP GR UR REFRACTOMETRY: 1.02 (ref 1–1.02)
SPECIMEN EXP DATE BLD: NORMAL
UROBILINOGEN UR QL STRIP.AUTO: 1 EU/DL (ref 0.2–1)
WBC # BLD AUTO: 6.8 K/UL (ref 4.3–11.1)
WBC URNS QL MICRO: ABNORMAL /HPF

## 2024-05-12 PROCEDURE — 6370000000 HC RX 637 (ALT 250 FOR IP): Performed by: EMERGENCY MEDICINE

## 2024-05-12 PROCEDURE — 93010 ELECTROCARDIOGRAM REPORT: CPT | Performed by: INTERNAL MEDICINE

## 2024-05-12 RX ORDER — CEPHALEXIN 500 MG/1
500 CAPSULE ORAL 2 TIMES DAILY
Qty: 14 CAPSULE | Refills: 0 | Status: SHIPPED | OUTPATIENT
Start: 2024-05-12 | End: 2024-05-19

## 2024-05-12 RX ORDER — ONDANSETRON 4 MG/1
4 TABLET, FILM COATED ORAL 3 TIMES DAILY PRN
Qty: 15 TABLET | Refills: 2 | Status: SHIPPED | OUTPATIENT
Start: 2024-05-12

## 2024-05-12 RX ORDER — CEPHALEXIN 500 MG/1
500 CAPSULE ORAL
Status: COMPLETED | OUTPATIENT
Start: 2024-05-12 | End: 2024-05-12

## 2024-05-12 RX ADMIN — CEPHALEXIN 500 MG: 500 CAPSULE ORAL at 01:26

## 2024-05-12 NOTE — ED PROVIDER NOTES
Emergency Department Provider Note       PCP: Love Perera MD   Age: 89 y.o.   Sex: female     DISPOSITION Decision To Discharge 05/12/2024 01:13:52 AM       ICD-10-CM    1. Acute UTI  N39.0       2. Vomiting, unspecified vomiting type, unspecified whether nausea present  R11.10           Medical Decision Making     Patient comes to the ED for evaluation of reported hematemesis and black stool, first noted by caretaker yesterday.  Patient recently received Repatha injections by PCP 2 days ago.  States the day after those injections she developed vomiting and was told there was blood in her emesis.  Patient also told that she had melena.  Denies history of GI bleed in the past.  Patient without abdominal pain at this time.  Patient states that she \"slipped\" from her bed onto the floor, where she was found sleeping on the floor by her caretaker.  Patient denies any pain at this time.  She is awake and alert, without focal motor or sensory deficits.  Patient denies any CP/SOB.  On exam, her abdomen is soft, NTTP.  Rectal exam with soft brown stool that was Hemoccult negative.  Patient with stable vital signs.  Patient with recent UTI, for which she finished a course of antibiotics    CBC without leukocytosis or anemia.  Urinalysis with UTI.  Urine culture ordered.  CMP unremarkable.  Lipase within normal limits.  Magnesium within normal limits.  Lactic acid within normal limits.  Patient started on Keflex while in the ED.  Chest x-ray with abdominal series is unremarkable.  Patient stable for DC home.  Will give prescription for Keflex and Zofran.  Strict return precautions discussed.     1 acute illness with systemic symptoms.    Over the counter drug management performed.  Prescription drug management performed.  Shared medical decision making was utilized in creating the patients health plan today.    I independently ordered and reviewed each unique test.    I reviewed external records: provider visit note

## 2024-05-12 NOTE — ED TRIAGE NOTES
Care taker noted dark vomit and stool and wants her to be checked out/ pt fell today- \"slipped out of bed\"

## 2024-05-12 NOTE — DISCHARGE INSTRUCTIONS
If you have uncontrollable vomiting, develop abdominal pain, have a fever, feel dizzy, pass out, or if you have any other concerning symptoms, please return to the ER immediately.

## 2024-05-12 NOTE — FLOWSHEET NOTE
Patient mobility status  with no difficulty. Provider aware     I have reviewed discharge instructions with the caregiver.  The caregiver verbalized understanding.    Patient left ED via Discharge Method: stretcher to Home with Friend.    Opportunity for questions and clarification provided.     Patient given 2 scripts.

## 2024-05-13 ENCOUNTER — CARE COORDINATION (OUTPATIENT)
Dept: CARE COORDINATION | Facility: CLINIC | Age: 89
End: 2024-05-13

## 2024-05-13 NOTE — CARE COORDINATION
Ambulatory Care Coordination Note     5/13/2024 10:23 AM     Outreach in follow up to ED visit yesterday 5/12/2024.  Unable to reach, left VM  Presented with hematemesis and black stool, noted subsequently s/p repatha injections 2 days prior.     ACM: Jaclyn Chavira, RN    PCP/Specialist follow up:   Future Appointments         Provider Specialty Dept Phone    9/3/2024 1:40 PM Love Perera MD Internal Medicine [phone]    10/28/2024 3:20 PM Michael Saldana Jr. PA Neurology [phone]    2/5/2025 11:00 AM CHAIR 1 Neurology [phone]

## 2024-05-15 LAB
BACTERIA SPEC CULT: ABNORMAL
BACTERIA SPEC CULT: ABNORMAL
SERVICE CMNT-IMP: ABNORMAL

## 2024-05-21 ENCOUNTER — PATIENT MESSAGE (OUTPATIENT)
Dept: INTERNAL MEDICINE CLINIC | Facility: CLINIC | Age: 89
End: 2024-05-21

## 2024-05-21 DIAGNOSIS — N39.0 URINARY TRACT INFECTION WITHOUT HEMATURIA, SITE UNSPECIFIED: Primary | ICD-10-CM

## 2024-05-30 ENCOUNTER — NURSE ONLY (OUTPATIENT)
Dept: INTERNAL MEDICINE CLINIC | Facility: CLINIC | Age: 89
End: 2024-05-30
Payer: MEDICARE

## 2024-05-30 DIAGNOSIS — N39.0 URINARY TRACT INFECTION WITHOUT HEMATURIA, SITE UNSPECIFIED: Primary | ICD-10-CM

## 2024-05-30 DIAGNOSIS — N39.0 URINARY TRACT INFECTION WITHOUT HEMATURIA, SITE UNSPECIFIED: ICD-10-CM

## 2024-05-30 LAB
BILIRUBIN, URINE, POC: NEGATIVE
BLOOD URINE, POC: ABNORMAL
GLUCOSE URINE, POC: NEGATIVE
KETONES, URINE, POC: NEGATIVE
LEUKOCYTE ESTERASE, URINE, POC: ABNORMAL
NITRITE, URINE, POC: POSITIVE
PH, URINE, POC: 6 (ref 4.6–8)
PROTEIN,URINE, POC: 30
SPECIFIC GRAVITY, URINE, POC: 1.03 (ref 1–1.03)
URINALYSIS CLARITY, POC: ABNORMAL
URINALYSIS COLOR, POC: YELLOW
UROBILINOGEN, POC: ABNORMAL

## 2024-05-30 PROCEDURE — 81002 URINALYSIS NONAUTO W/O SCOPE: CPT | Performed by: STUDENT IN AN ORGANIZED HEALTH CARE EDUCATION/TRAINING PROGRAM

## 2024-05-30 NOTE — PROGRESS NOTES
Per Martha with the lab pt unable to collect enough urine for the test ordered. Per Dr Dilma camara to order POC urine dip and also urine culture. Orders placed and Belinda CONTRERAS went to the lab to rune the POC urine dip.

## 2024-06-03 LAB
BACTERIA SPEC CULT: ABNORMAL
BACTERIA SPEC CULT: ABNORMAL
SERVICE CMNT-IMP: ABNORMAL

## 2024-06-04 ENCOUNTER — OFFICE VISIT (OUTPATIENT)
Dept: INTERNAL MEDICINE CLINIC | Facility: CLINIC | Age: 89
End: 2024-06-04
Payer: MEDICARE

## 2024-06-04 VITALS
TEMPERATURE: 97.2 F | HEIGHT: 61 IN | RESPIRATION RATE: 16 BRPM | HEART RATE: 73 BPM | OXYGEN SATURATION: 97 % | SYSTOLIC BLOOD PRESSURE: 130 MMHG | DIASTOLIC BLOOD PRESSURE: 63 MMHG | WEIGHT: 108 LBS | BODY MASS INDEX: 20.39 KG/M2

## 2024-06-04 DIAGNOSIS — R32 URINARY INCONTINENCE, UNSPECIFIED TYPE: ICD-10-CM

## 2024-06-04 DIAGNOSIS — E11.65 TYPE 2 DIABETES MELLITUS WITH HYPERGLYCEMIA, WITHOUT LONG-TERM CURRENT USE OF INSULIN (HCC): ICD-10-CM

## 2024-06-04 DIAGNOSIS — N39.0 RECURRENT UTI: Primary | ICD-10-CM

## 2024-06-04 PROCEDURE — G8427 DOCREV CUR MEDS BY ELIG CLIN: HCPCS | Performed by: STUDENT IN AN ORGANIZED HEALTH CARE EDUCATION/TRAINING PROGRAM

## 2024-06-04 PROCEDURE — 99214 OFFICE O/P EST MOD 30 MIN: CPT | Performed by: STUDENT IN AN ORGANIZED HEALTH CARE EDUCATION/TRAINING PROGRAM

## 2024-06-04 PROCEDURE — G8420 CALC BMI NORM PARAMETERS: HCPCS | Performed by: STUDENT IN AN ORGANIZED HEALTH CARE EDUCATION/TRAINING PROGRAM

## 2024-06-04 PROCEDURE — 1036F TOBACCO NON-USER: CPT | Performed by: STUDENT IN AN ORGANIZED HEALTH CARE EDUCATION/TRAINING PROGRAM

## 2024-06-04 PROCEDURE — 3044F HG A1C LEVEL LT 7.0%: CPT | Performed by: STUDENT IN AN ORGANIZED HEALTH CARE EDUCATION/TRAINING PROGRAM

## 2024-06-04 PROCEDURE — 0509F URINE INCON PLAN DOCD: CPT | Performed by: STUDENT IN AN ORGANIZED HEALTH CARE EDUCATION/TRAINING PROGRAM

## 2024-06-04 PROCEDURE — 1090F PRES/ABSN URINE INCON ASSESS: CPT | Performed by: STUDENT IN AN ORGANIZED HEALTH CARE EDUCATION/TRAINING PROGRAM

## 2024-06-04 PROCEDURE — 1123F ACP DISCUSS/DSCN MKR DOCD: CPT | Performed by: STUDENT IN AN ORGANIZED HEALTH CARE EDUCATION/TRAINING PROGRAM

## 2024-06-04 RX ORDER — ACYCLOVIR 400 MG/1
TABLET ORAL
Qty: 3 EACH | Refills: 3 | Status: SHIPPED | OUTPATIENT
Start: 2024-06-04

## 2024-06-04 RX ORDER — CIPROFLOXACIN 250 MG/1
250 TABLET, FILM COATED ORAL 2 TIMES DAILY
Qty: 14 TABLET | Refills: 0 | Status: SHIPPED | OUTPATIENT
Start: 2024-06-04 | End: 2024-06-11

## 2024-06-04 RX ORDER — ACYCLOVIR 400 MG/1
TABLET ORAL
Qty: 1 EACH | Refills: 0 | Status: SHIPPED | OUTPATIENT
Start: 2024-06-04

## 2024-06-04 NOTE — PROGRESS NOTES
FOLLOW UP VISIT    Subjective:    Brenda Nobles (: 1932) is a 89 y.o., female,   Chief Complaint   Patient presents with    Follow-Up from Hospital       HPI:    Denies burning urination, she urinates about every hour at night. Does have urinary incontinence and wears depends. No fever, chills  Patient's son sent a message that UTI makes her miserable however patient doesn't feel that she feels misterable today.    Seen in ED 24 for lethargy and treated for UTI with cefdinir, then ED 24 with nausea/vomiting treated for UTI with keflex, now growing same E coli    She is tolerating repatha, insurance won't cover pravastatin, she can't tolerate simvastatin, crestor, lipitor, has tried 5 different statins    The following portions of the patient's history were reviewed and updated as appropriate:      Current Outpatient Medications   Medication Sig Dispense Refill    Continuous Glucose Sensor (DEXCOM G7 SENSOR) MISC Please dispense 3 month supply of CGM supplies 3 each 3    Continuous Glucose  (DEXCOM G7 ) MANUEL Please dispense 1 CGM  1 each 0    ciprofloxacin (CIPRO) 250 MG tablet Take 1 tablet by mouth 2 times daily for 7 days 14 tablet 0    Evolocumab (REPATHA) SOSY syringe Inject 3 mLs into the skin every 30 days 3 mL 11    lisinopril (PRINIVIL;ZESTRIL) 20 MG tablet TAKE ONE TABLET BY MOUTH ONE TIME DAILY 90 tablet 3    fluticasone (FLONASE) 50 MCG/ACT nasal spray 2 sprays by Each Nostril route daily 48 g 11    pravastatin (PRAVACHOL) 20 MG tablet Take 1 tablet by mouth daily 60 tablet 1    omeprazole (PRILOSEC) 20 MG delayed release capsule Take 1 capsule by mouth daily 90 capsule 3    blood glucose monitor strips Freestyle Freedom LiteTest strips. Test daily as directed. DX: E11.9 100 strip 11    Lancets MISC 1 each by Does not apply route daily Freestyle Freedom Lite Lancets. Test daily as directed. DX: E11.9 100 each 11    metFORMIN (GLUCOPHAGE) 500 MG tablet Take 1

## 2024-06-07 ENCOUNTER — TELEPHONE (OUTPATIENT)
Dept: INTERNAL MEDICINE CLINIC | Facility: CLINIC | Age: 89
End: 2024-06-07

## 2024-06-07 NOTE — TELEPHONE ENCOUNTER
Kayla with pt pharmacy called stating that pt family member was at the pharmacy to  the Dexcom. Kayla said that the [employer] dept has not received any thing for our office and that they need office notes. Phone is [phone] fax #  [phone]. Kayla was not sure what other than office notes are needed.  I contacted the above number and spoke with Andre. She advised that all they need is office notes. She gave me a fax number of [phone] attention Medicare CGM.   I have faxed and received conformation that the office notes went through.

## 2024-06-12 ENCOUNTER — TELEPHONE (OUTPATIENT)
Dept: NEUROLOGY | Age: 89
End: 2024-06-12

## 2024-06-12 ENCOUNTER — TELEPHONE (OUTPATIENT)
Dept: INTERNAL MEDICINE CLINIC | Facility: CLINIC | Age: 89
End: 2024-06-12

## 2024-06-12 DIAGNOSIS — E78.5 DYSLIPIDEMIA: Primary | ICD-10-CM

## 2024-06-12 DIAGNOSIS — Z86.73 HISTORY OF CVA (CEREBROVASCULAR ACCIDENT): ICD-10-CM

## 2024-06-12 NOTE — TELEPHONE ENCOUNTER
Patient is hoping you will send an order for a home health nurse visits. She has received her Repatha, but is unable to give herself injections due to blindness. She didn't realize this would be difficult for her to do.   complains of pain/discomfort

## 2024-06-13 ENCOUNTER — HOME HEALTH ADMISSION (OUTPATIENT)
Dept: HOME HEALTH SERVICES | Facility: HOME HEALTH | Age: 89
End: 2024-06-13

## 2024-06-13 NOTE — TELEPHONE ENCOUNTER
Pt advised that dexcom not covered by insurance and if she or a family can contact insurance and ask which meter would be covered and let us know Dr Perera will send in a rx. She voiced understanding.

## 2024-06-14 ENCOUNTER — TELEPHONE (OUTPATIENT)
Dept: NEUROLOGY | Age: 89
End: 2024-06-14

## 2024-06-14 NOTE — TELEPHONE ENCOUNTER
The pt's neighbor called and stated that the pt is blind and unable to give herself the Repatha injections. Pt is asking is it possible for Home Health to come out and give her the injections? Neighbor stated that the pt had a friend give her the injection the first time, but she cannot always count on the friend to be there every month.  Pt is asking for a call back to discuss the plan going forward.

## 2024-07-15 DIAGNOSIS — E11.9 TYPE 2 DIABETES MELLITUS WITHOUT COMPLICATION, WITHOUT LONG-TERM CURRENT USE OF INSULIN (HCC): ICD-10-CM

## 2024-07-18 ENCOUNTER — OFFICE VISIT (OUTPATIENT)
Dept: UROLOGY | Age: 89
End: 2024-07-18
Payer: MEDICARE

## 2024-07-18 DIAGNOSIS — N39.0 RECURRENT UTI: Primary | ICD-10-CM

## 2024-07-18 DIAGNOSIS — N20.0 KIDNEY STONE: ICD-10-CM

## 2024-07-18 DIAGNOSIS — R32 URINARY INCONTINENCE, UNSPECIFIED TYPE: ICD-10-CM

## 2024-07-18 LAB
BILIRUBIN, URINE, POC: NEGATIVE
BLOOD URINE, POC: NORMAL
GLUCOSE URINE, POC: NEGATIVE
KETONES, URINE, POC: NEGATIVE
LEUKOCYTE ESTERASE, URINE, POC: NORMAL
NITRITE, URINE, POC: NEGATIVE
PH, URINE, POC: 5.5 (ref 4.6–8)
PROTEIN,URINE, POC: NORMAL
PVR, POC: 0 CC
SPECIFIC GRAVITY, URINE, POC: 1.02 (ref 1–1.03)
URINALYSIS CLARITY, POC: NORMAL
URINALYSIS COLOR, POC: NORMAL
UROBILINOGEN, POC: NORMAL

## 2024-07-18 PROCEDURE — 1036F TOBACCO NON-USER: CPT | Performed by: UROLOGY

## 2024-07-18 PROCEDURE — 1090F PRES/ABSN URINE INCON ASSESS: CPT | Performed by: UROLOGY

## 2024-07-18 PROCEDURE — G8427 DOCREV CUR MEDS BY ELIG CLIN: HCPCS | Performed by: UROLOGY

## 2024-07-18 PROCEDURE — 0509F URINE INCON PLAN DOCD: CPT | Performed by: UROLOGY

## 2024-07-18 PROCEDURE — 74018 RADEX ABDOMEN 1 VIEW: CPT | Performed by: UROLOGY

## 2024-07-18 PROCEDURE — 51798 US URINE CAPACITY MEASURE: CPT | Performed by: UROLOGY

## 2024-07-18 PROCEDURE — 99204 OFFICE O/P NEW MOD 45 MIN: CPT | Performed by: UROLOGY

## 2024-07-18 PROCEDURE — 1123F ACP DISCUSS/DSCN MKR DOCD: CPT | Performed by: UROLOGY

## 2024-07-18 PROCEDURE — G8420 CALC BMI NORM PARAMETERS: HCPCS | Performed by: UROLOGY

## 2024-07-18 PROCEDURE — 81003 URINALYSIS AUTO W/O SCOPE: CPT | Performed by: UROLOGY

## 2024-07-18 ASSESSMENT — ENCOUNTER SYMPTOMS
COUGH: 0
NAUSEA: 0
BACK PAIN: 0
ABDOMINAL PAIN: 0
CONSTIPATION: 0
VOMITING: 0
EYE PAIN: 0
EYE DISCHARGE: 0
HEARTBURN: 0
SKIN LESIONS: 0
BLOOD IN STOOL: 0
SHORTNESS OF BREATH: 0
DIARRHEA: 0
WHEEZING: 0
INDIGESTION: 0

## 2024-07-18 NOTE — PROGRESS NOTES
[facility]  [address] [address]  [phone]          Brenda Nobles  : 1932    Chief Complaint   Patient presents with    New Patient    Frequent/Recurrent UTI    Urinary Incontinence          HPI     Brenda Nobles is a 89 y.o. female    The patient is referred to us for persistent bacteriuria.  She indicates that she has been asymptomatic as far as dysuria and other pains are concerned.  She has not had febrile UTIs or been admitted with urosepsis.  There is occasional leakage and nocturia 3-4.  She wears 2 depends per day.      Past Medical History:   Diagnosis Date    Acute CVA (cerebrovascular accident) ([facility]) 10/27/2023    Arthritis     Chest pain 2014    Dysphagia 2014    Edema 2014    Esophageal dysmotility 2014    Hypercholesterolemia     Hypertension 2014    Other ill-defined conditions(799.89)     high cholesterol    Shingles     Stroke ([facility])     Type 2 diabetes mellitus without complication, without long-term current use of insulin ([facility]) 2023     Past Surgical History:   Procedure Laterality Date    APPENDECTOMY      incidental    COLONOSCOPY  10/03    COLONOSCOPY N/A 2023    COLONOSCOPY DIAGNOSTIC performed by Shady Olivares MD at [facility]COPY    HYSTERECTOMY (CERVIX STATUS UNKNOWN)      HYSTERECTOMY, TOTAL ABDOMINAL (CERVIX REMOVED)      UPPER GASTROINTESTINAL ENDOSCOPY N/A 2023    EGD BIOPSY performed by Shady Olivares MD at [facility]     Current Outpatient Medications   Medication Sig Dispense Refill    metFORMIN (GLUCOPHAGE) 500 MG tablet TAKE ONE TABLET BY MOUTH TWICE A  tablet 3    Continuous Glucose Sensor (DEXCOM G7 SENSOR) MISC Please dispense 3 month supply of CGM supplies 3 each 3    Continuous Glucose  (DEXCOM G7 ) MANUEL Please dispense 1 CGM  1 each 0    Evolocumab (REPATHA) SOSY syringe Inject 3 mLs into the skin every 30 days 3 mL 11    lisinopril

## 2024-08-27 DIAGNOSIS — E11.9 TYPE 2 DIABETES MELLITUS WITHOUT COMPLICATION, WITHOUT LONG-TERM CURRENT USE OF INSULIN (HCC): ICD-10-CM

## 2024-08-27 DIAGNOSIS — E78.5 HYPERLIPIDEMIA, UNSPECIFIED HYPERLIPIDEMIA TYPE: ICD-10-CM

## 2024-08-27 LAB
ALBUMIN SERPL-MCNC: 4.2 G/DL (ref 3.2–4.6)
ALBUMIN/GLOB SERPL: 1.6 (ref 1–1.9)
ALP SERPL-CCNC: 57 U/L (ref 35–104)
ALT SERPL-CCNC: 14 U/L (ref 12–65)
ANION GAP SERPL CALC-SCNC: 10 MMOL/L (ref 9–18)
AST SERPL-CCNC: 21 U/L (ref 15–37)
BASOPHILS # BLD: 0 K/UL (ref 0–0.2)
BASOPHILS NFR BLD: 1 % (ref 0–2)
BILIRUB SERPL-MCNC: 0.3 MG/DL (ref 0–1.2)
BUN SERPL-MCNC: 17 MG/DL (ref 8–23)
CALCIUM SERPL-MCNC: 10 MG/DL (ref 8.8–10.2)
CHLORIDE SERPL-SCNC: 100 MMOL/L (ref 98–107)
CHOLEST SERPL-MCNC: 235 MG/DL (ref 0–200)
CO2 SERPL-SCNC: 29 MMOL/L (ref 20–28)
CREAT SERPL-MCNC: 0.71 MG/DL (ref 0.6–1.1)
DIFFERENTIAL METHOD BLD: NORMAL
EOSINOPHIL # BLD: 0.2 K/UL (ref 0–0.8)
EOSINOPHIL NFR BLD: 3 % (ref 0.5–7.8)
ERYTHROCYTE [DISTWIDTH] IN BLOOD BY AUTOMATED COUNT: 13.1 % (ref 11.9–14.6)
EST. AVERAGE GLUCOSE BLD GHB EST-MCNC: 128 MG/DL
GLOBULIN SER CALC-MCNC: 2.6 G/DL (ref 2.3–3.5)
GLUCOSE SERPL-MCNC: 152 MG/DL (ref 70–99)
HBA1C MFR BLD: 6.1 % (ref 0–5.6)
HCT VFR BLD AUTO: 44.8 % (ref 35.8–46.3)
HDLC SERPL-MCNC: 51 MG/DL (ref 40–60)
HDLC SERPL: 4.6 (ref 0–5)
HGB BLD-MCNC: 14.7 G/DL (ref 11.7–15.4)
IMM GRANULOCYTES # BLD AUTO: 0 K/UL (ref 0–0.5)
IMM GRANULOCYTES NFR BLD AUTO: 0 % (ref 0–5)
LDLC SERPL CALC-MCNC: 159 MG/DL (ref 0–100)
LYMPHOCYTES # BLD: 2.1 K/UL (ref 0.5–4.6)
LYMPHOCYTES NFR BLD: 31 % (ref 13–44)
MCH RBC QN AUTO: 31.1 PG (ref 26.1–32.9)
MCHC RBC AUTO-ENTMCNC: 32.8 G/DL (ref 31.4–35)
MCV RBC AUTO: 94.7 FL (ref 82–102)
MONOCYTES # BLD: 0.6 K/UL (ref 0.1–1.3)
MONOCYTES NFR BLD: 9 % (ref 4–12)
NEUTS SEG # BLD: 3.9 K/UL (ref 1.7–8.2)
NEUTS SEG NFR BLD: 56 % (ref 43–78)
NRBC # BLD: 0 K/UL (ref 0–0.2)
PLATELET # BLD AUTO: 289 K/UL (ref 150–450)
PMV BLD AUTO: 11.2 FL (ref 9.4–12.3)
POTASSIUM SERPL-SCNC: 4.6 MMOL/L (ref 3.5–5.1)
PROT SERPL-MCNC: 6.8 G/DL (ref 6.3–8.2)
RBC # BLD AUTO: 4.73 M/UL (ref 4.05–5.2)
SODIUM SERPL-SCNC: 139 MMOL/L (ref 136–145)
TRIGL SERPL-MCNC: 124 MG/DL (ref 0–150)
TSH W FREE THYROID IF ABNORMAL: 2.09 UIU/ML (ref 0.27–4.2)
VLDLC SERPL CALC-MCNC: 25 MG/DL (ref 6–23)
WBC # BLD AUTO: 6.8 K/UL (ref 4.3–11.1)

## 2024-09-03 ENCOUNTER — OFFICE VISIT (OUTPATIENT)
Dept: INTERNAL MEDICINE CLINIC | Facility: CLINIC | Age: 89
End: 2024-09-03
Payer: MEDICARE

## 2024-09-03 VITALS
HEART RATE: 69 BPM | WEIGHT: 109 LBS | HEIGHT: 61 IN | BODY MASS INDEX: 20.58 KG/M2 | OXYGEN SATURATION: 98 % | SYSTOLIC BLOOD PRESSURE: 155 MMHG | DIASTOLIC BLOOD PRESSURE: 80 MMHG

## 2024-09-03 DIAGNOSIS — E78.01 FAMILIAL HYPERCHOLESTEREMIA: Primary | ICD-10-CM

## 2024-09-03 DIAGNOSIS — I10 PRIMARY HYPERTENSION: ICD-10-CM

## 2024-09-03 PROCEDURE — 1090F PRES/ABSN URINE INCON ASSESS: CPT | Performed by: STUDENT IN AN ORGANIZED HEALTH CARE EDUCATION/TRAINING PROGRAM

## 2024-09-03 PROCEDURE — 99214 OFFICE O/P EST MOD 30 MIN: CPT | Performed by: STUDENT IN AN ORGANIZED HEALTH CARE EDUCATION/TRAINING PROGRAM

## 2024-09-03 PROCEDURE — G8420 CALC BMI NORM PARAMETERS: HCPCS | Performed by: STUDENT IN AN ORGANIZED HEALTH CARE EDUCATION/TRAINING PROGRAM

## 2024-09-03 PROCEDURE — 1036F TOBACCO NON-USER: CPT | Performed by: STUDENT IN AN ORGANIZED HEALTH CARE EDUCATION/TRAINING PROGRAM

## 2024-09-03 PROCEDURE — G8427 DOCREV CUR MEDS BY ELIG CLIN: HCPCS | Performed by: STUDENT IN AN ORGANIZED HEALTH CARE EDUCATION/TRAINING PROGRAM

## 2024-09-03 PROCEDURE — 1123F ACP DISCUSS/DSCN MKR DOCD: CPT | Performed by: STUDENT IN AN ORGANIZED HEALTH CARE EDUCATION/TRAINING PROGRAM

## 2024-09-03 RX ORDER — PRAVASTATIN SODIUM 20 MG
20 TABLET ORAL DAILY
Qty: 90 TABLET | Refills: 3 | Status: SHIPPED | OUTPATIENT
Start: 2024-09-03

## 2024-09-03 ASSESSMENT — PATIENT HEALTH QUESTIONNAIRE - PHQ9
SUM OF ALL RESPONSES TO PHQ9 QUESTIONS 1 & 2: 0
1. LITTLE INTEREST OR PLEASURE IN DOING THINGS: NOT AT ALL
2. FEELING DOWN, DEPRESSED OR HOPELESS: NOT AT ALL
SUM OF ALL RESPONSES TO PHQ QUESTIONS 1-9: 0

## 2024-09-03 NOTE — PROGRESS NOTES
Substance and Sexual Activity   Alcohol Use Not Currently      Social History     Substance and Sexual Activity   Drug Use No      Allergies as of 09/03/2024 - Fully Reviewed 09/03/2024   Allergen Reaction Noted    Atorvastatin Other (See Comments) 09/11/2014    Ezetimibe-simvastatin Other (See Comments) 09/11/2014    Penicillins  10/27/2023    Sulfa antibiotics Rash 07/26/2008       Review of Systems    Objective:    Vitals:    09/03/24 1351   BP: (!) 155/80   Site: Left Upper Arm   Position: Sitting   Pulse: 69   SpO2: 98%   Weight: 49.4 kg (109 lb)   Height: 1.549 m (5' 1\")        Physical Exam  Constitutional:       General: She is not in acute distress.     Appearance: Normal appearance.   HENT:      Head: Normocephalic and atraumatic.        Comments: Macule above, hyperpigmented  Eyes:      Extraocular Movements: Extraocular movements intact.      Conjunctiva/sclera: Conjunctivae normal.   Cardiovascular:      Rate and Rhythm: Normal rate and regular rhythm.      Heart sounds: No murmur heard.  Pulmonary:      Effort: Pulmonary effort is normal.      Breath sounds: Normal breath sounds. No wheezing, rhonchi or rales.   Skin:     General: Skin is warm and dry.   Neurological:      Mental Status: She is alert. Mental status is at baseline.   Psychiatric:         Mood and Affect: Mood normal.         Behavior: Behavior normal.         Assessent & Plan    1. Familial hypercholesteremia  -     pravastatin (PRAVACHOL) 20 MG tablet; Take 1 tablet by mouth daily, Disp-90 tablet, R-3Normal  2. Primary hypertension     Restart pravastatin    Requested that with assistance of friend she check BP for a week and send us log    The patient and/or patient representative voiced understanding and agreement with the current diagnoses, recommendations, and possible side effects.    Return in about 4 months (around 1/3/2025) for routine follow up, BP check.     Love Perera MD

## 2024-09-05 ENCOUNTER — TELEPHONE (OUTPATIENT)
Dept: UROLOGY | Age: 89
End: 2024-09-05

## 2024-09-05 NOTE — TELEPHONE ENCOUNTER
Judy, Pt's friend stated on VMLOM that she received a call from [facility] but no VM was left indicating the reason for the call and was wondering if it had to do with an appointment confirmation for 9/10/24. Called and spoke with Judy to inform her that the call was just an appt confirmation for Pt to be seen by Dr Walter.    Appt, date, time and location confirmed.

## 2024-09-10 ENCOUNTER — OFFICE VISIT (OUTPATIENT)
Dept: UROLOGY | Age: 89
End: 2024-09-10
Payer: MEDICARE

## 2024-09-10 DIAGNOSIS — R32 URINARY INCONTINENCE, UNSPECIFIED TYPE: ICD-10-CM

## 2024-09-10 DIAGNOSIS — N39.0 RECURRENT UTI: Primary | ICD-10-CM

## 2024-09-10 LAB
BILIRUBIN, URINE, POC: NEGATIVE
BLOOD URINE, POC: NORMAL
GLUCOSE URINE, POC: NEGATIVE
KETONES, URINE, POC: NEGATIVE
LEUKOCYTE ESTERASE, URINE, POC: NORMAL
NITRITE, URINE, POC: NEGATIVE
PH, URINE, POC: 5.5 (ref 4.6–8)
PROTEIN,URINE, POC: NEGATIVE
SPECIFIC GRAVITY, URINE, POC: 1.01 (ref 1–1.03)
URINALYSIS CLARITY, POC: NORMAL
URINALYSIS COLOR, POC: NORMAL
UROBILINOGEN, POC: NORMAL

## 2024-09-10 PROCEDURE — 1090F PRES/ABSN URINE INCON ASSESS: CPT | Performed by: UROLOGY

## 2024-09-10 PROCEDURE — 1123F ACP DISCUSS/DSCN MKR DOCD: CPT | Performed by: UROLOGY

## 2024-09-10 PROCEDURE — 99213 OFFICE O/P EST LOW 20 MIN: CPT | Performed by: UROLOGY

## 2024-09-10 PROCEDURE — 0509F URINE INCON PLAN DOCD: CPT | Performed by: UROLOGY

## 2024-09-10 PROCEDURE — 1036F TOBACCO NON-USER: CPT | Performed by: UROLOGY

## 2024-09-10 PROCEDURE — G8420 CALC BMI NORM PARAMETERS: HCPCS | Performed by: UROLOGY

## 2024-09-10 PROCEDURE — G8427 DOCREV CUR MEDS BY ELIG CLIN: HCPCS | Performed by: UROLOGY

## 2024-09-10 PROCEDURE — 81003 URINALYSIS AUTO W/O SCOPE: CPT | Performed by: UROLOGY

## 2024-09-10 RX ORDER — VIBEGRON 75 MG/1
75 TABLET, FILM COATED ORAL DAILY
Qty: 30 TABLET | Refills: 5 | Status: SHIPPED | OUTPATIENT
Start: 2024-09-10

## 2024-09-10 ASSESSMENT — ENCOUNTER SYMPTOMS
NAUSEA: 0
BACK PAIN: 0

## 2024-09-19 ENCOUNTER — HOSPITAL ENCOUNTER (EMERGENCY)
Age: 89
Discharge: HOME | End: 2024-09-20
Attending: STUDENT IN AN ORGANIZED HEALTH CARE EDUCATION/TRAINING PROGRAM
Payer: MEDICARE

## 2024-09-19 ENCOUNTER — APPOINTMENT (OUTPATIENT)
Dept: CT IMAGING | Age: 89
End: 2024-09-19
Payer: MEDICARE

## 2024-09-19 DIAGNOSIS — R53.1 GENERAL WEAKNESS: Primary | ICD-10-CM

## 2024-09-19 DIAGNOSIS — N30.00 ACUTE CYSTITIS WITHOUT HEMATURIA: ICD-10-CM

## 2024-09-19 LAB
ALBUMIN SERPL-MCNC: 3.8 G/DL (ref 3.2–4.6)
ALBUMIN/GLOB SERPL: 1.6 (ref 1–1.9)
ALP SERPL-CCNC: 53 U/L (ref 35–104)
ALT SERPL-CCNC: 12 U/L (ref 12–65)
ANION GAP SERPL CALC-SCNC: 9 MMOL/L (ref 9–18)
APPEARANCE UR: CLEAR
AST SERPL-CCNC: 15 U/L (ref 15–37)
BACTERIA URNS QL MICRO: ABNORMAL /HPF
BASOPHILS # BLD: 0 K/UL (ref 0–0.2)
BASOPHILS NFR BLD: 0 % (ref 0–2)
BILIRUB SERPL-MCNC: 0.3 MG/DL (ref 0–1.2)
BILIRUB UR QL: NEGATIVE
BUN SERPL-MCNC: 17 MG/DL (ref 8–23)
CALCIUM SERPL-MCNC: 9.7 MG/DL (ref 8.8–10.2)
CASTS URNS QL MICRO: ABNORMAL /LPF
CHLORIDE SERPL-SCNC: 97 MMOL/L (ref 98–107)
CO2 SERPL-SCNC: 28 MMOL/L (ref 20–28)
COLOR UR: ABNORMAL
CREAT SERPL-MCNC: 0.64 MG/DL (ref 0.6–1.1)
CRYSTALS URNS QL MICRO: 0 /LPF
DIFFERENTIAL METHOD BLD: NORMAL
EOSINOPHIL # BLD: 0.1 K/UL (ref 0–0.8)
EOSINOPHIL NFR BLD: 1 % (ref 0.5–7.8)
EPI CELLS #/AREA URNS HPF: ABNORMAL /HPF
ERYTHROCYTE [DISTWIDTH] IN BLOOD BY AUTOMATED COUNT: 13 % (ref 11.9–14.6)
GLOBULIN SER CALC-MCNC: 2.4 G/DL (ref 2.3–3.5)
GLUCOSE SERPL-MCNC: 125 MG/DL (ref 70–99)
GLUCOSE UR STRIP.AUTO-MCNC: NEGATIVE MG/DL
HCT VFR BLD AUTO: 38.7 % (ref 35.8–46.3)
HGB BLD-MCNC: 13 G/DL (ref 11.7–15.4)
HGB UR QL STRIP: ABNORMAL
IMM GRANULOCYTES # BLD AUTO: 0 K/UL (ref 0–0.5)
IMM GRANULOCYTES NFR BLD AUTO: 0 % (ref 0–5)
KETONES UR QL STRIP.AUTO: NEGATIVE MG/DL
LEUKOCYTE ESTERASE UR QL STRIP.AUTO: ABNORMAL
LYMPHOCYTES # BLD: 1.6 K/UL (ref 0.5–4.6)
LYMPHOCYTES NFR BLD: 23 % (ref 13–44)
MCH RBC QN AUTO: 31 PG (ref 26.1–32.9)
MCHC RBC AUTO-ENTMCNC: 33.6 G/DL (ref 31.4–35)
MCV RBC AUTO: 92.4 FL (ref 82–102)
MONOCYTES # BLD: 0.7 K/UL (ref 0.1–1.3)
MONOCYTES NFR BLD: 10 % (ref 4–12)
MUCOUS THREADS URNS QL MICRO: 0 /LPF
NEUTS SEG # BLD: 4.5 K/UL (ref 1.7–8.2)
NEUTS SEG NFR BLD: 65 % (ref 43–78)
NITRITE UR QL STRIP.AUTO: NEGATIVE
NRBC # BLD: 0 K/UL (ref 0–0.2)
PH UR STRIP: 5.5 (ref 5–9)
PLATELET # BLD AUTO: 205 K/UL (ref 150–450)
PMV BLD AUTO: 10.5 FL (ref 9.4–12.3)
POTASSIUM SERPL-SCNC: 4.4 MMOL/L (ref 3.5–5.1)
PROT SERPL-MCNC: 6.2 G/DL (ref 6.3–8.2)
PROT UR STRIP-MCNC: NEGATIVE MG/DL
RBC # BLD AUTO: 4.19 M/UL (ref 4.05–5.2)
RBC #/AREA URNS HPF: ABNORMAL /HPF
SODIUM SERPL-SCNC: 134 MMOL/L (ref 136–145)
SP GR UR REFRACTOMETRY: 1.01 (ref 1–1.02)
URINE CULTURE IF INDICATED: ABNORMAL
UROBILINOGEN UR QL STRIP.AUTO: 0.2 EU/DL (ref 0.2–1)
WBC # BLD AUTO: 7 K/UL (ref 4.3–11.1)
WBC URNS QL MICRO: ABNORMAL /HPF

## 2024-09-19 PROCEDURE — 80053 COMPREHEN METABOLIC PANEL: CPT

## 2024-09-19 PROCEDURE — 87086 URINE CULTURE/COLONY COUNT: CPT

## 2024-09-19 PROCEDURE — 6370000000 HC RX 637 (ALT 250 FOR IP): Performed by: STUDENT IN AN ORGANIZED HEALTH CARE EDUCATION/TRAINING PROGRAM

## 2024-09-19 PROCEDURE — 99284 EMERGENCY DEPT VISIT MOD MDM: CPT

## 2024-09-19 PROCEDURE — 81001 URINALYSIS AUTO W/SCOPE: CPT

## 2024-09-19 PROCEDURE — 85025 COMPLETE CBC W/AUTO DIFF WBC: CPT

## 2024-09-19 RX ORDER — CEPHALEXIN 500 MG/1
500 CAPSULE ORAL
Status: COMPLETED | OUTPATIENT
Start: 2024-09-19 | End: 2024-09-19

## 2024-09-19 RX ADMIN — CEPHALEXIN 500 MG: 500 CAPSULE ORAL at 23:37

## 2024-09-19 ASSESSMENT — LIFESTYLE VARIABLES: HOW OFTEN DO YOU HAVE A DRINK CONTAINING ALCOHOL: NEVER

## 2024-09-19 ASSESSMENT — PAIN - FUNCTIONAL ASSESSMENT: PAIN_FUNCTIONAL_ASSESSMENT: NONE - DENIES PAIN

## 2024-09-20 ENCOUNTER — APPOINTMENT (OUTPATIENT)
Dept: CT IMAGING | Age: 89
End: 2024-09-20
Payer: MEDICARE

## 2024-09-20 VITALS
HEIGHT: 61 IN | BODY MASS INDEX: 18.5 KG/M2 | OXYGEN SATURATION: 98 % | SYSTOLIC BLOOD PRESSURE: 146 MMHG | RESPIRATION RATE: 16 BRPM | DIASTOLIC BLOOD PRESSURE: 45 MMHG | HEART RATE: 80 BPM | TEMPERATURE: 98.2 F | WEIGHT: 98 LBS

## 2024-09-20 PROCEDURE — 70450 CT HEAD/BRAIN W/O DYE: CPT

## 2024-09-20 RX ORDER — CEPHALEXIN 500 MG/1
500 CAPSULE ORAL 2 TIMES DAILY
Qty: 14 CAPSULE | Refills: 0 | Status: SHIPPED | OUTPATIENT
Start: 2024-09-20 | End: 2024-09-27

## 2024-09-20 ASSESSMENT — PAIN - FUNCTIONAL ASSESSMENT: PAIN_FUNCTIONAL_ASSESSMENT: NONE - DENIES PAIN

## 2024-09-22 LAB
BACTERIA SPEC CULT: NORMAL
SERVICE CMNT-IMP: NORMAL

## 2024-10-18 ENCOUNTER — OFFICE VISIT (OUTPATIENT)
Dept: UROLOGY | Age: 89
End: 2024-10-18
Payer: MEDICARE

## 2024-10-18 DIAGNOSIS — R31.29 MICROHEMATURIA: ICD-10-CM

## 2024-10-18 DIAGNOSIS — N39.41 URGE INCONTINENCE: ICD-10-CM

## 2024-10-18 DIAGNOSIS — N39.0 RECURRENT UTI: Primary | ICD-10-CM

## 2024-10-18 LAB
BILIRUBIN, URINE, POC: NEGATIVE
BLOOD URINE, POC: NORMAL
GLUCOSE URINE, POC: NEGATIVE MG/DL
KETONES, URINE, POC: NEGATIVE MG/DL
LEUKOCYTE ESTERASE, URINE, POC: NEGATIVE
NITRITE, URINE, POC: NEGATIVE
PH, URINE, POC: 6.5 (ref 4.6–8)
PROTEIN,URINE, POC: NEGATIVE MG/DL
SPECIFIC GRAVITY, URINE, POC: 1.02 (ref 1–1.03)
URINALYSIS CLARITY, POC: NORMAL
URINALYSIS COLOR, POC: NORMAL
UROBILINOGEN, POC: NORMAL MG/DL

## 2024-10-18 PROCEDURE — G8420 CALC BMI NORM PARAMETERS: HCPCS | Performed by: NURSE PRACTITIONER

## 2024-10-18 PROCEDURE — 0509F URINE INCON PLAN DOCD: CPT | Performed by: NURSE PRACTITIONER

## 2024-10-18 PROCEDURE — 1123F ACP DISCUSS/DSCN MKR DOCD: CPT | Performed by: NURSE PRACTITIONER

## 2024-10-18 PROCEDURE — 99214 OFFICE O/P EST MOD 30 MIN: CPT | Performed by: NURSE PRACTITIONER

## 2024-10-18 PROCEDURE — 81003 URINALYSIS AUTO W/O SCOPE: CPT | Performed by: NURSE PRACTITIONER

## 2024-10-18 PROCEDURE — G8484 FLU IMMUNIZE NO ADMIN: HCPCS | Performed by: NURSE PRACTITIONER

## 2024-10-18 PROCEDURE — 1036F TOBACCO NON-USER: CPT | Performed by: NURSE PRACTITIONER

## 2024-10-18 PROCEDURE — 1090F PRES/ABSN URINE INCON ASSESS: CPT | Performed by: NURSE PRACTITIONER

## 2024-10-18 PROCEDURE — G8427 DOCREV CUR MEDS BY ELIG CLIN: HCPCS | Performed by: NURSE PRACTITIONER

## 2024-10-18 ASSESSMENT — ENCOUNTER SYMPTOMS: BACK PAIN: 0

## 2024-10-28 ENCOUNTER — OFFICE VISIT (OUTPATIENT)
Dept: NEUROLOGY | Age: 89
End: 2024-10-28
Payer: MEDICARE

## 2024-10-28 ENCOUNTER — TELEPHONE (OUTPATIENT)
Dept: UROLOGY | Age: 89
End: 2024-10-28

## 2024-10-28 VITALS
SYSTOLIC BLOOD PRESSURE: 155 MMHG | DIASTOLIC BLOOD PRESSURE: 59 MMHG | HEART RATE: 70 BPM | HEIGHT: 61 IN | WEIGHT: 109.6 LBS | BODY MASS INDEX: 20.69 KG/M2

## 2024-10-28 DIAGNOSIS — E78.5 HYPERLIPIDEMIA, UNSPECIFIED HYPERLIPIDEMIA TYPE: ICD-10-CM

## 2024-10-28 DIAGNOSIS — E11.9 TYPE 2 DIABETES MELLITUS WITHOUT COMPLICATION, WITHOUT LONG-TERM CURRENT USE OF INSULIN (HCC): ICD-10-CM

## 2024-10-28 DIAGNOSIS — I63.81 CEREBROVASCULAR ACCIDENT (CVA) DUE TO OCCLUSION OF SMALL ARTERY (HCC): Primary | ICD-10-CM

## 2024-10-28 DIAGNOSIS — I10 PRIMARY HYPERTENSION: ICD-10-CM

## 2024-10-28 PROCEDURE — 1123F ACP DISCUSS/DSCN MKR DOCD: CPT | Performed by: PHYSICAL THERAPIST

## 2024-10-28 PROCEDURE — G8484 FLU IMMUNIZE NO ADMIN: HCPCS | Performed by: PHYSICAL THERAPIST

## 2024-10-28 PROCEDURE — 1126F AMNT PAIN NOTED NONE PRSNT: CPT | Performed by: PHYSICAL THERAPIST

## 2024-10-28 PROCEDURE — 3044F HG A1C LEVEL LT 7.0%: CPT | Performed by: PHYSICAL THERAPIST

## 2024-10-28 PROCEDURE — 1036F TOBACCO NON-USER: CPT | Performed by: PHYSICAL THERAPIST

## 2024-10-28 PROCEDURE — 1090F PRES/ABSN URINE INCON ASSESS: CPT | Performed by: PHYSICAL THERAPIST

## 2024-10-28 PROCEDURE — G8420 CALC BMI NORM PARAMETERS: HCPCS | Performed by: PHYSICAL THERAPIST

## 2024-10-28 PROCEDURE — 99214 OFFICE O/P EST MOD 30 MIN: CPT | Performed by: PHYSICAL THERAPIST

## 2024-10-28 PROCEDURE — 1159F MED LIST DOCD IN RCRD: CPT | Performed by: PHYSICAL THERAPIST

## 2024-10-28 PROCEDURE — G8427 DOCREV CUR MEDS BY ELIG CLIN: HCPCS | Performed by: PHYSICAL THERAPIST

## 2024-10-28 RX ORDER — VIBEGRON 75 MG/1
75 TABLET, FILM COATED ORAL DAILY
Qty: 30 TABLET | Refills: 5 | Status: SHIPPED | OUTPATIENT
Start: 2024-10-28

## 2024-10-28 ASSESSMENT — PATIENT HEALTH QUESTIONNAIRE - PHQ9
SUM OF ALL RESPONSES TO PHQ QUESTIONS 1-9: 0
SUM OF ALL RESPONSES TO PHQ9 QUESTIONS 1 & 2: 0
SUM OF ALL RESPONSES TO PHQ QUESTIONS 1-9: 0
SUM OF ALL RESPONSES TO PHQ QUESTIONS 1-9: 0
2. FEELING DOWN, DEPRESSED OR HOPELESS: NOT AT ALL
1. LITTLE INTEREST OR PLEASURE IN DOING THINGS: NOT AT ALL
SUM OF ALL RESPONSES TO PHQ QUESTIONS 1-9: 0

## 2024-10-28 ASSESSMENT — ENCOUNTER SYMPTOMS
TROUBLE SWALLOWING: 0
RESPIRATORY NEGATIVE: 1
EYE PAIN: 0

## 2024-10-28 NOTE — TELEPHONE ENCOUNTER
LVMOM stating that I have informed Dr Walter of the request and he will advise once able. Left direct number on VM if needed.

## 2024-10-28 NOTE — PROGRESS NOTES
murmurs, rubs, or gallops.  Lungs - Clear to auscultation.  Speaking complete sentences, on room air.     Neurological examination -speech and language are normal.  Visual acuity severely diminished bilaterally.  Extraocular motility is intact.  Pupils are round and reactive, but a bit sluggish to light.  Left  strength is diminished, but otherwise it seems her left upper extremity strength has returned to 5/5.  All other muscle testing reveals 5/5 as well.  Gait is slow, with decreased stride length bilaterally.      Diagnoses and all orders for this visit:    Cerebrovascular accident (CVA) due to occlusion of small artery (HCC)  -Stable and without progression.  Continue secondary prevention  -Aspirin daily  -Lipid continues to be difficult to control.  Has increased from 1 32-1 59.  This is despite using Repatha monthly.  PCP started statin.  I recommended continued follow-up PCP for blood pressure control and cholesterol control.  Goal LDL is less than 70.  -Blood pressure goal less than 130/80    Hyperlipidemia, unspecified hyperlipidemia type  -Poorly controlled as noted above  -Continue Repatha and statin.  Continue follow-up PCP.  Goal is less than 70 LDL.    Primary hypertension  -Mildly elevated in clinic today, likely whitecoat hypertension.  -Continue lisinopril 20 mg daily    Type 2 diabetes mellitus without complication, without long-term current use of insulin ([facility])  -A1c is at goal, 6.3.  Goal is less than 7  -Continue metformin and dietary changes        Follow-up and Dispositions    Return in about 1 year (around 10/28/2025).       Cumulative time spent was 35 minutes which included chart review, documentation, and counseling the patient and/or family on medical condition.        Michael Sadlana JR, PA  [facility] [address], [address]  [place] [address]  Phone:[phone]

## 2024-10-28 NOTE — TELEPHONE ENCOUNTER
The patient called in to get her prescription for GEMTESA sent to the [facility] pharmacy  at [address] [place] [address] Syed Culp.

## 2024-12-25 ENCOUNTER — PATIENT MESSAGE (OUTPATIENT)
Dept: INTERNAL MEDICINE CLINIC | Facility: CLINIC | Age: 88
End: 2024-12-25

## 2024-12-25 DIAGNOSIS — R26.81 GAIT INSTABILITY: ICD-10-CM

## 2024-12-25 DIAGNOSIS — R26.9 GAIT ABNORMALITY: Primary | ICD-10-CM

## 2024-12-25 DIAGNOSIS — R29.898 WEAKNESS OF LOWER EXTREMITY, UNSPECIFIED LATERALITY: ICD-10-CM

## 2024-12-26 NOTE — TELEPHONE ENCOUNTER
Please advise. Patient's son is requesting more home PT for mobility issues. Referral pended, if ok.

## 2025-01-21 ENCOUNTER — TELEPHONE (OUTPATIENT)
Dept: INTERNAL MEDICINE CLINIC | Facility: CLINIC | Age: 89
End: 2025-01-21

## 2025-01-30 DIAGNOSIS — E78.3 MIXED HYPERGLYCERIDEMIA: Primary | ICD-10-CM

## 2025-01-30 DIAGNOSIS — M85.859 OSTEOPENIA OF HIP, UNSPECIFIED LATERALITY: ICD-10-CM

## 2025-01-30 DIAGNOSIS — M81.0 AGE-RELATED OSTEOPOROSIS WITHOUT CURRENT PATHOLOGICAL FRACTURE: Primary | ICD-10-CM

## 2025-01-30 RX ORDER — SODIUM CHLORIDE 9 MG/ML
5-250 INJECTION, SOLUTION INTRAVENOUS PRN
OUTPATIENT
Start: 2025-01-30

## 2025-01-30 RX ORDER — FAMOTIDINE 10 MG/ML
20 INJECTION, SOLUTION INTRAVENOUS
OUTPATIENT
Start: 2025-01-30

## 2025-01-30 RX ORDER — DIPHENHYDRAMINE HYDROCHLORIDE 50 MG/ML
50 INJECTION INTRAMUSCULAR; INTRAVENOUS
OUTPATIENT
Start: 2025-01-30

## 2025-01-30 RX ORDER — ZOLEDRONIC ACID 0.05 MG/ML
5 INJECTION, SOLUTION INTRAVENOUS ONCE
OUTPATIENT
Start: 2025-01-30 | End: 2025-01-30

## 2025-01-30 RX ORDER — ACETAMINOPHEN 325 MG/1
650 TABLET ORAL
OUTPATIENT
Start: 2025-01-30

## 2025-01-30 RX ORDER — SODIUM CHLORIDE 9 MG/ML
INJECTION, SOLUTION INTRAVENOUS CONTINUOUS
OUTPATIENT
Start: 2025-01-30

## 2025-01-30 RX ORDER — SODIUM CHLORIDE 0.9 % (FLUSH) 0.9 %
5-40 SYRINGE (ML) INJECTION PRN
OUTPATIENT
Start: 2025-01-30

## 2025-01-30 RX ORDER — EPINEPHRINE 1 MG/ML
0.3 INJECTION, SOLUTION, CONCENTRATE INTRAVENOUS PRN
OUTPATIENT
Start: 2025-01-30

## 2025-01-30 RX ORDER — ONDANSETRON 2 MG/ML
8 INJECTION INTRAMUSCULAR; INTRAVENOUS
OUTPATIENT
Start: 2025-01-30

## 2025-01-30 RX ORDER — HYDROCORTISONE SODIUM SUCCINATE 100 MG/2ML
100 INJECTION INTRAMUSCULAR; INTRAVENOUS
OUTPATIENT
Start: 2025-01-30

## 2025-01-30 RX ORDER — HEPARIN SODIUM (PORCINE) LOCK FLUSH IV SOLN 100 UNIT/ML 100 UNIT/ML
500 SOLUTION INTRAVENOUS PRN
OUTPATIENT
Start: 2025-01-30

## 2025-01-30 RX ORDER — ALBUTEROL SULFATE 90 UG/1
4 INHALANT RESPIRATORY (INHALATION) PRN
OUTPATIENT
Start: 2025-01-30

## 2025-02-05 ENCOUNTER — NURSE ONLY (OUTPATIENT)
Age: 89
End: 2025-02-05

## 2025-02-05 VITALS
RESPIRATION RATE: 16 BRPM | TEMPERATURE: 98.1 F | HEART RATE: 68 BPM | SYSTOLIC BLOOD PRESSURE: 135 MMHG | DIASTOLIC BLOOD PRESSURE: 69 MMHG

## 2025-02-05 DIAGNOSIS — M81.0 AGE-RELATED OSTEOPOROSIS WITHOUT CURRENT PATHOLOGICAL FRACTURE: Primary | ICD-10-CM

## 2025-02-05 DIAGNOSIS — M85.859 OSTEOPENIA OF HIP, UNSPECIFIED LATERALITY: ICD-10-CM

## 2025-02-05 LAB
ALBUMIN SERPL-MCNC: 3.9 G/DL (ref 3.2–4.6)
ALBUMIN/GLOB SERPL: 1.3 (ref 1–1.9)
ALP SERPL-CCNC: 67 U/L (ref 35–104)
ALT SERPL-CCNC: 15 U/L (ref 8–45)
ANION GAP SERPL CALC-SCNC: 10 MMOL/L (ref 7–16)
AST SERPL-CCNC: 20 U/L (ref 15–37)
BILIRUB SERPL-MCNC: 0.2 MG/DL (ref 0–1.2)
BUN SERPL-MCNC: 22 MG/DL (ref 8–23)
CALCIUM SERPL-MCNC: 10.2 MG/DL (ref 8.8–10.2)
CHLORIDE SERPL-SCNC: 100 MMOL/L (ref 98–107)
CO2 SERPL-SCNC: 29 MMOL/L (ref 20–29)
CREAT SERPL-MCNC: 0.75 MG/DL (ref 0.6–1.1)
GLOBULIN SER CALC-MCNC: 3.1 G/DL (ref 2.3–3.5)
GLUCOSE SERPL-MCNC: 122 MG/DL (ref 70–99)
PHOSPHATE SERPL-MCNC: 3 MG/DL (ref 2.5–4.5)
POTASSIUM SERPL-SCNC: 4.1 MMOL/L (ref 3.5–5.1)
PROT SERPL-MCNC: 6.9 G/DL (ref 6.3–8.2)
SODIUM SERPL-SCNC: 139 MMOL/L (ref 136–145)

## 2025-02-05 RX ORDER — DIPHENHYDRAMINE HYDROCHLORIDE 50 MG/ML
50 INJECTION INTRAMUSCULAR; INTRAVENOUS
Status: CANCELLED | OUTPATIENT
Start: 2026-02-01

## 2025-02-05 RX ORDER — SODIUM CHLORIDE 0.9 % (FLUSH) 0.9 %
5-40 SYRINGE (ML) INJECTION PRN
Status: DISCONTINUED | OUTPATIENT
Start: 2025-02-05 | End: 2025-02-05 | Stop reason: HOSPADM

## 2025-02-05 RX ORDER — HYDROCORTISONE SODIUM SUCCINATE 100 MG/2ML
100 INJECTION INTRAMUSCULAR; INTRAVENOUS
Status: DISCONTINUED | OUTPATIENT
Start: 2025-02-05 | End: 2025-02-05 | Stop reason: HOSPADM

## 2025-02-05 RX ORDER — ZOLEDRONIC ACID 0.05 MG/ML
5 INJECTION, SOLUTION INTRAVENOUS ONCE
Status: CANCELLED | OUTPATIENT
Start: 2026-02-01 | End: 2026-02-01

## 2025-02-05 RX ORDER — SODIUM CHLORIDE 9 MG/ML
5-250 INJECTION, SOLUTION INTRAVENOUS PRN
Status: DISCONTINUED | OUTPATIENT
Start: 2025-02-05 | End: 2025-02-05 | Stop reason: HOSPADM

## 2025-02-05 RX ORDER — ONDANSETRON 2 MG/ML
8 INJECTION INTRAMUSCULAR; INTRAVENOUS
Status: CANCELLED | OUTPATIENT
Start: 2026-02-01

## 2025-02-05 RX ORDER — ACETAMINOPHEN 325 MG/1
650 TABLET ORAL
Status: CANCELLED | OUTPATIENT
Start: 2026-02-01

## 2025-02-05 RX ORDER — ZOLEDRONIC ACID 0.05 MG/ML
5 INJECTION, SOLUTION INTRAVENOUS ONCE
Status: COMPLETED | OUTPATIENT
Start: 2025-02-05 | End: 2025-02-05

## 2025-02-05 RX ORDER — DIPHENHYDRAMINE HYDROCHLORIDE 50 MG/ML
50 INJECTION INTRAMUSCULAR; INTRAVENOUS
Status: DISCONTINUED | OUTPATIENT
Start: 2025-02-05 | End: 2025-02-05 | Stop reason: HOSPADM

## 2025-02-05 RX ORDER — EPINEPHRINE 1 MG/ML
0.3 INJECTION, SOLUTION, CONCENTRATE INTRAVENOUS PRN
Status: CANCELLED | OUTPATIENT
Start: 2026-02-01

## 2025-02-05 RX ORDER — HEPARIN 100 UNIT/ML
500 SYRINGE INTRAVENOUS PRN
Status: CANCELLED | OUTPATIENT
Start: 2026-02-01

## 2025-02-05 RX ORDER — ALBUTEROL SULFATE 90 UG/1
4 INHALANT RESPIRATORY (INHALATION) PRN
Status: DISCONTINUED | OUTPATIENT
Start: 2025-02-05 | End: 2025-02-05 | Stop reason: HOSPADM

## 2025-02-05 RX ORDER — ALBUTEROL SULFATE 90 UG/1
4 INHALANT RESPIRATORY (INHALATION) PRN
Status: CANCELLED | OUTPATIENT
Start: 2026-02-01

## 2025-02-05 RX ORDER — SODIUM CHLORIDE 0.9 % (FLUSH) 0.9 %
5-40 SYRINGE (ML) INJECTION PRN
Status: CANCELLED | OUTPATIENT
Start: 2026-02-01

## 2025-02-05 RX ORDER — SODIUM CHLORIDE 9 MG/ML
INJECTION, SOLUTION INTRAVENOUS CONTINUOUS
Status: DISCONTINUED | OUTPATIENT
Start: 2025-02-05 | End: 2025-02-05 | Stop reason: HOSPADM

## 2025-02-05 RX ORDER — SODIUM CHLORIDE 9 MG/ML
5-250 INJECTION, SOLUTION INTRAVENOUS PRN
Status: CANCELLED | OUTPATIENT
Start: 2026-02-01

## 2025-02-05 RX ORDER — EPINEPHRINE 1 MG/ML
0.3 INJECTION, SOLUTION, CONCENTRATE INTRAVENOUS PRN
Status: DISCONTINUED | OUTPATIENT
Start: 2025-02-05 | End: 2025-02-05 | Stop reason: HOSPADM

## 2025-02-05 RX ORDER — ACETAMINOPHEN 325 MG/1
650 TABLET ORAL
Status: DISCONTINUED | OUTPATIENT
Start: 2025-02-05 | End: 2025-02-05 | Stop reason: HOSPADM

## 2025-02-05 RX ORDER — SODIUM CHLORIDE 9 MG/ML
INJECTION, SOLUTION INTRAVENOUS CONTINUOUS
Status: CANCELLED | OUTPATIENT
Start: 2026-02-01

## 2025-02-05 RX ORDER — ONDANSETRON 2 MG/ML
8 INJECTION INTRAMUSCULAR; INTRAVENOUS
Status: DISCONTINUED | OUTPATIENT
Start: 2025-02-05 | End: 2025-02-05 | Stop reason: HOSPADM

## 2025-02-05 RX ORDER — HEPARIN 100 UNIT/ML
500 SYRINGE INTRAVENOUS PRN
Status: DISCONTINUED | OUTPATIENT
Start: 2025-02-05 | End: 2025-02-05 | Stop reason: HOSPADM

## 2025-02-05 RX ORDER — HYDROCORTISONE SODIUM SUCCINATE 100 MG/2ML
100 INJECTION INTRAMUSCULAR; INTRAVENOUS
Status: CANCELLED | OUTPATIENT
Start: 2026-02-01

## 2025-02-05 RX ADMIN — ZOLEDRONIC ACID 5 MG: 0.05 INJECTION, SOLUTION INTRAVENOUS at 11:18

## 2025-02-05 NOTE — PROGRESS NOTES
[facility]  [address], [address]tr[address]  [place] [id number]  Office : [phone], Fax: [phone]     Pre-questions:    Have you had any teeth pulled in the last month or any appointments to have major dental work/teeth pulled in the next two months? No    Have you experienced any fractures in the last year? No    Are you taking vitamin D and Calcium? Yes , vitamin D    When was your last Reclast/osteoporosis infusion? Last year this time      Patient arrived ambulatory to the infusion center for a Reclast infusion. Vital signs WNL. Labs drawn today; Ca 10.2.  All labs are within parameters to receive medication. Patient offered snacks, warm blankets and drinks during visit.     20 g PIV catheter placed in the patient's left  x 1 attempt; flushed with 10ml NS. Patient tolerated well.   Reclast 5 mg/100 mL administered at 400ml/hr. Patient tolerated the infusion well. Total infusion time: 17 minutes.    Post infusion vital signs WNL. PIV flushed with 10ml NS and removed without difficulty, catheter intact; dressing applied. Patient instructed to leave the dressing on for at least 30 minutes before removal. No observation time recommended or required.     Patient discharged ambulatory from the infusion center feeling well.   Appointment time given for next infusion prior to departure.

## 2025-02-24 ENCOUNTER — TELEPHONE (OUTPATIENT)
Dept: INTERNAL MEDICINE CLINIC | Facility: CLINIC | Age: 89
End: 2025-02-24

## 2025-02-24 NOTE — TELEPHONE ENCOUNTER
Requesting Pt for 1 x for 8 weeks.    She also needs clarification on B12 mcg's, please.     Nyla from PT with [facility]    Phone: [phone]

## 2025-02-25 RX ORDER — LANOLIN ALCOHOL/MO/W.PET/CERES
1000 CREAM (GRAM) TOPICAL DAILY
Qty: 90 TABLET | Refills: 3 | Status: SHIPPED | OUTPATIENT
Start: 2025-02-25 | End: 2025-03-27

## 2025-03-08 ENCOUNTER — HOSPITAL ENCOUNTER (INPATIENT)
Age: 89
LOS: 1 days | Discharge: HOME HEALTH | DRG: 556 | End: 2025-03-10
Admitting: FAMILY MEDICINE
Payer: MEDICARE

## 2025-03-08 ENCOUNTER — APPOINTMENT (OUTPATIENT)
Dept: GENERAL RADIOLOGY | Age: 89
DRG: 556 | End: 2025-03-08
Payer: MEDICARE

## 2025-03-08 ENCOUNTER — APPOINTMENT (OUTPATIENT)
Dept: CT IMAGING | Age: 89
DRG: 556 | End: 2025-03-08
Payer: MEDICARE

## 2025-03-08 DIAGNOSIS — N30.00 ACUTE CYSTITIS WITHOUT HEMATURIA: ICD-10-CM
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
## 2025-03-08 PROBLEM — Z66 DNR (DO NOT RESUSCITATE): Status: ACTIVE | Noted: 2025-03-08

## 2025-03-08 PROBLEM — N39.0 ACUTE UTI: Status: ACTIVE | Noted: 2025-03-08

## 2025-03-08 LAB
ALBUMIN SERPL-MCNC: 3.6 G/DL (ref 3.2–4.6)
ALBUMIN/GLOB SERPL: 1.2 (ref 1–1.9)
ALP SERPL-CCNC: 64 U/L (ref 35–104)
ALT SERPL-CCNC: 15 U/L (ref 8–45)
ANION GAP SERPL CALC-SCNC: 13 MMOL/L (ref 7–16)
AST SERPL-CCNC: 23 U/L (ref 15–37)
BACTERIA URNS QL MICRO: ABNORMAL /HPF
BASOPHILS # BLD: 0.03 K/UL (ref 0–0.2)
BASOPHILS NFR BLD: 0.5 % (ref 0–2)
BILIRUB SERPL-MCNC: 0.4 MG/DL (ref 0–1.2)
BILIRUB UR QL: NEGATIVE
BUN SERPL-MCNC: 15 MG/DL (ref 8–23)
CALCIUM SERPL-MCNC: 9.4 MG/DL (ref 8.8–10.2)
CHLORIDE SERPL-SCNC: 101 MMOL/L (ref 98–107)
CK SERPL-CCNC: 47 U/L (ref 21–215)
CO2 SERPL-SCNC: 25 MMOL/L (ref 20–29)
CREAT SERPL-MCNC: 0.68 MG/DL (ref 0.6–1.1)
DIFFERENTIAL METHOD BLD: NORMAL
EKG ATRIAL RATE: 61 BPM
EKG DIAGNOSIS: NORMAL
EKG P AXIS: 77 DEGREES
EKG P-R INTERVAL: 168 MS
EKG Q-T INTERVAL: 406 MS
EKG QRS DURATION: 112 MS
EKG QTC CALCULATION (BAZETT): 408 MS
EKG R AXIS: -29 DEGREES
EKG T AXIS: -2 DEGREES
EKG VENTRICULAR RATE: 61 BPM
EOSINOPHIL # BLD: 0.17 K/UL (ref 0–0.8)
EOSINOPHIL NFR BLD: 3 % (ref 0.5–7.8)
EPI CELLS #/AREA URNS HPF: ABNORMAL /HPF
ERYTHROCYTE [DISTWIDTH] IN BLOOD BY AUTOMATED COUNT: 13.1 % (ref 11.9–14.6)
GLOBULIN SER CALC-MCNC: 3.1 G/DL (ref 2.3–3.5)
GLUCOSE SERPL-MCNC: 104 MG/DL (ref 70–99)
GLUCOSE UR QL STRIP.AUTO: NEGATIVE MG/DL
HCT VFR BLD AUTO: 40.1 % (ref 35.8–46.3)
HGB BLD-MCNC: 13.6 G/DL (ref 11.7–15.4)
HYALINE CASTS URNS QL MICRO: ABNORMAL /LPF
IMM GRANULOCYTES # BLD AUTO: 0.01 K/UL (ref 0–0.5)
IMM GRANULOCYTES NFR BLD AUTO: 0.2 % (ref 0–5)
KETONES UR-MCNC: NEGATIVE MG/DL
LEUKOCYTE ESTERASE UR QL STRIP: ABNORMAL
LYMPHOCYTES # BLD: 2.06 K/UL (ref 0.5–4.6)
LYMPHOCYTES NFR BLD: 35.9 % (ref 13–44)
MCH RBC QN AUTO: 30.4 PG (ref 26.1–32.9)
MCHC RBC AUTO-ENTMCNC: 33.9 G/DL (ref 31.4–35)
MCV RBC AUTO: 89.7 FL (ref 82–102)
MONOCYTES # BLD: 0.57 K/UL (ref 0.1–1.3)
MONOCYTES NFR BLD: 9.9 % (ref 4–12)
NEUTS SEG # BLD: 2.9 K/UL (ref 1.7–8.2)
NEUTS SEG NFR BLD: 50.5 % (ref 43–78)
NITRITE UR QL: NEGATIVE
NRBC # BLD: 0 K/UL (ref 0–0.2)
PH UR: 7.5 (ref 5–9)
PLATELET # BLD AUTO: 263 K/UL (ref 150–450)
PMV BLD AUTO: 10 FL (ref 9.4–12.3)
POTASSIUM SERPL-SCNC: 4.2 MMOL/L (ref 3.5–5.1)
PROT SERPL-MCNC: 6.6 G/DL (ref 6.3–8.2)
PROT UR QL: 30 MG/DL
RBC # BLD AUTO: 4.47 M/UL (ref 4.05–5.2)
RBC # UR STRIP: NEGATIVE
RBC #/AREA URNS HPF: ABNORMAL /HPF
SERVICE CMNT-IMP: ABNORMAL
SODIUM SERPL-SCNC: 139 MMOL/L (ref 136–145)
SP GR UR: 1.02 (ref 1–1.02)
TROPONIN T SERPL HS-MCNC: 36 NG/L (ref 0–14)
TROPONIN T SERPL HS-MCNC: 36 NG/L (ref 0–14)
UROBILINOGEN UR QL: 0.2 EU/DL (ref 0.2–1)
WBC # BLD AUTO: 5.7 K/UL (ref 4.3–11.1)
WBC URNS QL MICRO: ABNORMAL /HPF

## 2025-03-08 PROCEDURE — 84484 ASSAY OF TROPONIN QUANT: CPT

## 2025-03-08 PROCEDURE — 71045 X-RAY EXAM CHEST 1 VIEW: CPT

## 2025-03-08 PROCEDURE — 82550 ASSAY OF CK (CPK): CPT

## 2025-03-08 PROCEDURE — 96375 TX/PRO/DX INJ NEW DRUG ADDON: CPT

## 2025-03-08 PROCEDURE — 6360000002 HC RX W HCPCS: Performed by: FAMILY MEDICINE

## 2025-03-08 PROCEDURE — 81001 URINALYSIS AUTO W/SCOPE: CPT

## 2025-03-08 PROCEDURE — 87086 URINE CULTURE/COLONY COUNT: CPT

## 2025-03-08 PROCEDURE — 80053 COMPREHEN METABOLIC PANEL: CPT

## 2025-03-08 PROCEDURE — 6370000000 HC RX 637 (ALT 250 FOR IP): Performed by: FAMILY MEDICINE

## 2025-03-08 PROCEDURE — 2500000003 HC RX 250 WO HCPCS: Performed by: FAMILY MEDICINE

## 2025-03-08 PROCEDURE — 96374 THER/PROPH/DIAG INJ IV PUSH: CPT

## 2025-03-08 PROCEDURE — 93010 ELECTROCARDIOGRAM REPORT: CPT | Performed by: INTERNAL MEDICINE

## 2025-03-08 PROCEDURE — 93005 ELECTROCARDIOGRAM TRACING: CPT

## 2025-03-08 PROCEDURE — 70450 CT HEAD/BRAIN W/O DYE: CPT

## 2025-03-08 PROCEDURE — G0378 HOSPITAL OBSERVATION PER HR: HCPCS

## 2025-03-08 PROCEDURE — 73502 X-RAY EXAM HIP UNI 2-3 VIEWS: CPT

## 2025-03-08 PROCEDURE — 96372 THER/PROPH/DIAG INJ SC/IM: CPT

## 2025-03-08 PROCEDURE — 6360000002 HC RX W HCPCS

## 2025-03-08 PROCEDURE — 72125 CT NECK SPINE W/O DYE: CPT

## 2025-03-08 PROCEDURE — 99285 EMERGENCY DEPT VISIT HI MDM: CPT

## 2025-03-08 PROCEDURE — 85025 COMPLETE CBC W/AUTO DIFF WBC: CPT

## 2025-03-08 PROCEDURE — 2500000003 HC RX 250 WO HCPCS

## 2025-03-08 RX ORDER — ENOXAPARIN SODIUM 100 MG/ML
30 INJECTION SUBCUTANEOUS EVERY 24 HOURS
Status: DISCONTINUED | OUTPATIENT
Start: 2025-03-08 | End: 2025-03-10 | Stop reason: HOSPADM

## 2025-03-08 RX ORDER — SODIUM CHLORIDE 0.9 % (FLUSH) 0.9 %
5-40 SYRINGE (ML) INJECTION EVERY 12 HOURS SCHEDULED
Status: DISCONTINUED | OUTPATIENT
Start: 2025-03-08 | End: 2025-03-10 | Stop reason: HOSPADM

## 2025-03-08 RX ORDER — POTASSIUM CHLORIDE 1500 MG/1
40 TABLET, EXTENDED RELEASE ORAL PRN
Status: DISCONTINUED | OUTPATIENT
Start: 2025-03-08 | End: 2025-03-10 | Stop reason: HOSPADM

## 2025-03-08 RX ORDER — POTASSIUM CHLORIDE 7.45 MG/ML
10 INJECTION INTRAVENOUS PRN
Status: DISCONTINUED | OUTPATIENT
Start: 2025-03-08 | End: 2025-03-10 | Stop reason: HOSPADM

## 2025-03-08 RX ORDER — LISINOPRIL 20 MG/1
20 TABLET ORAL DAILY
Status: DISCONTINUED | OUTPATIENT
Start: 2025-03-08 | End: 2025-03-10 | Stop reason: HOSPADM

## 2025-03-08 RX ORDER — LISINOPRIL 20 MG/1
20 TABLET ORAL DAILY
Status: DISCONTINUED | OUTPATIENT
Start: 2025-03-09 | End: 2025-03-08

## 2025-03-08 RX ORDER — LACTOBACILLUS RHAMNOSUS GG 10B CELL
1 CAPSULE ORAL
Status: DISCONTINUED | OUTPATIENT
Start: 2025-03-09 | End: 2025-03-10 | Stop reason: HOSPADM

## 2025-03-08 RX ORDER — HYDRALAZINE HYDROCHLORIDE 20 MG/ML
5 INJECTION INTRAMUSCULAR; INTRAVENOUS EVERY 8 HOURS PRN
Status: DISCONTINUED | OUTPATIENT
Start: 2025-03-08 | End: 2025-03-10 | Stop reason: HOSPADM

## 2025-03-08 RX ORDER — PRAVASTATIN SODIUM 20 MG
20 TABLET ORAL
Status: DISCONTINUED | OUTPATIENT
Start: 2025-03-08 | End: 2025-03-10 | Stop reason: HOSPADM

## 2025-03-08 RX ORDER — MAGNESIUM SULFATE IN WATER 40 MG/ML
2000 INJECTION, SOLUTION INTRAVENOUS PRN
Status: DISCONTINUED | OUTPATIENT
Start: 2025-03-08 | End: 2025-03-10 | Stop reason: HOSPADM

## 2025-03-08 RX ORDER — SODIUM CHLORIDE 9 MG/ML
INJECTION, SOLUTION INTRAVENOUS PRN
Status: DISCONTINUED | OUTPATIENT
Start: 2025-03-08 | End: 2025-03-10 | Stop reason: HOSPADM

## 2025-03-08 RX ORDER — PANTOPRAZOLE SODIUM 40 MG/1
40 TABLET, DELAYED RELEASE ORAL
Status: DISCONTINUED | OUTPATIENT
Start: 2025-03-09 | End: 2025-03-10 | Stop reason: HOSPADM

## 2025-03-08 RX ORDER — ACETAMINOPHEN 650 MG/1
650 SUPPOSITORY RECTAL EVERY 6 HOURS PRN
Status: DISCONTINUED | OUTPATIENT
Start: 2025-03-08 | End: 2025-03-10 | Stop reason: HOSPADM

## 2025-03-08 RX ORDER — ASPIRIN 81 MG/1
81 TABLET ORAL DAILY
Status: DISCONTINUED | OUTPATIENT
Start: 2025-03-09 | End: 2025-03-10 | Stop reason: HOSPADM

## 2025-03-08 RX ORDER — SODIUM CHLORIDE 0.9 % (FLUSH) 0.9 %
5-40 SYRINGE (ML) INJECTION PRN
Status: DISCONTINUED | OUTPATIENT
Start: 2025-03-08 | End: 2025-03-10 | Stop reason: HOSPADM

## 2025-03-08 RX ORDER — ACETAMINOPHEN 325 MG/1
650 TABLET ORAL EVERY 6 HOURS PRN
Status: DISCONTINUED | OUTPATIENT
Start: 2025-03-08 | End: 2025-03-10 | Stop reason: HOSPADM

## 2025-03-08 RX ORDER — ONDANSETRON 4 MG/1
4 TABLET, ORALLY DISINTEGRATING ORAL EVERY 8 HOURS PRN
Status: DISCONTINUED | OUTPATIENT
Start: 2025-03-08 | End: 2025-03-10 | Stop reason: HOSPADM

## 2025-03-08 RX ORDER — ONDANSETRON 2 MG/ML
4 INJECTION INTRAMUSCULAR; INTRAVENOUS EVERY 6 HOURS PRN
Status: DISCONTINUED | OUTPATIENT
Start: 2025-03-08 | End: 2025-03-10 | Stop reason: HOSPADM

## 2025-03-08 RX ORDER — POLYETHYLENE GLYCOL 3350 17 G/17G
17 POWDER, FOR SOLUTION ORAL DAILY PRN
Status: DISCONTINUED | OUTPATIENT
Start: 2025-03-08 | End: 2025-03-10 | Stop reason: HOSPADM

## 2025-03-08 RX ORDER — LANOLIN ALCOHOL/MO/W.PET/CERES
1000 CREAM (GRAM) TOPICAL DAILY
Status: DISCONTINUED | OUTPATIENT
Start: 2025-03-09 | End: 2025-03-10 | Stop reason: HOSPADM

## 2025-03-08 RX ADMIN — ENOXAPARIN SODIUM 30 MG: 100 INJECTION SUBCUTANEOUS at 16:26

## 2025-03-08 RX ADMIN — SODIUM CHLORIDE, PRESERVATIVE FREE 10 ML: 5 INJECTION INTRAVENOUS at 21:04

## 2025-03-08 RX ADMIN — HYDRALAZINE HYDROCHLORIDE 5 MG: 20 INJECTION INTRAMUSCULAR; INTRAVENOUS at 16:26

## 2025-03-08 RX ADMIN — WATER 1000 MG: 1 INJECTION INTRAMUSCULAR; INTRAVENOUS; SUBCUTANEOUS at 14:34

## 2025-03-08 RX ADMIN — LISINOPRIL 20 MG: 20 TABLET ORAL at 16:26

## 2025-03-08 ASSESSMENT — PAIN SCALES - GENERAL
PAINLEVEL_OUTOF10: 5
PAINLEVEL_OUTOF10: 3
PAINLEVEL_OUTOF10: 3

## 2025-03-08 ASSESSMENT — PAIN - FUNCTIONAL ASSESSMENT: PAIN_FUNCTIONAL_ASSESSMENT: 0-10

## 2025-03-08 ASSESSMENT — PAIN DESCRIPTION - ORIENTATION
ORIENTATION: LEFT
ORIENTATION: LEFT

## 2025-03-08 ASSESSMENT — PAIN DESCRIPTION - PAIN TYPE: TYPE: CHRONIC PAIN

## 2025-03-08 ASSESSMENT — PAIN DESCRIPTION - LOCATION
LOCATION: HIP
LOCATION: HIP

## 2025-03-08 NOTE — ED TRIAGE NOTES
Pt presents with caregiver who found patient on the floor this morning when she arrived.  Pt is complaining of pain to left hip.  Pt is not sure if she hit her head.  Pt takes daily asa but no thinners.  Pt states she is unsure of how she ended up on the floor.  Pt states pain is \"not bad\".     **Patient is very hard of hearing**

## 2025-03-08 NOTE — ED TRIAGE NOTES
Pt presented to ED via EMS from home with c/o fall, per ems Home health found pt in the floor, unknown date and time. L hip pain is chronic, denies Head, neck pain,    No blood thinners    EMS Vitals  130/84 BP  68 HR  99% RA  110 BGL  98.1 TEMP  A&Ox3 - baseline

## 2025-03-08 NOTE — ED NOTES
TRANSFER - OUT REPORT:    Verbal report given to Sanjana GONZALEZ on Brenda Nobles  being transferred to [facility] for routine progression of patient care       Report consisted of patient's Situation, Background, Assessment and   Recommendations(SBAR).     Information from the following report(s) ED SBAR and Neuro Assessment was reviewed with the receiving nurse.    Lines:   Peripheral IV 02/05/25 Left;Posterior Forearm (Active)       Peripheral IV 03/08/25 Left Antecubital (Active)   Site Assessment Clean, dry & intact 03/08/25 1210   Line Status Blood return noted;Specimen collected;Normal saline locked 03/08/25 1210   Phlebitis Assessment No symptoms 03/08/25 1210   Infiltration Assessment 0 03/08/25 1210        Opportunity for questions and clarification was provided.      Patient transported with:  Tech

## 2025-03-08 NOTE — H&P
Hospitalist History and Physical   Admit Date:  3/8/2025 11:20 AM   Name:  Brenda Nobles   Age:  89 y.o.  Sex:  female  :  1932   MRN:  [id number]   Room:  HF/F    Presenting/Chief Complaint: Hip Pain and Fall     Reason(s) for Admission: No admission diagnoses are documented for this encounter.     History of Present Illness:   Brenda Nobles is a 89 y.o. female with medical history of CVA, DM2 who presented after she was found on the floor by caregiver 3/8.  She reported left hip pain and she was unsure if she lost consciousness or hit her head.  She was alert and orient x 3 on admission but stated that she could not recall exactly what happened.  She thinks that she got up in the middle of the night trying to get used to the bathroom and her rollator got away from under her, causing her to fall and hit the ground.  Her caregiver stated that when she came to the house, patient was found on the floor nervous but not confused.  Patient denies any headache or having any abrasions.  Patient lives by herself.  Patient denies fever, chills, SOB, chest pain, abdominal pain, dysuria, hematuria, back pain.    In the ED, VSS.  UA concerning for possible UTI. CT head/CT C-spine unremarkable.  X-ray left hip unremarkable. CXR unremarkable. Pt received Rocephin in ED.      Assessment & Plan:     Acute UTI  ABX: Rocephin (3/8-diarrhea)  Urine culture: Pending    S/p Fall  Unsure if she had a syncope episode.  CT head/CT C-spine unremarkable.  X-ray left hip unremarkable  Fall precautions  Monitor on telemetry  Check orthostatic VS  PT/OT to evaluate    History of CVA  HLD  Patient follows neurology Dr. Saldana outpatient  She uses [facility] by outpatient  Continue home statin  Continue ASA  Fall precautions    HTN  Continue home lisinopril    DM2  Hold home metformin  Check A1c  Hold off on SSI pending A1c    Management of Delirium      Non-pharmacological intervention  Reorient the patient throughout the day  Window  white matter. No acute intracranial hemorrhage or suspicious territorial hypodensity. No midline shift or mass effect. No acute subarachnoid or subdural hemorrhage. Chronic right maxillary sinusitis. No calvarial fracture. Cervical spine: There is no evidence of fracture or subluxation.  No bony lesions are seen. There is no prevertebral soft tissue swelling. Moderate multilevel degenerative changes including facet arthrosis. No significant spinal stenosis. Biapical pleural-parenchymal scarring within the lung apices.     No acute abnormality noted in cervical spine No acute intracranial abnormality. Electronically signed by Leonidas Dimas        Signed:  Xiao Meneses DO    Part of this note may have been written by using a voice dictation software.  The note has been proof read but may still contain some grammatical/other typographical errors.

## 2025-03-08 NOTE — ACP (ADVANCE CARE PLANNING)
Advance Care Planning Note   Admit Date:  3/8/2025 11:20 AM   Name:  Brenda Nobles   Age:  89 y.o.  Sex:  female  :  1932   MRN:  [id number]   Room:  HF/F    Brenda Nobles has capacity to make her own decisions:   Yes    If pt unable to make decisions, POA/surrogate decision maker:  Son Mario Nobles    Patient was alert and orient x 3 on admission.  Caregiver at bedside.  We discussed plan of care as well as goals of care.  Patient reported that she does have a living will.  Her son Mario is her healthcare power of .  She was firm on DNR/DNI.  All questions answered.    Patient or surrogate consented to discussion of the current conditions, workup, management plans, prognosis, and the risk for further deterioration.  Time spent: 17 minutes in direct discussion.      Signed:  Xiao Meneses DO

## 2025-03-08 NOTE — DISCHARGE INSTRUCTIONS
Urinary Tract Infection (UTI) in Women: Care Instructions  Overview     A urinary tract infection (UTI) is an infection caused by bacteria. It can happen anywhere in the urinary tract. A UTI can happen in the:  Kidneys.  Ureters, the tubes that connect the kidneys to the bladder.  Bladder.  Urethra, where the urine comes out.  Most UTIs are bladder infections. They often cause pain or burning when you urinate.  Most UTIs can be cured with antibiotics. If you are prescribed antibiotics, be sure to complete your treatment so that the infection does not get worse.  Follow-up care is a key part of your treatment and safety. Be sure to make and go to all appointments, and call your doctor if you are having problems. It's also a good idea to know your test results and keep a list of the medicines you take.  How can you care for yourself at home?  Take your antibiotics as directed. Do not stop taking them just because you feel better. You need to take the full course of antibiotics.  Drink extra water and other fluids for the next day or two. This will help make the urine less concentrated and help wash out the bacteria that are causing the infection. (If you have kidney, heart, or liver disease and have to limit fluids, talk with your doctor before you increase the amount of fluids you drink.)  Avoid drinks that are carbonated or have caffeine. They can irritate the bladder.  Urinate often. Try to empty your bladder each time.  To relieve pain, take a hot bath or lay a heating pad set on low over your lower belly or genital area. Never go to sleep with a heating pad in place.  To prevent UTIs  Drink plenty of water each day. This helps you urinate often, which clears bacteria from your system. (If you have kidney, heart, or liver disease and have to limit fluids, talk with your doctor before you increase the amount of fluids you drink.)  Urinate when you need to.  If you are sexually active, urinate right after you  have sex.  Change sanitary pads often.  Avoid douches, bubble baths, feminine hygiene sprays, and other feminine hygiene products that have deodorants.  After going to the bathroom, wipe from front to back.  When should you call for help?   Call your doctor now or seek immediate medical care if:    You have new or worse fever, chills, nausea, or vomiting.     You have new pain in your back just below your rib cage. This is called flank pain.     There is new blood or pus in your urine.     You have any problems with your antibiotic medicine.   Watch closely for changes in your health, and be sure to contact your doctor if:    You are not getting better after taking an antibiotic for 2 days.     Your symptoms go away but then come back.   Where can you learn more?  Go to [email or web address].net/patientEd and enter K848 to learn more about \"Urinary Tract Infection (UTI) in Women: Care Instructions.\"  Current as of: April 30, 2024  Content Version: 14.3  © 2024 [employer].   Care instructions adapted under license by [employer]. If you have questions about a medical condition or this instruction, always ask your healthcare professional. [employer] disclaims any warranty or liability for your use of this information.

## 2025-03-08 NOTE — ED PROVIDER NOTES
Emergency Department Provider Note       PCP: Love Perera MD   Age: 89 y.o.   Sex: female     DISPOSITION Decision To Admit 03/08/2025 02:21:27 PM    ICD-10-CM    1. Fall, initial encounter  W19.XXXA       2. Acute cystitis without hematuria  N30.00           Medical Decision Making     Patient is a 89-year-old female with past medical history of CVA, type 2 diabetes, hypertension, and arthritis presenting after a fall.  Patient was found down on the ground in her bedroom this morning.  She was unable to get up and the fire department had to come help her up.  Patient is unsure when she fell and does not remember anything surrounding the events of the fall.     On presentation, patient is afebrile, vital signs are stable, and patient is well-appearing in no acute distress.  Some slight tenderness to the lateral left hip, no obvious deformity.  Otherwise patient well-appearing in no acute distress.    As we are unsure of the events surrounding her fall, will obtain a fairly broad workup with lab work including troponins, CK, urine sample, CT of patient's head and neck, and x-ray of patient's chest and hip.    Caregiver states that this is abnormal for patient to not remember when she fell, patient does live at home by herself but has caregivers, and look after her throughout the day but at night she is by herself.  She normally will tell the caregiver when she has fallen when the caregiver gets to her house.    Her EKG reveals normal sinus rhythm at 61 bpm, no evidence of STEMI.  Troponins are stable x 2.    CBC was stable H&H no evidence of leukocytosis.  CMP with stable electrolytes, kidney, and hepatic function.  CK is not elevated.    Urinalysis reveals evidence of infection, will send for culture and give a dose of Rocephin.    CT scan of patient's head and neck were unremarkable.  X-ray of the hip and chest did not reveal any acute abnormalities.    As we are unsure if patient had a syncope and
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
## 2025-03-09 LAB
ANION GAP SERPL CALC-SCNC: 11 MMOL/L (ref 7–16)
BASOPHILS # BLD: 0.03 K/UL (ref 0–0.2)
BASOPHILS NFR BLD: 0.6 % (ref 0–2)
BUN SERPL-MCNC: 21 MG/DL (ref 8–23)
CALCIUM SERPL-MCNC: 9.2 MG/DL (ref 8.8–10.2)
CHLORIDE SERPL-SCNC: 104 MMOL/L (ref 98–107)
CO2 SERPL-SCNC: 27 MMOL/L (ref 20–29)
CREAT SERPL-MCNC: 0.72 MG/DL (ref 0.6–1.1)
DIFFERENTIAL METHOD BLD: ABNORMAL
EOSINOPHIL # BLD: 0.17 K/UL (ref 0–0.8)
EOSINOPHIL NFR BLD: 3.2 % (ref 0.5–7.8)
ERYTHROCYTE [DISTWIDTH] IN BLOOD BY AUTOMATED COUNT: 13 % (ref 11.9–14.6)
EST. AVERAGE GLUCOSE BLD GHB EST-MCNC: 134 MG/DL
GLUCOSE SERPL-MCNC: 94 MG/DL (ref 70–99)
HBA1C MFR BLD: 6.3 % (ref 0–5.6)
HCT VFR BLD AUTO: 38.3 % (ref 35.8–46.3)
HGB BLD-MCNC: 12.9 G/DL (ref 11.7–15.4)
IMM GRANULOCYTES # BLD AUTO: 0.01 K/UL (ref 0–0.5)
IMM GRANULOCYTES NFR BLD AUTO: 0.2 % (ref 0–5)
LYMPHOCYTES # BLD: 2.27 K/UL (ref 0.5–4.6)
LYMPHOCYTES NFR BLD: 42.8 % (ref 13–44)
MCH RBC QN AUTO: 30.4 PG (ref 26.1–32.9)
MCHC RBC AUTO-ENTMCNC: 33.7 G/DL (ref 31.4–35)
MCV RBC AUTO: 90.3 FL (ref 82–102)
MONOCYTES # BLD: 0.6 K/UL (ref 0.1–1.3)
MONOCYTES NFR BLD: 11.3 % (ref 4–12)
NEUTS SEG # BLD: 2.22 K/UL (ref 1.7–8.2)
NEUTS SEG NFR BLD: 41.9 % (ref 43–78)
NRBC # BLD: 0 K/UL (ref 0–0.2)
PLATELET # BLD AUTO: 243 K/UL (ref 150–450)
PMV BLD AUTO: 10.1 FL (ref 9.4–12.3)
POTASSIUM SERPL-SCNC: 4 MMOL/L (ref 3.5–5.1)
RBC # BLD AUTO: 4.24 M/UL (ref 4.05–5.2)
SODIUM SERPL-SCNC: 142 MMOL/L (ref 136–145)
WBC # BLD AUTO: 5.3 K/UL (ref 4.3–11.1)

## 2025-03-09 PROCEDURE — 36415 COLL VENOUS BLD VENIPUNCTURE: CPT

## 2025-03-09 PROCEDURE — 85025 COMPLETE CBC W/AUTO DIFF WBC: CPT

## 2025-03-09 PROCEDURE — 6360000002 HC RX W HCPCS: Performed by: FAMILY MEDICINE

## 2025-03-09 PROCEDURE — 97165 OT EVAL LOW COMPLEX 30 MIN: CPT

## 2025-03-09 PROCEDURE — G0378 HOSPITAL OBSERVATION PER HR: HCPCS

## 2025-03-09 PROCEDURE — 96372 THER/PROPH/DIAG INJ SC/IM: CPT

## 2025-03-09 PROCEDURE — 96376 TX/PRO/DX INJ SAME DRUG ADON: CPT

## 2025-03-09 PROCEDURE — 80048 BASIC METABOLIC PNL TOTAL CA: CPT

## 2025-03-09 PROCEDURE — 97530 THERAPEUTIC ACTIVITIES: CPT

## 2025-03-09 PROCEDURE — 97161 PT EVAL LOW COMPLEX 20 MIN: CPT

## 2025-03-09 PROCEDURE — 2500000003 HC RX 250 WO HCPCS: Performed by: FAMILY MEDICINE

## 2025-03-09 PROCEDURE — 83036 HEMOGLOBIN GLYCOSYLATED A1C: CPT

## 2025-03-09 PROCEDURE — 6370000000 HC RX 637 (ALT 250 FOR IP): Performed by: FAMILY MEDICINE

## 2025-03-09 PROCEDURE — 97535 SELF CARE MNGMENT TRAINING: CPT

## 2025-03-09 PROCEDURE — 92610 EVALUATE SWALLOWING FUNCTION: CPT

## 2025-03-09 RX ADMIN — SODIUM CHLORIDE, PRESERVATIVE FREE 10 ML: 5 INJECTION INTRAVENOUS at 21:08

## 2025-03-09 RX ADMIN — Medication 1 CAPSULE: at 09:29

## 2025-03-09 RX ADMIN — Medication 1000 MCG: at 09:29

## 2025-03-09 RX ADMIN — ASPIRIN 81 MG: 81 TABLET, COATED ORAL at 09:29

## 2025-03-09 RX ADMIN — WATER 1000 MG: 1 INJECTION INTRAMUSCULAR; INTRAVENOUS; SUBCUTANEOUS at 13:54

## 2025-03-09 RX ADMIN — ENOXAPARIN SODIUM 30 MG: 100 INJECTION SUBCUTANEOUS at 18:06

## 2025-03-09 RX ADMIN — PANTOPRAZOLE SODIUM 40 MG: 40 TABLET, DELAYED RELEASE ORAL at 05:30

## 2025-03-09 RX ADMIN — LISINOPRIL 20 MG: 20 TABLET ORAL at 09:29

## 2025-03-09 ASSESSMENT — PAIN SCALES - GENERAL: PAINLEVEL_OUTOF10: 0

## 2025-03-09 NOTE — ACP (ADVANCE CARE PLANNING)
Advance Care Planning   General Advance Care Planning (ACP) Conversation    Date of Conversation: 3/8/2025  Conducted with: Patient with Decision Making Capacity  Other persons present: None    Healthcare Decision Maker:    Primary Decision Maker: Mario Nobles - Child - [phone]      Content/Action Overview:  Has ACP document(s) on file - reflects the patient's care preferences    Length of Voluntary ACP Conversation in minutes:  <16 minutes (Non-Billable)    Laurie Graham

## 2025-03-09 NOTE — PROGRESS NOTES
Hospitalist Progress Note   Admit Date:  3/8/2025 11:20 AM   Name:  Brenda Nobles   Age:  89 y.o.  Sex:  female  :  1932   MRN:  [id number]   Room:  [facility]/    Presenting/Chief Complaint: Hip Pain and Fall     Reason(s) for Admission: Acute UTI [N39.0]  Acute cystitis without hematuria [N30.00]  Fall, initial encounter [W19.XXXA]     Hospital Course:   89 y.o. female with medical history of CVA, DM2 who presented after she was found on the floor by caregiver 3/8.  She reported left hip pain and she was unsure if she lost consciousness or hit her head.  She was alert and orient x 3 on admission but stated that she could not recall exactly what happened.  She thinks that she got up in the middle of the night trying to get used to the bathroom and her rollator got away from under her, causing her to fall and hit the ground.  Her caregiver stated that when she came to the house, patient was found on the floor nervous but not confused.  Patient denies any headache or having any abrasions.  Patient lives by herself.  Patient denies fever, chills, SOB, chest pain, abdominal pain, dysuria, hematuria, back pain.     In the ED, VSS.  UA concerning for possible UTI. CT head/CT C-spine unremarkable.  X-ray left hip unremarkable. CXR unremarkable. Pt received Rocephin in ED.    Patient was admitted due to fall at home and acute UTI.  Orthostatic vitals were checked.  She was started on broad-spectrum IV antibiotic.  Urine culture pending.  PT/OT evaluated      Subjective & 24hr Events:     Patient sitting up in recliner.  Hard of hearing but overall answering questions appropriately.  Caregiver at bedside.  Patient denies any needs.  No pain or discomfort.  No SOB.  No fever or chills.      Assessment & Plan:     Acute UTI  ABX: Rocephin (3/8-...)  Urine culture: NGTD     S/p Fall  Unsure if she had a syncope episode.  CT head/CT C-spine unremarkable.  X-ray left hip unremarkable  Fall precautions  Monitor on  1954 98.8 °F (37.1 °C) 72 16 (!) 127/50 93 %   03/08/25 1719 98.4 °F (36.9 °C) 68 17 (!) 190/62 95 %   03/08/25 1558 97.9 °F (36.6 °C) 64 17 (!) 206/81 100 %   03/08/25 1509 -- 66 16 (!) 178/84 94 %   03/08/25 1106 97.8 °F (36.6 °C) 69 16 (!) 145/76 98 %       Oxygen Therapy  SpO2: 93 %  O2 Device: None (Room air)    Estimated body mass index is 20.78 kg/m² as calculated from the following:    Height as of this encounter: 1.549 m (5' 1\").    Weight as of this encounter: 49.9 kg (110 lb).    Intake/Output Summary (Last 24 hours) at 3/9/2025 0724  Last data filed at 3/9/2025 0613  Gross per 24 hour   Intake --   Output 300 ml   Net -300 ml         Physical Exam:     General:    Well nourished.    Head:  Normocephalic, atraumatic  Eyes:  Sclerae appear normal.  Pupils equally round.  ENT:  Nares appear normal.  Moist oral mucosa  Neck:  No restricted ROM.  Trachea midline   CV:   RRR.  No m/r/g.  No jugular venous distension.  Lungs:   CTAB.  No wheezing, rhonchi, or rales.  Symmetric expansion.  Abdomen:   Soft, nontender, nondistended.  Extremities: No cyanosis or clubbing.  No edema  Skin:     No rashes.  Normal coloration.   Warm and dry.    Neuro:  CN II-XII grossly intact.    Psych:  Normal mood and affect.      I have personally reviewed labs and tests:  Recent Labs:  Recent Results (from the past 48 hours)   POCT Urinalysis no Micro    Collection Time: 03/08/25 11:34 AM   Result Value Ref Range    Specific Gravity, Urine, POC 1.020 1.001 - 1.023      pH, Urine, POC 7.5 5.0 - 9.0      Protein, Urine, POC 30 (A) NEG mg/dL    Glucose, UA POC Negative NEG mg/dL    Ketones, Urine, POC Negative NEG mg/dL    Bilirubin, Urine, POC Negative NEG      Blood, UA POC Negative NEG      URINE UROBILINOGEN POC 0.2 0.2 - 1.0 EU/dL    Nitrite, Urine, POC Negative NEG      Leukocyte Est, UA POC TRACE (A) NEG      Performed by: Meche Lemos    Urinalysis, Micro    Collection Time: 03/08/25 11:37 AM   Result Value Ref Range

## 2025-03-09 NOTE — PROGRESS NOTES
ACUTE OCCUPATIONAL THERAPY GOALS:   (Developed with and agreed upon by patient and/or caregiver.)  1. Patient will perform lower body dressing with min assist.  2. Patient will perform upper and lower body bathing with min assist.  3. Patient will perform toilet transfers with stand by assist.  4. Patient will participate in 25 + minutes of ADL/ therapeutic exercise/therapeutic activity with min rest breaks to increase activity tolerance for self care.  5. Patient will perform standing grooming tasks x5 mins with stand by assist.  6. Patient will perform ADL functional mobility in room with stand by assist.    Goals to be achieved in 7 days.      OCCUPATIONAL THERAPY Initial Assessment and Daily Note       OT Visit Days: 1  Acknowledge Orders  Time  OT Charge Capture  Rehab Caseload Tracker      Brenda Nobles is a 89 y.o. female   PRIMARY DIAGNOSIS: Acute UTI  Acute UTI [N39.0]  Acute cystitis without hematuria [N30.00]  Fall, initial encounter [W19.XXXA]       Reason for Referral: Generalized Muscle Weakness (M62.81)  Other lack of cordination (R27.8)  Observation: Payor: MEDICARE / Plan: MEDICARE PART A AND B / Product Type: *No Product type* /     ASSESSMENT:     REHAB RECOMMENDATIONS:   Recommendation to date pending progress:  Setting:  Home Health Therapy  Home with caregiver assistance     Equipment:    None     ASSESSMENT:  Brenda Nobles is a 89 y.o. admitted for acute UTI and fall at home. Patient states she lives alone, she has caregivers 5 days a week and neighbors/friends that check in on her. She reports prior level of function as needs assistance with all ADLs, IADLs, and performs functional/community mobility with the use of an assistive device (rollator). Based on OT evaluation completed this date, her current level of function is contact guard assist-min assist with rolling walker and stand by assist-max assist for ADL tasks. Some confusion noted, needed verbal cues for sequencing and safety.  need reinforcement at next session.     TREATMENT GRID:  N/A    AFTER TREATMENT PRECAUTIONS: Alarm Activated, Bed/Chair Locked, Call light within reach, Chair, Heels floated, Needs within reach, and Visitors at bedside    INTERDISCIPLINARY COLLABORATION:  RN/ PCT and PT/ PTA    EDUCATION:  Education Given To: Patient  Education Provided: Role of Therapy;Plan of Care    TOTAL TREATMENT DURATION AND TIME:  Time In: 0840  Time Out: 0902  Minutes: 22    Vickie Ellsworth, OT

## 2025-03-09 NOTE — PLAN OF CARE
Problem: Neurosensory - Adult  Goal: Achieves stable or improved neurological status  Outcome: Progressing     Problem: Neurosensory - Adult  Goal: Achieves maximal functionality and self care  Outcome: Progressing     Problem: Musculoskeletal - Adult  Goal: Return mobility to safest level of function  Outcome: Progressing     Problem: Musculoskeletal - Adult  Goal: Return ADL status to a safe level of function  Outcome: Progressing     Problem: Genitourinary - Adult  Goal: Absence of urinary retention  Outcome: Progressing

## 2025-03-09 NOTE — CARE COORDINATION
CM note:    Chart reviewed for updates. CM met with pt at bedside, introduced role, confirmed demographics and discussed d/c planning. Pt lives alone in a 1 level home level entry. She is independent with ambulation but receives assistance with bathing and dressing from her care givers. Pt has care givers that come 7 days a week, 5 hours in the morning and 3 hours at night. She is insured and has a PCP that she sees as needed. She has an upcomming PCP appointment soon. Pt is no longer an active  in the community. Pt has been to [facility] in the past for STR. Pt has also used home health serivces in the past. Pt has a good local support system. Therapy is recommending home health. CM has sent referral to [facility] via [facility] for HH PT/OT/RN. GONZALES given. Pt is pending medical clearance. CM will continue to follow up with d/c planning.     03/09/25 1204   Service Assessment   Patient Orientation Alert and Oriented   Cognition Alert   History Provided By Patient   Primary Caregiver Self   Accompanied By/Relationship Care giver at bedside   Support Systems Children;Other (Comment);Friends/Neighbors  (Care givers)   Patient's Healthcare Decision Maker is: Legal Next of Kin  (Son)   PCP Verified by CM Yes   Last Visit to PCP Within last 3 months   Prior Functional Level Independent in ADLs/IADLs   Current Functional Level Independent in ADLs/IADLs   Can patient return to prior living arrangement Yes   Ability to make needs known: Good   Family able to assist with home care needs: Yes   Would you like for me to discuss the discharge plan with any other family members/significant others, and if so, who? Yes  (Care giver)   Financial Resources Medicare;Other (Comment)  ([employer])   Community Resources None   Social/Functional History   Lives With Alone   Type of Home House   Home Layout One level   Home Access Level entry   Bathroom Shower/Tub Walk-in shower   Bathroom Equipment Shower chair   Home Equipment

## 2025-03-09 NOTE — PROGRESS NOTES
ACUTE PHYSICAL THERAPY GOALS:   (Developed with and agreed upon by patient and/or caregiver.)  DISCHARGE GOALS :  (1.)Ms. Nobles will move from supine to sit and sit to supine  with SUPERVISION   (2.)Ms. Nobles will transfer from bed to chair and chair to bed with STAND BY ASSIST using a walker/Rolator.  (3.)Ms. Nobles will ambulate with STAND BY ASSIST for 50-75 feet with a rolling walker/Rolator.  ________________________     PHYSICAL THERAPY Initial Assessment and AM  (Link to Caseload Tracking: PT Visit Days : 1  Acknowledge Orders  Time In/Out  PT Charge Capture  Rehab Caseload Tracker    Brenda Nobles is a 89 y.o. female   PRIMARY DIAGNOSIS: Acute UTI  Acute UTI [N39.0]  Acute cystitis without hematuria [N30.00]  Fall, initial encounter [W19.XXXA]       Reason for Referral: Generalized Muscle Weakness (M62.81)  Other lack of cordination (R27.8)  Difficulty in walking, Not elsewhere classified (R26.2)  Other abnormalities of gait and mobility (R26.89)  History of falling (Z91.81)  Observation: Payor: MEDICARE / Plan: MEDICARE PART A AND B / Product Type: *No Product type* /     ASSESSMENT:     REHAB RECOMMENDATIONS:   Recommendation to date pending progress:  Setting:  Home Health Therapy  Pt not safe currently to be home alone    Equipment:     Pt has Rolator     ASSESSMENT:  Ms. Nobles presented as alert & oriented x 4, followed cues & participated well with therapy activity noted. Pt was admitted with UTI resulting in weakness & a fall. Pt has weakness in left LE with drop foot which is likely contributing to her instability since it is not braced. This pt will benefit from follow up therapy to help restore safe function prior to returning home. Pt has a caregiver 8 hr a day 5 x a week but currently will not be safe to be home alone until therapy can get pt back to her functional baseline. PT will follow & work toward goals noted.     [facility] AM-PAC™ “6 Clicks” Basic Mobility Inpatient Short

## 2025-03-09 NOTE — PROGRESS NOTES
GOALS:  LTG: Patient will maintain adequate hydration/nutrition with optimum safety and efficiency of swallowing function with PO intake without overt signs or symptoms of aspiration for the highest appropriate diet level.  STG:  Patient will consume soft and bite-sized textures and thin liquids without overt signs or symptoms of airway compromise.  Patient will safely ingest diet trials during therapeutic feedings with SLP without overt signs or symptoms of respiratory compromise in efforts to advance diet.  Patient will perform laryngeal/base of tongue exercises x10 each  with moderate cues with 80% accuracy to increase strength and coordination of pharyngeal/lingual musculature for bolus control/safer swallow function.  Patient will complete a Modified Barium Swallow study to fully assess physiology and anatomy of the swallow and determine safest appropriate diet and/or rehabilitation strategies, as medically indicated.    SPEECH LANGUAGE PATHOLOGY: DYSPHAGIA Initial Assessment    Acknowledge Order  I  Therapy Time  I   Charges     I  Rehab Caseload Tracker      NAME: Brenda Nobles  : 1932  MRN: [id number]    ADMISSION DATE: 3/8/2025  PRIMARY DIAGNOSIS: Acute UTI    ICD-10: Treatment Diagnosis: R13.12 Dysphagia, Oropharyngeal Phase    RECOMMENDATIONS   Diet:    Soft and Bite-Sized  Thin Liquids    Medication: as tolerated and whole floated in puree or applesauce   Compensatory Swallowing Strategies:   Alternate solids and liquids  Slow rate of intake  Remain upright for 30-45 minutes after meals  Upright as possible for all oral intake   Therapeutic Intervention:   Patient/family education  Instrumental swallow assessment  Dysphagia treatment   Patient continues to require skilled intervention:  Yes. Recommend ongoing speech therapy services during this hospitalization.     Anticipated Discharge Needs: Ongoing speech therapy is recommended at next level of care.      ASSESSMENT    Patient presents with

## 2025-03-10 ENCOUNTER — APPOINTMENT (OUTPATIENT)
Dept: GENERAL RADIOLOGY | Age: 89
DRG: 556 | End: 2025-03-10
Payer: MEDICARE

## 2025-03-10 VITALS
BODY MASS INDEX: 20.77 KG/M2 | OXYGEN SATURATION: 96 % | TEMPERATURE: 97.7 F | SYSTOLIC BLOOD PRESSURE: 127 MMHG | HEIGHT: 61 IN | DIASTOLIC BLOOD PRESSURE: 62 MMHG | WEIGHT: 110 LBS | HEART RATE: 75 BPM | RESPIRATION RATE: 14 BRPM

## 2025-03-10 LAB
ANION GAP SERPL CALC-SCNC: 8 MMOL/L (ref 7–16)
BASOPHILS # BLD: 0.03 K/UL (ref 0–0.2)
BASOPHILS NFR BLD: 0.6 % (ref 0–2)
BUN SERPL-MCNC: 19 MG/DL (ref 8–23)
CALCIUM SERPL-MCNC: 9.3 MG/DL (ref 8.8–10.2)
CHLORIDE SERPL-SCNC: 105 MMOL/L (ref 98–107)
CO2 SERPL-SCNC: 26 MMOL/L (ref 20–29)
CREAT SERPL-MCNC: 0.71 MG/DL (ref 0.6–1.1)
DIFFERENTIAL METHOD BLD: NORMAL
EOSINOPHIL # BLD: 0.15 K/UL (ref 0–0.8)
EOSINOPHIL NFR BLD: 3.1 % (ref 0.5–7.8)
ERYTHROCYTE [DISTWIDTH] IN BLOOD BY AUTOMATED COUNT: 13.1 % (ref 11.9–14.6)
GLUCOSE SERPL-MCNC: 133 MG/DL (ref 70–99)
HCT VFR BLD AUTO: 38.2 % (ref 35.8–46.3)
HGB BLD-MCNC: 12.6 G/DL (ref 11.7–15.4)
IMM GRANULOCYTES # BLD AUTO: 0.02 K/UL (ref 0–0.5)
IMM GRANULOCYTES NFR BLD AUTO: 0.4 % (ref 0–5)
LYMPHOCYTES # BLD: 1.99 K/UL (ref 0.5–4.6)
LYMPHOCYTES NFR BLD: 41 % (ref 13–44)
MCH RBC QN AUTO: 30 PG (ref 26.1–32.9)
MCHC RBC AUTO-ENTMCNC: 33 G/DL (ref 31.4–35)
MCV RBC AUTO: 91 FL (ref 82–102)
MONOCYTES # BLD: 0.57 K/UL (ref 0.1–1.3)
MONOCYTES NFR BLD: 11.8 % (ref 4–12)
NEUTS SEG # BLD: 2.09 K/UL (ref 1.7–8.2)
NEUTS SEG NFR BLD: 43.1 % (ref 43–78)
NRBC # BLD: 0 K/UL (ref 0–0.2)
PLATELET # BLD AUTO: 242 K/UL (ref 150–450)
PMV BLD AUTO: 10.2 FL (ref 9.4–12.3)
POTASSIUM SERPL-SCNC: 3.8 MMOL/L (ref 3.5–5.1)
RBC # BLD AUTO: 4.2 M/UL (ref 4.05–5.2)
SODIUM SERPL-SCNC: 139 MMOL/L (ref 136–145)
WBC # BLD AUTO: 4.9 K/UL (ref 4.3–11.1)

## 2025-03-10 PROCEDURE — 80048 BASIC METABOLIC PNL TOTAL CA: CPT

## 2025-03-10 PROCEDURE — 92611 MOTION FLUOROSCOPY/SWALLOW: CPT

## 2025-03-10 PROCEDURE — 2500000003 HC RX 250 WO HCPCS: Performed by: FAMILY MEDICINE

## 2025-03-10 PROCEDURE — 74230 X-RAY XM SWLNG FUNCJ C+: CPT

## 2025-03-10 PROCEDURE — G0378 HOSPITAL OBSERVATION PER HR: HCPCS

## 2025-03-10 PROCEDURE — 97530 THERAPEUTIC ACTIVITIES: CPT

## 2025-03-10 PROCEDURE — 6370000000 HC RX 637 (ALT 250 FOR IP): Performed by: FAMILY MEDICINE

## 2025-03-10 PROCEDURE — 1100000000 HC RM PRIVATE

## 2025-03-10 PROCEDURE — 36415 COLL VENOUS BLD VENIPUNCTURE: CPT

## 2025-03-10 PROCEDURE — 85025 COMPLETE CBC W/AUTO DIFF WBC: CPT

## 2025-03-10 RX ADMIN — BARIUM SULFATE 15 ML: 400 PASTE ORAL at 10:10

## 2025-03-10 RX ADMIN — Medication 1000 MCG: at 10:28

## 2025-03-10 RX ADMIN — ASPIRIN 81 MG: 81 TABLET, COATED ORAL at 10:28

## 2025-03-10 RX ADMIN — Medication 1 CAPSULE: at 10:25

## 2025-03-10 RX ADMIN — LISINOPRIL 20 MG: 20 TABLET ORAL at 10:25

## 2025-03-10 RX ADMIN — BARIUM SULFATE 30 ML: 0.81 POWDER, FOR SUSPENSION ORAL at 10:08

## 2025-03-10 RX ADMIN — PANTOPRAZOLE SODIUM 40 MG: 40 TABLET, DELAYED RELEASE ORAL at 06:00

## 2025-03-10 NOTE — FLOWSHEET NOTE
03/10/25 1352   AVS Reviewed   AVS & discharge instructions reviewed with patient and/or representative? Yes   Reviewed instructions with Patient   Level of Understanding Questions answered     IV removed without complications.

## 2025-03-10 NOTE — PLAN OF CARE
Problem: Chronic Conditions and Co-morbidities  Goal: Patient's chronic conditions and co-morbidity symptoms are monitored and maintained or improved  Outcome: Progressing     Problem: Pain  Goal: Verbalizes/displays adequate comfort level or baseline comfort level  Outcome: Progressing     Problem: Safety - Adult  Goal: Free from fall injury  Outcome: Progressing     Problem: Neurosensory - Adult  Goal: Achieves stable or improved neurological status  Outcome: Progressing     Problem: Neurosensory - Adult  Goal: Achieves maximal functionality and self care  Outcome: Progressing     Problem: Gastrointestinal - Adult  Goal: Minimal or absence of nausea and vomiting  Outcome: Progressing     Problem: Gastrointestinal - Adult  Goal: Maintains or returns to baseline bowel function  Outcome: Progressing     Problem: Genitourinary - Adult  Goal: Absence of urinary retention  Outcome: Progressing     Problem: Skin/Tissue Integrity - Adult  Goal: Skin integrity remains intact  Outcome: Progressing     Problem: Metabolic/Fluid and Electrolytes - Adult  Goal: Electrolytes maintained within normal limits  Outcome: Progressing

## 2025-03-10 NOTE — CARE COORDINATION
CM note:    Update note: CM met with pt and her friend Martha Garrido at bedside. CM gave pt's friend Martha updates regarding arrangement of home health. IMM given and explained to the patient and her friend; signed copy scanned to medical records. No further CM needs identified.     Initial note: Chart reviewed for updates. It was discussed in IDR's that pt is medically stable for discharge. [facility] has accepted pt for home health services. They have been notified that pt is discharging today. CM met with pt at bedside to discuss d/c planning. Pt made aware that she will be discharging today. She was also notified that home health was arranged with [facility] for PT/OT/RN. She is agreeable with home health. CM attempted to give pt the IMM, pt reported that she would like for CM to discuss the IMM with her friend who takes care of all her medical needs. CM to meet with pt and her friend once she gets to the hospital. CM will continue to follow up with d/c planning.     03/10/25 1127   Services At/After Discharge   Transition of Care Consult (CM Consult) Home Health   Internal Home Health No   Reason Outside Agency Chosen Patient already serviced by other home care/hospice agency   Services At/After Discharge Home Health;OT;PT;Nursing services    Resource Information Provided? No   Mode of Transport at Discharge Other (see comment)  (Friend)   Confirm Follow Up Transport Family   Condition of Participation: Discharge Planning   The Plan for Transition of Care is related to the following treatment goals: Pt is discharging home with [facility] PT/OT/RN   The Patient and/or Patient Representative was provided with a Choice of Provider? Patient   The Patient and/Or Patient Representative agree with the Discharge Plan? Yes   Freedom of Choice list was provided with basic dialogue that supports the patient's individualized plan of care/goals, treatment preferences, and shares the quality data  associated with the providers?  Yes     OSWALD Hackett

## 2025-03-10 NOTE — DISCHARGE SUMMARY
Order Status: Completed Specimen: Urine, clean catch Updated: 03/09/25 3934     Special Requests NO SPECIAL REQUESTS        Culture       No growth after short period of incubation. Further results to follow after overnight incubation.                  All Labs from Last 24 Hrs:  Recent Results (from the past 24 hours)   Basic Metabolic Panel w/ Reflex to MG    Collection Time: 03/10/25  3:28 AM   Result Value Ref Range    Sodium 139 136 - 145 mmol/L    Potassium 3.8 3.5 - 5.1 mmol/L    Chloride 105 98 - 107 mmol/L    CO2 26 20 - 29 mmol/L    Anion Gap 8 7 - 16 mmol/L    Glucose 133 (H) 70 - 99 mg/dL    BUN 19 8 - 23 MG/DL    Creatinine 0.71 0.60 - 1.10 MG/DL    Est, Glom Filt Rate 80 >60 ml/min/1.73m2    Calcium 9.3 8.8 - 10.2 MG/DL   CBC with Auto Differential    Collection Time: 03/10/25  3:28 AM   Result Value Ref Range    WBC 4.9 4.3 - 11.1 K/uL    RBC 4.20 4.05 - 5.2 M/uL    Hemoglobin 12.6 11.7 - 15.4 g/dL    Hematocrit 38.2 35.8 - 46.3 %    MCV 91.0 82.0 - 102.0 FL    MCH 30.0 26.1 - 32.9 PG    MCHC 33.0 31.4 - 35.0 g/dL    RDW 13.1 11.9 - 14.6 %    Platelets 242 150 - 450 K/uL    MPV 10.2 9.4 - 12.3 FL    nRBC 0.00 0.0 - 0.2 K/uL    Differential Type AUTOMATED      Neutrophils % 43.1 43.0 - 78.0 %    Lymphocytes % 41.0 13.0 - 44.0 %    Monocytes % 11.8 4.0 - 12.0 %    Eosinophils % 3.1 0.5 - 7.8 %    Basophils % 0.6 0.0 - 2.0 %    Immature Granulocytes % 0.4 0.0 - 5.0 %    Neutrophils Absolute 2.09 1.70 - 8.20 K/UL    Lymphocytes Absolute 1.99 0.50 - 4.60 K/UL    Monocytes Absolute 0.57 0.10 - 1.30 K/UL    Eosinophils Absolute 0.15 0.00 - 0.80 K/UL    Basophils Absolute 0.03 0.00 - 0.20 K/UL    Immature Granulocytes Absolute 0.02 0.0 - 0.5 K/UL       No results for input(s): \"COVID19\" in the last 72 hours.    Recent Vital Data:  Patient Vitals for the past 24 hrs:   Temp Pulse Resp BP SpO2   03/10/25 0725 98.2 °F (36.8 °C) 69 12 (!) 104/42 95 %   03/10/25 0250 98 °F (36.7 °C) 77 16 (!) 159/73 96 %    03/09/25 2340 98.2 °F (36.8 °C) 71 18 (!) 104/53 94 %   03/09/25 1932 98.2 °F (36.8 °C) 65 20 (!) 145/62 95 %   03/09/25 1620 98.1 °F (36.7 °C) 75 16 128/74 96 %   03/09/25 1145 -- 81 -- 121/70 96 %   03/09/25 1144 -- 73 -- (!) 143/68 96 %   03/09/25 1142 97.9 °F (36.6 °C) 65 18 (!) 173/63 95 %   03/09/25 0745 97.5 °F (36.4 °C) 66 14 (!) 149/66 97 %       Oxygen Therapy  SpO2: 95 %  O2 Device: None (Room air)    Estimated body mass index is 20.78 kg/m² as calculated from the following:    Height as of this encounter: 1.549 m (5' 1\").    Weight as of this encounter: 49.9 kg (110 lb).  No intake or output data in the 24 hours ending 03/10/25 0741      Physical Exam:    General:    Well nourished.  No overt distress  Head:  Normocephalic, atraumatic  Eyes:  Sclerae appear normal.  Pupils equally round.    HENT:  Nares appear normal, no drainage.  Moist mucous membranes  Neck:  No restricted ROM.  Trachea midline  CV:   RRR.  No m/r/g.  No JVD  Lungs:   CTAB.  No wheezing, rhonchi, or rales.  Respirations even, unlabored  Abdomen:   Soft, nontender, nondistended.    Extremities: Warm and dry.   No edema.    Skin:     No rashes.  Normal coloration  Neuro:  CN II-XII grossly intact.  Psych:  Normal mood and affect.        Time spent in patient discharge and coordination 45 minutes.      Signed:  Xiao Meneses DO    Part of this note may have been written by using a voice dictation software.  The note has been proof read but may still contain some grammatical/other typographical errors.

## 2025-03-10 NOTE — PROGRESS NOTES
GOALS:  LTG: Patient will maintain adequate hydration/nutrition with optimum safety and efficiency of swallowing function with PO intake without overt signs or symptoms of aspiration for the highest appropriate diet level.  STG:  Patient will consume soft and bite-sized textures and thin liquids without overt signs or symptoms of airway compromise.PROGRESSING 3/10/25  Patient will safely ingest diet trials during therapeutic feedings with SLP without overt signs or symptoms of respiratory compromise in efforts to advance diet. NOT INDICATED 3/10/25  Patient will perform laryngeal/base of tongue exercises x10 each  with moderate cues with 80% accuracy to increase strength and coordination of pharyngeal/lingual musculature for bolus control/safer swallow function.  Patient will complete a Modified Barium Swallow study to fully assess physiology and anatomy of the swallow and determine safest appropriate diet and/or rehabilitation strategies, as medically indicated MET 3/10/25    SPEECH LANGUAGE PATHOLOGY: Modified Barium Swallow Study     Initial Assessment    Acknowledge Order  I  Therapy Time  I   Charges     I  Rehab Caseload Tracker  NAME: Brenda Nobles  : 1932  MRN: [id number]    ADMISSION DATE: 3/8/2025  PRIMARY DIAGNOSIS: Acute UTI    ICD-10: Treatment Diagnosis: R13.11 Dysphagia, Oral Phase    RECOMMENDATIONS   Diet:    Soft and Bite-Sized  Thin Liquids    Medication: as tolerated   Compensatory Swallowing Strategies:   Upright for all PO  Slow rate of PO intake  Small bites and sips  Remain upright for 20-30 min after any PO   Therapeutic Intervention:   Patient/family education  Dysphagia treatment   Patient continues to require skilled intervention:  Yes. Recommend ongoing speech therapy services during this hospitalization.     Anticipated Discharge Needs: Do not anticipate ongoing speech therapy needs upon discharge.      ASSESSMENT    Patient presents with mild oral dysphagia characterized by

## 2025-03-10 NOTE — PROGRESS NOTES
ACUTE PHYSICAL THERAPY GOALS:   (Developed with and agreed upon by patient and/or caregiver.)  DISCHARGE GOALS :  (1.)Ms. Nobles will move from supine to sit and sit to supine  with SUPERVISION   (2.)Ms. Nobles will transfer from bed to chair and chair to bed with STAND BY ASSIST using a walker/Rolator.  (3.)Ms. Nobles will ambulate with STAND BY ASSIST for 50-75 feet with a rolling walker/Rolator.  ________________________     PHYSICAL THERAPY Daily Note and AM  (Link to Caseload Tracking: PT Visit Days : 2  Acknowledge Orders  Time In/Out  PT Charge Capture  Rehab Caseload Tracker    Brenda Nobles is a 89 y.o. female   PRIMARY DIAGNOSIS: Acute UTI  Acute UTI [N39.0]  Acute cystitis without hematuria [N30.00]  Fall, initial encounter [W19.XXXA]       Reason for Referral: Generalized Muscle Weakness (M62.81)  Other lack of cordination (R27.8)  Difficulty in walking, Not elsewhere classified (R26.2)  Other abnormalities of gait and mobility (R26.89)  History of falling (Z91.81)  Inpatient: Payor: MEDICARE / Plan: MEDICARE PART A AND B / Product Type: *No Product type* /     ASSESSMENT:     REHAB RECOMMENDATIONS:   Recommendation to date pending progress:  Setting:  Home Health Therapy  Pt not safe currently to be home alone    Equipment:     Pt has Rollator     ASSESSMENT:  Ms. Nobles presented as alert & oriented x 4, followed cues & participated well with therapy activity noted. Pt was admitted with UTI resulting in weakness & a fall. Pt has weakness in left LE with drop foot which is likely contributing to her instability since it is not braced. This pt will benefit from follow up therapy to help restore safe function prior to returning home. Pt has a caregiver 8 hr a day 5 x a week but currently will not be safe to be home alone until therapy can get pt back to her functional baseline. PT will follow & work toward goals noted.  3/10/25: Up in recliner upon contact. Sitter present. Worked on sit to stand  Balance  Decreased Cognition  Decreased Coordination  Decreased Gait Ability  Decreased Safety Awareness  Decreased Strength  Decreased Transfer Abilities INTERVENTIONS PLANNED:   (Benefits and precautions of physical therapy have been discussed with the patient.)  Therapeutic Activity  Therapeutic Exercise/HEP  Neuromuscular Re-education  Gait Training  Education       TREATMENT:       TREATMENT:   Therapeutic Activity (15 Minutes): Therapeutic activity included Scooting, Transfer Training, Ambulation on level ground, Sitting balance , and Standing balance to improve functional Activity tolerance, Balance, Coordination, Mobility, Strength, and ROM.    TREATMENT GRID:  N/A    AFTER TREATMENT PRECAUTIONS: Bed/Chair Locked, Call light within reach, Chair, Needs within reach, RN notified, and sitter present    INTERDISCIPLINARY COLLABORATION:  RN/ PCT and PT/ PTA    EDUCATION:    Educated patient and/or family/caregiver on the following: Assistive device indications/utilization/safety, Fall prevention strategies, Home Safety, Precautions, and Positioning    TIME IN/OUT:  Time In: 1025  Time Out: 1040  Minutes: 15    DECLAN CUENCA, PT

## 2025-03-11 ENCOUNTER — OFFICE VISIT (OUTPATIENT)
Dept: UROLOGY | Age: 89
End: 2025-03-11
Payer: MEDICARE

## 2025-03-11 ENCOUNTER — CARE COORDINATION (OUTPATIENT)
Dept: CARE COORDINATION | Facility: CLINIC | Age: 89
End: 2025-03-11

## 2025-03-11 DIAGNOSIS — N39.41 URGE INCONTINENCE: ICD-10-CM

## 2025-03-11 DIAGNOSIS — N39.0 RECURRENT UTI: Primary | ICD-10-CM

## 2025-03-11 DIAGNOSIS — R31.29 MICROHEMATURIA: ICD-10-CM

## 2025-03-11 LAB
BILIRUBIN, URINE, POC: NORMAL
BLOOD URINE, POC: NEGATIVE
GLUCOSE URINE, POC: 100 MG/DL
KETONES, URINE, POC: NORMAL MG/DL
LEUKOCYTE ESTERASE, URINE, POC: NEGATIVE
NITRITE, URINE, POC: NEGATIVE
PH, URINE, POC: 5.5 (ref 4.6–8)
PROTEIN,URINE, POC: 100 MG/DL
SPECIFIC GRAVITY, URINE, POC: 1.02 (ref 1–1.03)
URINALYSIS CLARITY, POC: NORMAL
URINALYSIS COLOR, POC: NORMAL
UROBILINOGEN, POC: NORMAL MG/DL

## 2025-03-11 PROCEDURE — 1036F TOBACCO NON-USER: CPT | Performed by: UROLOGY

## 2025-03-11 PROCEDURE — G8420 CALC BMI NORM PARAMETERS: HCPCS | Performed by: UROLOGY

## 2025-03-11 PROCEDURE — 1111F DSCHRG MED/CURRENT MED MERGE: CPT | Performed by: UROLOGY

## 2025-03-11 PROCEDURE — 0509F URINE INCON PLAN DOCD: CPT | Performed by: UROLOGY

## 2025-03-11 PROCEDURE — 99213 OFFICE O/P EST LOW 20 MIN: CPT | Performed by: UROLOGY

## 2025-03-11 PROCEDURE — 1090F PRES/ABSN URINE INCON ASSESS: CPT | Performed by: UROLOGY

## 2025-03-11 PROCEDURE — G8427 DOCREV CUR MEDS BY ELIG CLIN: HCPCS | Performed by: UROLOGY

## 2025-03-11 PROCEDURE — 1123F ACP DISCUSS/DSCN MKR DOCD: CPT | Performed by: UROLOGY

## 2025-03-11 PROCEDURE — 1159F MED LIST DOCD IN RCRD: CPT | Performed by: UROLOGY

## 2025-03-11 PROCEDURE — 81003 URINALYSIS AUTO W/O SCOPE: CPT | Performed by: UROLOGY

## 2025-03-11 RX ORDER — VIBEGRON 75 MG/1
75 TABLET, FILM COATED ORAL DAILY
Qty: 30 TABLET | Refills: 11 | Status: SHIPPED | OUTPATIENT
Start: 2025-03-11

## 2025-03-11 NOTE — CARE COORDINATION
Care Transitions Note    Initial Call - Call within 2 business days of discharge: Yes    Attempted to reach patient for transitions of care follow up. Unable to reach patient.    Outreach Attempts:   HIPAA compliant voicemail left for patient.     Patient: Brenda Nobles  Patient : 1932   MRN: [id number]    Reason for Admission: Fall, UTI  Discharge Date: 3/10/25  RURS: Readmission Risk Score: 10.2    Last Discharge Facility       Date Complaint Diagnosis Description Type Department Provider    3/8/25 Hip Pain; Fall Fall, initial encounter ... ED to Hosp-Admission (Discharged) (ADMITTED) [id number] Meneses, Thu, DO; Francisco, Anuj,...            Was this an external facility discharge? No    Follow Up Appointment:   Patient has hospital follow up appointment scheduled within 7 days of discharge.    Future Appointments         Provider Specialty Dept Phone    3/11/2025 2:30 PM Hesham Walter MD Urology [phone]    3/12/2025 11:00 AM Shaina Cabrera MD Internal Medicine [phone]    10/28/2025 1:00 PM Michael Saldana Jr. PA Neurology [phone]            Plan for follow-up on next business day.      Jazmine Du RN

## 2025-03-11 NOTE — PROGRESS NOTES
[facility]  [address] [place] [address]  [phone]    Brenda Nobles  : 1932     HPI   89 y.o., female returns in follow up for OAB.    The patient was recently in the hospital, ostensibly with a UTI.  She did have an abnormal urinalysis but her urine culture grew out  K mixed skin ida.  She is feeling much better today.  She continues to take Gemtesa with excellent results.    Past Medical History:   Diagnosis Date    Acute CVA (cerebrovascular accident) ([facility]) 10/27/2023    Arthritis     Chest pain 2014    Dysphagia 2014    Edema 2014    Esophageal dysmotility 2014    Hypercholesterolemia     Hypertension 2014    Low back pain     Other ill-defined conditions(799.89)     high cholesterol    Shingles     Stroke ([facility])     Type 2 diabetes mellitus without complication, without long-term current use of insulin ([facility]) 2023     Past Surgical History:   Procedure Laterality Date    APPENDECTOMY      incidental    COLONOSCOPY  10/03    COLONOSCOPY N/A 2023    [place]OSCOPY DIAGNOSTIC [place] Olivares [place] [facility]    HYSTERECTOMY (CERVIX STATUS UNKNOWN)      HYSTERECTOMY, TOTAL ABDOMINAL (CERVIX REMOVED)      UPPER GASTROINTESTINAL ENDOSCOPY N/A 2023    EGD BIOPSY performed [place] Shady Olivares [place] [facility]     Current Outpatient Medications   Medication Sig Dispense Refill    vitamin B-12 (CYANOCOBALAMIN) 1000 MCG tablet Take 1 tablet by mouth daily 90 tablet 3    vibegron (GEMTESA) 75 MG TABS tablet Take 1 tablet by mouth daily 30 tablet 5    pravastatin (PRAVACHOL) 20 MG tablet Take 1 tablet by mouth daily 90 tablet 3    metFORMIN (GLUCOPHAGE) 500 MG tablet TAKE ONE TABLET BY MOUTH TWICE A  tablet 3    Evolocumab (REPATHA) SOSY syringe Inject 3 mLs into the skin every 30 days 3 mL 11    lisinopril (PRINIVIL;ZESTRIL) 20 MG tablet TAKE ONE TABLET BY MOUTH ONE TIME DAILY 90 tablet 3    omeprazole

## 2025-03-12 ENCOUNTER — OFFICE VISIT (OUTPATIENT)
Dept: INTERNAL MEDICINE CLINIC | Facility: CLINIC | Age: 89
End: 2025-03-12
Payer: MEDICARE

## 2025-03-12 ENCOUNTER — CARE COORDINATION (OUTPATIENT)
Dept: CARE COORDINATION | Facility: CLINIC | Age: 89
End: 2025-03-12

## 2025-03-12 VITALS
DIASTOLIC BLOOD PRESSURE: 60 MMHG | SYSTOLIC BLOOD PRESSURE: 122 MMHG | HEIGHT: 61 IN | WEIGHT: 108 LBS | HEART RATE: 60 BPM | BODY MASS INDEX: 20.39 KG/M2

## 2025-03-12 DIAGNOSIS — N39.0 RECURRENT UTI: Primary | ICD-10-CM

## 2025-03-12 DIAGNOSIS — K21.9 GASTROESOPHAGEAL REFLUX DISEASE WITHOUT ESOPHAGITIS: ICD-10-CM

## 2025-03-12 DIAGNOSIS — E78.01 FAMILIAL HYPERCHOLESTEREMIA: ICD-10-CM
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
## 2025-03-12 DIAGNOSIS — E11.9 TYPE 2 DIABETES MELLITUS WITHOUT COMPLICATION, WITHOUT LONG-TERM CURRENT USE OF INSULIN (HCC): ICD-10-CM

## 2025-03-12 DIAGNOSIS — I10 PRIMARY HYPERTENSION: ICD-10-CM

## 2025-03-12 DIAGNOSIS — M81.0 AGE-RELATED OSTEOPOROSIS WITHOUT CURRENT PATHOLOGICAL FRACTURE: ICD-10-CM

## 2025-03-12 DIAGNOSIS — E11.65 TYPE 2 DIABETES MELLITUS WITH HYPERGLYCEMIA, WITHOUT LONG-TERM CURRENT USE OF INSULIN (HCC): ICD-10-CM

## 2025-03-12 PROCEDURE — G8420 CALC BMI NORM PARAMETERS: HCPCS | Performed by: INTERNAL MEDICINE

## 2025-03-12 PROCEDURE — 1090F PRES/ABSN URINE INCON ASSESS: CPT | Performed by: INTERNAL MEDICINE

## 2025-03-12 PROCEDURE — 1159F MED LIST DOCD IN RCRD: CPT | Performed by: INTERNAL MEDICINE

## 2025-03-12 PROCEDURE — G8427 DOCREV CUR MEDS BY ELIG CLIN: HCPCS | Performed by: INTERNAL MEDICINE

## 2025-03-12 PROCEDURE — 1036F TOBACCO NON-USER: CPT | Performed by: INTERNAL MEDICINE

## 2025-03-12 PROCEDURE — 1111F DSCHRG MED/CURRENT MED MERGE: CPT | Performed by: INTERNAL MEDICINE

## 2025-03-12 PROCEDURE — 3044F HG A1C LEVEL LT 7.0%: CPT | Performed by: INTERNAL MEDICINE

## 2025-03-12 PROCEDURE — 1123F ACP DISCUSS/DSCN MKR DOCD: CPT | Performed by: INTERNAL MEDICINE

## 2025-03-12 PROCEDURE — 99213 OFFICE O/P EST LOW 20 MIN: CPT | Performed by: INTERNAL MEDICINE

## 2025-03-12 RX ORDER — OMEPRAZOLE 20 MG/1
20 CAPSULE, DELAYED RELEASE ORAL DAILY
Qty: 90 CAPSULE | Refills: 3 | Status: SHIPPED | OUTPATIENT
Start: 2025-03-12

## 2025-03-12 RX ORDER — PRAVASTATIN SODIUM 20 MG
20 TABLET ORAL DAILY
Qty: 90 TABLET | Refills: 3 | Status: SHIPPED | OUTPATIENT
Start: 2025-03-12

## 2025-03-12 RX ORDER — LISINOPRIL 20 MG/1
20 TABLET ORAL DAILY
Qty: 90 TABLET | Refills: 3 | Status: SHIPPED | OUTPATIENT
Start: 2025-03-12

## 2025-03-12 ASSESSMENT — PATIENT HEALTH QUESTIONNAIRE - PHQ9
SUM OF ALL RESPONSES TO PHQ QUESTIONS 1-9: 0
1. LITTLE INTEREST OR PLEASURE IN DOING THINGS: NOT AT ALL
SUM OF ALL RESPONSES TO PHQ QUESTIONS 1-9: 0
SUM OF ALL RESPONSES TO PHQ QUESTIONS 1-9: 0
2. FEELING DOWN, DEPRESSED OR HOPELESS: NOT AT ALL
SUM OF ALL RESPONSES TO PHQ QUESTIONS 1-9: 0

## 2025-03-12 NOTE — PROGRESS NOTES
reviewed with the patient today.     Recent Results (from the past 4 weeks)   Culture, Urine    Collection Time: 03/08/25 10:37 AM    Specimen: Urine, clean catch   Result Value Ref Range    Special Requests NO SPECIAL REQUESTS      Culture        50,000-100,000 COLONIES/mL MIXED SKIN DILAN ISOLATED   POCT Urinalysis no Micro    Collection Time: 03/08/25 11:34 AM   Result Value Ref Range    Specific Gravity, Urine, POC 1.020 1.001 - 1.023      pH, Urine, POC 7.5 5.0 - 9.0      Protein, Urine, POC 30 (A) NEG mg/dL    Glucose, UA POC Negative NEG mg/dL    Ketones, Urine, POC Negative NEG mg/dL    Bilirubin, Urine, POC Negative NEG      Blood, UA POC Negative NEG      URINE UROBILINOGEN POC 0.2 0.2 - 1.0 EU/dL    Nitrite, Urine, POC Negative NEG      Leukocyte Est, UA POC TRACE (A) NEG      Performed by: Meche Lemos    Urinalysis, Micro    Collection Time: 03/08/25 11:37 AM   Result Value Ref Range    WBC, UA 10-20 (A) U4 /hpf    RBC, UA 0-5 U5 /hpf    Epithelial Cells, UA 0-5 U5 /hpf    BACTERIA, URINE 2+ (A) NEG /hpf    Hyaline Casts, UA 0-2 /lpf   EKG 12 Lead    Collection Time: 03/08/25 12:01 PM   Result Value Ref Range    Ventricular Rate 61 BPM    Atrial Rate 61 BPM    P-R Interval 168 ms    QRS Duration 112 ms    Q-T Interval 406 ms    QTc Calculation (Bazett) 408 ms    P Axis 77 degrees    R Axis -29 degrees    T Axis -2 degrees    Diagnosis       Normal sinus rhythm  Poor R wave progression  Abnormal ECG  When compared with ECG of 11-MAY-2024 23:31,  PREVIOUS ECG IS PRESENT  Confirmed by Kalyan Burgos MD ([id number]) on 3/8/2025 5:27:57 PM     CBC with Auto Differential    Collection Time: 03/08/25 12:13 PM   Result Value Ref Range    WBC 5.7 4.3 - 11.1 K/uL    RBC 4.47 4.05 - 5.2 M/uL    Hemoglobin 13.6 11.7 - 15.4 g/dL    Hematocrit 40.1 35.8 - 46.3 %    MCV 89.7 82.0 - 102.0 FL    MCH 30.4 26.1 - 32.9 PG    MCHC 33.9 31.4 - 35.0 g/dL    RDW 13.1 11.9 - 14.6 %    Platelets 263 150 - 450 K/uL    MPV 10.0

## 2025-03-12 NOTE — CARE COORDINATION
Care Transitions Note    Initial Call - Call within 2 business days of discharge: Yes    Patient Current Location:  Home: [address] [place] [phone]    Care Transition Nurse contacted the patient by telephone to perform post hospital discharge assessment, verified name and  as identifiers. Provided introduction to self, and explanation of the Care Transition Nurse role.   Patient is not interested.   Patient has upcoming follow up already scheduled per chart review.   Patient: Brenda Nobles  Patient : 1932   MRN: [id number]    Reason for Admission: Acute UTI  Discharge Date: 3/10/25  RURS: Readmission Risk Score: 10.2      Last Discharge Facility       Date Complaint Diagnosis Description Type Department Provider    3/8/25 Hip Pain; Fall Fall, initial encounter ... ED to Hosp-Admission (Discharged) (ADMITTED) [id number] Xiao Meneses, DO; Anuj Singh,...            Was this an external facility discharge? No  Follow Up Appointment:   Discussed follow up appointments. Patient has hospital follow up appointment scheduled within 7 days of discharge.   Future Appointments         Provider Specialty Dept Phone    3/12/2025 11:00 AM Shaina Cabrera MD Internal Medicine [phone]    10/28/2025 1:00 PM Michael Saldana Jr., PA Neurology [phone]    3/17/2026 2:30 PM Hesham Walter MD Urology [phone]        Jazmine Du RN

## 2025-03-13 LAB
BACTERIA SPEC CULT: NORMAL
SERVICE CMNT-IMP: NORMAL

## 2025-03-18 NOTE — PROGRESS NOTES
Physician Progress Note      PATIENT:               CHRISTY RIVERA  CSN #:                  [id number]  :                       1932  ADMIT DATE:       3/8/2025 11:20 AM  DISCH DATE:        3/10/2025 1:57 PM  RESPONDING  PROVIDER #:        Xiao Meneses DO          QUERY TEXT:    Patient admitted s/p fall at home. Noted documentation of acute cystitis in   H&P. In order to support the diagnosis of acute cystitis, please include   additional clinical indicators in your documentation.  Or please document if   the diagnosis of acute cystitis has been ruled out after further study.    The medical record reflects the following:  Risk Factors: age 89, unknown downtime s/p fall at home  Clinical Indicators: Per H&P, \"In the ED, VSS.  UA concerning for possible   UTI... assessment & plan: acute UTI, abx: rocephin (3/8-diarrhea).\" UA w/   micro:  SG 1.020, pH 7.5, protein 30, negative glucose, ketones, bili, blood,   and nitrite, trace leuks, micro: wbc 10-20, rbc 0-5, epithelial 0-5, bacteria   2+. Urine culture 3/8: \"50,000-100,000 COLONIES/mL MIXED SKIN DILAN ISOLATED\"   per labs.  Treatment: IV Rocephin  Options provided:  -- Acute cystitis present as evidenced by, Please document evidence.  -- Acute cystitis was ruled out  -- Other - I will add my own diagnosis  -- Disagree - Not applicable / Not valid  -- Disagree - Clinically unable to determine / Unknown  -- Refer to Clinical Documentation Reviewer    PROVIDER RESPONSE TEXT:    Acute cystitis was ruled out after study.    Query created by: Natali Branham on 3/14/2025 9:48 AM      Electronically signed by:  Xiao Meneses DO 3/18/2025 7:40 AM

## 2025-03-19 ENCOUNTER — HOSPITAL ENCOUNTER (EMERGENCY)
Age: 89
Discharge: HOME | End: 2025-03-19
Payer: MEDICARE

## 2025-03-19 ENCOUNTER — APPOINTMENT (OUTPATIENT)
Dept: GENERAL RADIOLOGY | Age: 89
End: 2025-03-19
Payer: MEDICARE

## 2025-03-19 VITALS
DIASTOLIC BLOOD PRESSURE: 74 MMHG | HEART RATE: 103 BPM | OXYGEN SATURATION: 98 % | TEMPERATURE: 98.2 F | RESPIRATION RATE: 16 BRPM | SYSTOLIC BLOOD PRESSURE: 173 MMHG

## 2025-03-19 DIAGNOSIS — M53.3 COCCYX PAIN: ICD-10-CM
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
## 2025-03-19 LAB
ALBUMIN SERPL-MCNC: 3.7 G/DL (ref 3.2–4.6)
ALBUMIN/GLOB SERPL: 1.2 (ref 1–1.9)
ALP SERPL-CCNC: 68 U/L (ref 35–104)
ALT SERPL-CCNC: 15 U/L (ref 8–45)
ANION GAP SERPL CALC-SCNC: 11 MMOL/L (ref 7–16)
APPEARANCE UR: CLEAR
AST SERPL-CCNC: 23 U/L (ref 15–37)
BACTERIA URNS QL MICRO: NEGATIVE /HPF
BASOPHILS # BLD: 0.03 K/UL (ref 0–0.2)
BASOPHILS NFR BLD: 0.3 % (ref 0–2)
BILIRUB SERPL-MCNC: 0.4 MG/DL (ref 0–1.2)
BILIRUB UR QL: NEGATIVE
BUN SERPL-MCNC: 12 MG/DL (ref 8–23)
CALCIUM SERPL-MCNC: 9.9 MG/DL (ref 8.8–10.2)
CHLORIDE SERPL-SCNC: 102 MMOL/L (ref 98–107)
CK SERPL-CCNC: 95 U/L (ref 21–215)
CO2 SERPL-SCNC: 28 MMOL/L (ref 20–29)
COLOR UR: ABNORMAL
CREAT SERPL-MCNC: 0.74 MG/DL (ref 0.6–1.1)
DIFFERENTIAL METHOD BLD: ABNORMAL
EOSINOPHIL # BLD: 0.03 K/UL (ref 0–0.8)
EOSINOPHIL NFR BLD: 0.3 % (ref 0.5–7.8)
EPI CELLS #/AREA URNS HPF: ABNORMAL /HPF
ERYTHROCYTE [DISTWIDTH] IN BLOOD BY AUTOMATED COUNT: 13 % (ref 11.9–14.6)
GLOBULIN SER CALC-MCNC: 3 G/DL (ref 2.3–3.5)
GLUCOSE SERPL-MCNC: 123 MG/DL (ref 70–99)
GLUCOSE UR STRIP.AUTO-MCNC: NEGATIVE MG/DL
HCT VFR BLD AUTO: 40 % (ref 35.8–46.3)
HGB BLD-MCNC: 13.6 G/DL (ref 11.7–15.4)
HGB UR QL STRIP: NEGATIVE
HYALINE CASTS URNS QL MICRO: ABNORMAL /LPF
IMM GRANULOCYTES # BLD AUTO: 0.02 K/UL (ref 0–0.5)
IMM GRANULOCYTES NFR BLD AUTO: 0.2 % (ref 0–5)
KETONES UR QL STRIP.AUTO: NEGATIVE MG/DL
LEUKOCYTE ESTERASE UR QL STRIP.AUTO: NEGATIVE
LYMPHOCYTES # BLD: 1.15 K/UL (ref 0.5–4.6)
LYMPHOCYTES NFR BLD: 11 % (ref 13–44)
MCH RBC QN AUTO: 30.5 PG (ref 26.1–32.9)
MCHC RBC AUTO-ENTMCNC: 34 G/DL (ref 31.4–35)
MCV RBC AUTO: 89.7 FL (ref 82–102)
MONOCYTES # BLD: 0.83 K/UL (ref 0.1–1.3)
MONOCYTES NFR BLD: 8 % (ref 4–12)
NEUTS SEG # BLD: 8.35 K/UL (ref 1.7–8.2)
NEUTS SEG NFR BLD: 80.2 % (ref 43–78)
NITRITE UR QL STRIP.AUTO: NEGATIVE
NRBC # BLD: 0 K/UL (ref 0–0.2)
PH UR STRIP: 7.5 (ref 5–9)
PLATELET # BLD AUTO: 265 K/UL (ref 150–450)
PMV BLD AUTO: 10.4 FL (ref 9.4–12.3)
POTASSIUM SERPL-SCNC: 3.9 MMOL/L (ref 3.5–5.1)
PROT SERPL-MCNC: 6.7 G/DL (ref 6.3–8.2)
PROT UR STRIP-MCNC: ABNORMAL MG/DL
RBC # BLD AUTO: 4.46 M/UL (ref 4.05–5.2)
RBC #/AREA URNS HPF: ABNORMAL /HPF
SODIUM SERPL-SCNC: 141 MMOL/L (ref 136–145)
SP GR UR REFRACTOMETRY: 1.01 (ref 1–1.02)
UROBILINOGEN UR QL STRIP.AUTO: 0.2 EU/DL (ref 0.2–1)
WBC # BLD AUTO: 10.4 K/UL (ref 4.3–11.1)
WBC URNS QL MICRO: ABNORMAL /HPF

## 2025-03-19 PROCEDURE — 81001 URINALYSIS AUTO W/SCOPE: CPT

## 2025-03-19 PROCEDURE — 80053 COMPREHEN METABOLIC PANEL: CPT

## 2025-03-19 PROCEDURE — 85025 COMPLETE CBC W/AUTO DIFF WBC: CPT

## 2025-03-19 PROCEDURE — 72220 X-RAY EXAM SACRUM TAILBONE: CPT

## 2025-03-19 PROCEDURE — 82550 ASSAY OF CK (CPK): CPT

## 2025-03-19 PROCEDURE — 99284 EMERGENCY DEPT VISIT MOD MDM: CPT

## 2025-03-19 ASSESSMENT — PAIN SCALES - GENERAL: PAINLEVEL_OUTOF10: 0

## 2025-03-19 ASSESSMENT — ENCOUNTER SYMPTOMS
ABDOMINAL PAIN: 0
SHORTNESS OF BREATH: 0

## 2025-03-19 ASSESSMENT — PAIN - FUNCTIONAL ASSESSMENT: PAIN_FUNCTIONAL_ASSESSMENT: 0-10

## 2025-03-19 NOTE — DISCHARGE INSTRUCTIONS
As we discussed, your workup in the emergency department today is reassuring.  Your x-ray shows no fractures.  Your labs are normal.  Follow-up with your primary care provider for recheck of your symptoms.  Take Tylenol if needed for pain.  Return to the emergency department if you develop any new, worsening, or concerning symptoms.

## 2025-03-19 NOTE — ED NOTES
Patient mobility status  with difficulty, uses a walker.     I have reviewed discharge instructions with the patient.  The patient verbalized understanding.    Patient left ED via Discharge Method: wheelchair to Home with  caregiver, Preeti .    Opportunity for questions and clarification provided.     Patient given 0 scripts.

## 2025-03-19 NOTE — ED TRIAGE NOTES
Pt woke up around 2AM on the floor.  Pt does not remember fall but believes she rolled out of her bed. Pt only complaint is low back/buttock pain but states this is better after getting up off the floor.  Pt Aox4.
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
## 2025-04-16 ENCOUNTER — TELEPHONE (OUTPATIENT)
Dept: UROLOGY | Age: 89
End: 2025-04-16

## 2025-04-17 ENCOUNTER — TELEPHONE (OUTPATIENT)
Dept: UROLOGY | Age: 89
End: 2025-04-17

## 2025-04-17 NOTE — TELEPHONE ENCOUNTER
Judy SALAZARMOHIRAL stating that she believes the Pt may have a UTI due to the dysuria and fatigue she has been feeling and wanted to know if an abx could be called in. Called Judy to inform her that we would need to check the urine for infection before advising. Informed her that Dr Walter is OOO, but I can schedule a specimen check with Fabi Alex tomorrow morning for her to bring the Pt's specimen to the office. Scheduled for Fri Apr 18 at 8:00 AM. Judy states it is hard to get Pt out of the house at the moment with her weakness. Appt confirmed with Judy.

## 2025-04-18 ENCOUNTER — CLINICAL SUPPORT (OUTPATIENT)
Dept: UROLOGY | Age: 89
End: 2025-04-18
Payer: MEDICARE

## 2025-04-18 ENCOUNTER — TELEPHONE (OUTPATIENT)
Dept: UROLOGY | Age: 89
End: 2025-04-18

## 2025-04-18 DIAGNOSIS — N39.0 RECURRENT UTI: ICD-10-CM

## 2025-04-18 DIAGNOSIS — N39.41 URGE INCONTINENCE: Primary | ICD-10-CM

## 2025-04-18 DIAGNOSIS — R32 URINARY INCONTINENCE, UNSPECIFIED TYPE: ICD-10-CM

## 2025-04-18 LAB
BILIRUBIN, URINE, POC: NEGATIVE
BLOOD URINE, POC: NEGATIVE
GLUCOSE URINE, POC: NEGATIVE MG/DL
KETONES, URINE, POC: NEGATIVE MG/DL
LEUKOCYTE ESTERASE, URINE, POC: NEGATIVE
NITRITE, URINE, POC: NEGATIVE
PH, URINE, POC: 5.5 (ref 4.6–8)
PROTEIN,URINE, POC: NEGATIVE MG/DL
SPECIFIC GRAVITY, URINE, POC: 1.02 (ref 1–1.03)
URINALYSIS CLARITY, POC: NORMAL
URINALYSIS COLOR, POC: NORMAL
UROBILINOGEN, POC: NORMAL MG/DL

## 2025-04-18 PROCEDURE — 81003 URINALYSIS AUTO W/O SCOPE: CPT | Performed by: NURSE PRACTITIONER

## 2025-04-18 NOTE — TELEPHONE ENCOUNTER
Ldvm/[facility]  I tried to call Judy and the other number and was not able to leave message  Fabi have looked over your results and they read as follows:   Urine is normal

## 2025-04-18 NOTE — PROGRESS NOTES
SX dysuria  Results for orders placed or performed in visit on 04/18/25   AMB POC URINALYSIS DIP STICK AUTO W/O MICRO   Result Value Ref Range    Color (UA POC)      Clarity (UA POC)      Glucose, Urine, POC Negative Negative mg/dL    Bilirubin, Urine, POC Negative Negative    KETONES, Urine, POC Negative Negative mg/dL    Specific Gravity, Urine, POC 1.020 1.001 - 1.035    Blood (UA POC) Negative     pH, Urine, POC 5.5 4.6 - 8.0    Protein, Urine, POC Negative Negative mg/dL    Urobilinogen, POC 0.2 mg/dL <1.1 mg/dL    Nitrite, Urine, POC Negative Negative    Leukocyte Esterase, Urine, POC Negative Negative     Urine normal.   Fabi Alex, APRN - CNP

## 2025-05-01 ENCOUNTER — TELEPHONE (OUTPATIENT)
Dept: NEUROLOGY | Age: 89
End: 2025-05-01

## 2025-05-13 ENCOUNTER — APPOINTMENT (OUTPATIENT)
Dept: CT IMAGING | Age: 89
End: 2025-05-13
Payer: MEDICARE

## 2025-05-13 ENCOUNTER — HOSPITAL ENCOUNTER (EMERGENCY)
Age: 89
Discharge: HOME | End: 2025-05-13
Attending: EMERGENCY MEDICINE
Payer: MEDICARE

## 2025-05-13 ENCOUNTER — APPOINTMENT (OUTPATIENT)
Dept: MRI IMAGING | Age: 89
End: 2025-05-13
Payer: MEDICARE

## 2025-05-13 VITALS
RESPIRATION RATE: 17 BRPM | BODY MASS INDEX: 22.08 KG/M2 | TEMPERATURE: 98.5 F | OXYGEN SATURATION: 96 % | WEIGHT: 120 LBS | DIASTOLIC BLOOD PRESSURE: 93 MMHG | HEIGHT: 62 IN | SYSTOLIC BLOOD PRESSURE: 161 MMHG | HEART RATE: 61 BPM

## 2025-05-13 DIAGNOSIS — R42 VERTIGO: ICD-10-CM

## 2025-05-13 DIAGNOSIS — R42 DIZZINESS: Primary | ICD-10-CM

## 2025-05-13 LAB
ALBUMIN SERPL-MCNC: 3.6 G/DL (ref 3.2–4.6)
ALBUMIN/GLOB SERPL: 1.2 (ref 1–1.9)
ALP SERPL-CCNC: 70 U/L (ref 35–104)
ALT SERPL-CCNC: 14 U/L (ref 8–45)
ANION GAP SERPL CALC-SCNC: 12 MMOL/L (ref 7–16)
APPEARANCE UR: CLEAR
AST SERPL-CCNC: 19 U/L (ref 15–37)
BACTERIA URNS QL MICRO: NEGATIVE /HPF
BASOPHILS # BLD: 0.03 K/UL (ref 0–0.2)
BASOPHILS NFR BLD: 0.5 % (ref 0–2)
BILIRUB SERPL-MCNC: 0.3 MG/DL (ref 0–1.2)
BILIRUB UR QL: NEGATIVE
BUN SERPL-MCNC: 14 MG/DL (ref 8–23)
CALCIUM SERPL-MCNC: 9.6 MG/DL (ref 8.8–10.2)
CHLORIDE SERPL-SCNC: 100 MMOL/L (ref 98–107)
CO2 SERPL-SCNC: 26 MMOL/L (ref 20–29)
COLOR UR: ABNORMAL
CREAT SERPL-MCNC: 0.7 MG/DL (ref 0.6–1.1)
DIFFERENTIAL METHOD BLD: NORMAL
EKG ATRIAL RATE: 71 BPM
EKG DIAGNOSIS: NORMAL
EKG P AXIS: 72 DEGREES
EKG P-R INTERVAL: 181 MS
EKG Q-T INTERVAL: 394 MS
EKG QRS DURATION: 128 MS
EKG QTC CALCULATION (BAZETT): 432 MS
EKG R AXIS: -7 DEGREES
EKG T AXIS: 77 DEGREES
EKG VENTRICULAR RATE: 72 BPM
EOSINOPHIL # BLD: 0.12 K/UL (ref 0–0.8)
EOSINOPHIL NFR BLD: 2.1 % (ref 0.5–7.8)
EPI CELLS #/AREA URNS HPF: ABNORMAL /HPF
ERYTHROCYTE [DISTWIDTH] IN BLOOD BY AUTOMATED COUNT: 13 % (ref 11.9–14.6)
GLOBULIN SER CALC-MCNC: 2.9 G/DL (ref 2.3–3.5)
GLUCOSE BLD STRIP.AUTO-MCNC: 107 MG/DL (ref 65–100)
GLUCOSE SERPL-MCNC: 106 MG/DL (ref 70–99)
GLUCOSE UR STRIP.AUTO-MCNC: NEGATIVE MG/DL
HCT VFR BLD AUTO: 39.2 % (ref 35.8–46.3)
HGB BLD-MCNC: 12.9 G/DL (ref 11.7–15.4)
HGB UR QL STRIP: ABNORMAL
HYALINE CASTS URNS QL MICRO: ABNORMAL /LPF
IMM GRANULOCYTES # BLD AUTO: 0.02 K/UL (ref 0–0.5)
IMM GRANULOCYTES NFR BLD AUTO: 0.4 % (ref 0–5)
INR BLD: 1 (ref 0.9–1.2)
INR PPP: 0.9
KETONES UR QL STRIP.AUTO: NEGATIVE MG/DL
LEUKOCYTE ESTERASE UR QL STRIP.AUTO: NEGATIVE
LYMPHOCYTES # BLD: 2.09 K/UL (ref 0.5–4.6)
LYMPHOCYTES NFR BLD: 36.6 % (ref 13–44)
MCH RBC QN AUTO: 30.5 PG (ref 26.1–32.9)
MCHC RBC AUTO-ENTMCNC: 32.9 G/DL (ref 31.4–35)
MCV RBC AUTO: 92.7 FL (ref 82–102)
MONOCYTES # BLD: 0.53 K/UL (ref 0.1–1.3)
MONOCYTES NFR BLD: 9.3 % (ref 4–12)
NEUTS SEG # BLD: 2.92 K/UL (ref 1.7–8.2)
NEUTS SEG NFR BLD: 51.1 % (ref 43–78)
NITRITE UR QL STRIP.AUTO: NEGATIVE
NRBC # BLD: 0 K/UL (ref 0–0.2)
PH UR STRIP: 8 (ref 5–9)
PLATELET # BLD AUTO: 242 K/UL (ref 150–450)
PMV BLD AUTO: 10 FL (ref 9.4–12.3)
POTASSIUM SERPL-SCNC: 4.4 MMOL/L (ref 3.5–5.1)
PROT SERPL-MCNC: 6.5 G/DL (ref 6.3–8.2)
PROT UR STRIP-MCNC: NEGATIVE MG/DL
PROTHROMBIN TIME: 12.7 SEC (ref 11.3–14.9)
PT BLD: 10.5 SECS (ref 9.6–11.6)
RBC # BLD AUTO: 4.23 M/UL (ref 4.05–5.2)
RBC #/AREA URNS HPF: ABNORMAL /HPF
SERVICE CMNT-IMP: ABNORMAL
SODIUM SERPL-SCNC: 138 MMOL/L (ref 136–145)
SP GR UR REFRACTOMETRY: 1.03 (ref 1–1.02)
UROBILINOGEN UR QL STRIP.AUTO: 0.2 EU/DL (ref 0.2–1)
WBC # BLD AUTO: 5.7 K/UL (ref 4.3–11.1)
WBC URNS QL MICRO: ABNORMAL /HPF

## 2025-05-13 PROCEDURE — 80053 COMPREHEN METABOLIC PANEL: CPT

## 2025-05-13 PROCEDURE — 70496 CT ANGIOGRAPHY HEAD: CPT

## 2025-05-13 PROCEDURE — 93010 ELECTROCARDIOGRAM REPORT: CPT | Performed by: INTERNAL MEDICINE

## 2025-05-13 PROCEDURE — 6370000000 HC RX 637 (ALT 250 FOR IP): Performed by: EMERGENCY MEDICINE

## 2025-05-13 PROCEDURE — 99285 EMERGENCY DEPT VISIT HI MDM: CPT

## 2025-05-13 PROCEDURE — 85610 PROTHROMBIN TIME: CPT

## 2025-05-13 PROCEDURE — 85025 COMPLETE CBC W/AUTO DIFF WBC: CPT

## 2025-05-13 PROCEDURE — 93005 ELECTROCARDIOGRAM TRACING: CPT | Performed by: EMERGENCY MEDICINE

## 2025-05-13 PROCEDURE — 82962 GLUCOSE BLOOD TEST: CPT

## 2025-05-13 PROCEDURE — 70450 CT HEAD/BRAIN W/O DYE: CPT

## 2025-05-13 PROCEDURE — 81001 URINALYSIS AUTO W/SCOPE: CPT

## 2025-05-13 PROCEDURE — 70551 MRI BRAIN STEM W/O DYE: CPT

## 2025-05-13 PROCEDURE — 6360000004 HC RX CONTRAST MEDICATION: Performed by: EMERGENCY MEDICINE

## 2025-05-13 PROCEDURE — 99221 1ST HOSP IP/OBS SF/LOW 40: CPT | Performed by: STUDENT IN AN ORGANIZED HEALTH CARE EDUCATION/TRAINING PROGRAM

## 2025-05-13 PROCEDURE — 6370000000 HC RX 637 (ALT 250 FOR IP): Performed by: STUDENT IN AN ORGANIZED HEALTH CARE EDUCATION/TRAINING PROGRAM

## 2025-05-13 RX ORDER — MECLIZINE HYDROCHLORIDE 25 MG/1
25 TABLET ORAL 3 TIMES DAILY PRN
Qty: 20 TABLET | Refills: 0 | Status: SHIPPED | OUTPATIENT
Start: 2025-05-13 | End: 2025-05-23

## 2025-05-13 RX ORDER — MECLIZINE HYDROCHLORIDE 25 MG/1
25 TABLET ORAL
Status: COMPLETED | OUTPATIENT
Start: 2025-05-13 | End: 2025-05-13

## 2025-05-13 RX ORDER — ASPIRIN 81 MG/1
324 TABLET, CHEWABLE ORAL ONCE
Status: COMPLETED | OUTPATIENT
Start: 2025-05-13 | End: 2025-05-13

## 2025-05-13 RX ORDER — CLOPIDOGREL BISULFATE 75 MG/1
300 TABLET ORAL ONCE
Status: COMPLETED | OUTPATIENT
Start: 2025-05-13 | End: 2025-05-13

## 2025-05-13 RX ORDER — IOPAMIDOL 755 MG/ML
100 INJECTION, SOLUTION INTRAVASCULAR
Status: COMPLETED | OUTPATIENT
Start: 2025-05-13 | End: 2025-05-13

## 2025-05-13 RX ADMIN — IOPAMIDOL 100 ML: 755 INJECTION, SOLUTION INTRAVENOUS at 16:24

## 2025-05-13 RX ADMIN — ASPIRIN 324 MG: 81 TABLET, CHEWABLE ORAL at 16:57

## 2025-05-13 RX ADMIN — CLOPIDOGREL BISULFATE 300 MG: 75 TABLET, FILM COATED ORAL at 16:57

## 2025-05-13 RX ADMIN — MECLIZINE HYDROCHLORIDE 25 MG: 25 TABLET ORAL at 17:49

## 2025-05-13 ASSESSMENT — PAIN - FUNCTIONAL ASSESSMENT
PAIN_FUNCTIONAL_ASSESSMENT: NONE - DENIES PAIN
PAIN_FUNCTIONAL_ASSESSMENT: NONE - DENIES PAIN

## 2025-05-13 NOTE — CONSULTS
Neurology Consult Note       History:   89-year-old female with prior stroke and left leg weakness, hypertension, diabetes who is being seen for code S for dizziness that started around 2 PM.  No other reported symptoms. The patient presented to [facility] [place] ED.  A code S was called at 4:13 PM. Neurology arrived to the bedside at 4:18 PM. Initial NIHSS was 3.     Exam: Pertinent positives and negatives include:  NIHSS Score: 3  1a-Level of Consciousness 0  1b-What is Month/Age 0  1c-Open/Close Eyes&Hand 0  2 -Best Gaze 0  3 -Visual Fields 0  4 -Facial Palsy 0  5a-Motor-Left Arm 0  5b-Motor-Right Arm 0  6a-Motor-Left Leg 3  6b-Motor-Right Leg 0  7 -Limb Ataxia 0  8 -Sensory 0  9 -Best Language 0  10-Dysarthria 0  11-Extinction/Inattention 0    Imaging and review of data:   CT head without acute pathology.  CT angiogram pending      Assessment:     89-year-old female seen as a code S with acute onset of dizziness. Initial NIHSS was 3 but for chronic left leg weakness. The patient was not a candidate for tenecteplase because of mild symptoms . The patient was not a candidate for mechanical thrombectomy because of low NIH stroke scale    Plan:     MRI brain to follow CT scans to rule out acute ischemic stroke.  Aspirin, Plavix load in the meantime. Further recommendations to follow.       Yogi Miller, DO  Neurology      I spent 35 minutes today with the patient, which included chart review, obtaining history from the patient/family/other providers, physical exam, documentation, and reviewing pertinent testing.

## 2025-05-13 NOTE — ED PROVIDER NOTES
Emergency Department Provider Note       [facility] [place]   PCP: Love Perera MD   Age: 89 y.o.   Sex: female     DISPOSITION Decision To Discharge 05/13/2025 08:57:18 PM    ICD-10-CM    1. Dizziness  R42       2. Vertigo  R42           Medical Decision Making     89-year-old  female with history of blindness, CVA with left lower extremity residual weakness, arthritis, hypercholesterolemia, HTN, and type 2 diabetes presents to the emergency department via EMS for complaint of sudden onset dizziness after walking in to the house from sitting in the yard.  Patient states the room started to move and she lost her balance, and was caught by her caretaker and placed in a chair.  Patient complains of continued to feel out of sorts, with mild sense of off balance.  She denies any new weaknesses or numbness or tingling or headache.  Her only blood thinner is a baby aspirin a day.  On exam, patient has mild right-sided pronator drift, left lower extremity weakness which is not new for the patient, and is clinically blind only to see a few hand movements and with light.  When the patient was sat upright, the dizziness became much worse.  She has bilateral hearing aids, and TMs were not visualized.  Due to the acute onset 2 hours PTA, stroke was initiated.  CT revealed no acute intracranial process, the patient was evaluated by neurology, CTA was negative and the patient was ordered an MRI.  Lab work revealed a mildly concentrated urine with no evidence of infection, normal BMP.  Normal CBC.  Neurology recommended MRI in the department, and if negative discharged home with close family practice follow-up and possible outpatient PT/OT.  MRI came back negative for acute intracranial process, and the patient had been given meclizine for her dizziness with significant improvement.  The patient was able to ambulate with minimal assistance and wanted to go home.  Patient was discharged home with short course of

## 2025-05-13 NOTE — ED TRIAGE NOTES
Patient coming from home.     Dizziness started around 1:30 this afternoon. Was dizzy outside, came in and lied down and still dizzy.     No other symptoms.     Patient has hx of stroke 2 years ago. Left leg deficit.     A/O x4     Patient is visually impaired.

## 2025-05-13 NOTE — PROGRESS NOTES
Requesting MRI screening form prior to exam. Signatures are required by RN and patient / legal authority.

## 2025-05-13 NOTE — ED NOTES
Patient takes her medication with apple sauce at home. Tested and patient has no trouble eating applesauce.

## 2025-05-14 ENCOUNTER — OFFICE VISIT (OUTPATIENT)
Dept: ENT CLINIC | Age: 89
End: 2025-05-14
Payer: MEDICARE

## 2025-05-14 VITALS — HEIGHT: 62 IN | RESPIRATION RATE: 18 BRPM | BODY MASS INDEX: 22.08 KG/M2 | WEIGHT: 120 LBS

## 2025-05-14 DIAGNOSIS — H61.23 EXCESSIVE CERUMEN IN EAR CANAL, BILATERAL: ICD-10-CM

## 2025-05-14 DIAGNOSIS — J32.0 CHRONIC MAXILLARY SINUSITIS: ICD-10-CM

## 2025-05-14 DIAGNOSIS — R13.14 PHARYNGOESOPHAGEAL DYSPHAGIA: Primary | ICD-10-CM

## 2025-05-14 PROCEDURE — 1160F RVW MEDS BY RX/DR IN RCRD: CPT | Performed by: STUDENT IN AN ORGANIZED HEALTH CARE EDUCATION/TRAINING PROGRAM

## 2025-05-14 PROCEDURE — 1126F AMNT PAIN NOTED NONE PRSNT: CPT | Performed by: STUDENT IN AN ORGANIZED HEALTH CARE EDUCATION/TRAINING PROGRAM

## 2025-05-14 PROCEDURE — 1036F TOBACCO NON-USER: CPT | Performed by: STUDENT IN AN ORGANIZED HEALTH CARE EDUCATION/TRAINING PROGRAM

## 2025-05-14 PROCEDURE — G8420 CALC BMI NORM PARAMETERS: HCPCS | Performed by: STUDENT IN AN ORGANIZED HEALTH CARE EDUCATION/TRAINING PROGRAM

## 2025-05-14 PROCEDURE — 31575 DIAGNOSTIC LARYNGOSCOPY: CPT | Performed by: STUDENT IN AN ORGANIZED HEALTH CARE EDUCATION/TRAINING PROGRAM

## 2025-05-14 PROCEDURE — G8427 DOCREV CUR MEDS BY ELIG CLIN: HCPCS | Performed by: STUDENT IN AN ORGANIZED HEALTH CARE EDUCATION/TRAINING PROGRAM

## 2025-05-14 PROCEDURE — 1090F PRES/ABSN URINE INCON ASSESS: CPT | Performed by: STUDENT IN AN ORGANIZED HEALTH CARE EDUCATION/TRAINING PROGRAM

## 2025-05-14 PROCEDURE — 99214 OFFICE O/P EST MOD 30 MIN: CPT | Performed by: STUDENT IN AN ORGANIZED HEALTH CARE EDUCATION/TRAINING PROGRAM

## 2025-05-14 PROCEDURE — 1159F MED LIST DOCD IN RCRD: CPT | Performed by: STUDENT IN AN ORGANIZED HEALTH CARE EDUCATION/TRAINING PROGRAM

## 2025-05-14 PROCEDURE — 69210 REMOVE IMPACTED EAR WAX UNI: CPT | Performed by: STUDENT IN AN ORGANIZED HEALTH CARE EDUCATION/TRAINING PROGRAM

## 2025-05-14 PROCEDURE — 1123F ACP DISCUSS/DSCN MKR DOCD: CPT | Performed by: STUDENT IN AN ORGANIZED HEALTH CARE EDUCATION/TRAINING PROGRAM

## 2025-05-14 ASSESSMENT — ENCOUNTER SYMPTOMS
CONSTIPATION: 0
FACIAL SWELLING: 0
SHORTNESS OF BREATH: 0
TROUBLE SWALLOWING: 1
APNEA: 0
DIARRHEA: 0
STRIDOR: 0
EYE DISCHARGE: 0
EYE PAIN: 0
WHEEZING: 0
NAUSEA: 0
SINUS PRESSURE: 0
COUGH: 0
CHOKING: 0
SINUS PAIN: 0
EYE ITCHING: 0

## 2025-05-14 NOTE — PROGRESS NOTES
[facility] Office Note    Patient: Brenda Nobles  MRN: [id number]  : 1932  Gender:  female  Vital Signs: Resp 18   Ht 1.575 m (5' 2\")   Wt 54.4 kg (120 lb)   BMI 21.95 kg/m²   Date: 2025    CC:   Chief Complaint   Patient presents with    Other     Throat issues, trouble swallowing since her stroke 2 years ago and sinus drainage. Pt also was having dizziness yesterday and was prescribed meclizine. She has not started this yet as she wanted to make sure she needed to take it.        HPI:  Brenda Nobles is a 89 y.o. female here for evaluation of dysphagia, rhinorrhea, and postnasal drainage.  She had MBS on 3 - 10 - 25:   Patient presents with mild oral dysphagia characterized by decreased bolus formation and reduced propulsion skills. Mild tongue pumping when attempting to propel bolus posteriorly.  Thin liquid trials triggered timely; Solid bolus spilled to vallecula with 1-3 second pooling prior to swallow. Appropriate hyolaryngeal elevation and excursion with all swallows. Only trace, swallow, transient penetration with straw sips. No additional penetration or aspiration observed. Mild residue at level of UES after dry solids which cleared spontaneously with liquid rinse.  She has a history of esophageal dysmotility.  She endorses right-sided rhinorrhea, postnasal drainage, facial pressure, drainage is discolored.  She has associated cough and throat clearing.    Past Medical History, Past Surgical History, Family history, Social History, and Medications were all reviewed with the patient today and updated as necessary.     Allergies   Allergen Reactions    Atorvastatin Other (See Comments)     Muscle aches    Ezetimibe-Simvastatin Other (See Comments)     Hair loss    Penicillins     Sulfa Antibiotics Rash     Patient Active Problem List   Diagnosis    Esophageal dysmotility    Age-related osteoporosis without current pathological fracture    Osteoarthritis, hand    Visual impairment severe

## 2025-05-14 NOTE — ED NOTES
Pt ambulated well with walker. Minimal dizziness on initial sitting up that subsided and did not return while ambulating.

## 2025-06-13 DIAGNOSIS — I10 PRIMARY HYPERTENSION: ICD-10-CM

## 2025-06-13 DIAGNOSIS — E78.01 FAMILIAL HYPERCHOLESTEREMIA: ICD-10-CM

## 2025-06-13 DIAGNOSIS — E11.65 TYPE 2 DIABETES MELLITUS WITH HYPERGLYCEMIA, WITHOUT LONG-TERM CURRENT USE OF INSULIN (HCC): ICD-10-CM

## 2025-06-13 LAB
BASOPHILS # BLD: 0.04 K/UL (ref 0–0.2)
BASOPHILS NFR BLD: 0.5 % (ref 0–2)
CHOLEST SERPL-MCNC: 133 MG/DL (ref 0–200)
DIFFERENTIAL METHOD BLD: NORMAL
EOSINOPHIL # BLD: 0.13 K/UL (ref 0–0.8)
EOSINOPHIL NFR BLD: 1.7 % (ref 0.5–7.8)
ERYTHROCYTE [DISTWIDTH] IN BLOOD BY AUTOMATED COUNT: 13 % (ref 11.9–14.6)
EST. AVERAGE GLUCOSE BLD GHB EST-MCNC: 127 MG/DL
HBA1C MFR BLD: 6 % (ref 0–5.6)
HCT VFR BLD AUTO: 43.9 % (ref 35.8–46.3)
HDLC SERPL-MCNC: 53 MG/DL (ref 40–60)
HDLC SERPL: 2.5 (ref 0–5)
HGB BLD-MCNC: 14.4 G/DL (ref 11.7–15.4)
IMM GRANULOCYTES # BLD AUTO: 0.03 K/UL (ref 0–0.5)
IMM GRANULOCYTES NFR BLD AUTO: 0.4 % (ref 0–5)
LDLC SERPL CALC-MCNC: 60 MG/DL (ref 0–100)
LYMPHOCYTES # BLD: 2.19 K/UL (ref 0.5–4.6)
LYMPHOCYTES NFR BLD: 28.9 % (ref 13–44)
MCH RBC QN AUTO: 31.4 PG (ref 26.1–32.9)
MCHC RBC AUTO-ENTMCNC: 32.8 G/DL (ref 31.4–35)
MCV RBC AUTO: 95.6 FL (ref 82–102)
MONOCYTES # BLD: 0.66 K/UL (ref 0.1–1.3)
MONOCYTES NFR BLD: 8.7 % (ref 4–12)
NEUTS SEG # BLD: 4.54 K/UL (ref 1.7–8.2)
NEUTS SEG NFR BLD: 59.8 % (ref 43–78)
NRBC # BLD: 0 K/UL (ref 0–0.2)
PLATELET # BLD AUTO: 251 K/UL (ref 150–450)
PMV BLD AUTO: 11.3 FL (ref 9.4–12.3)
RBC # BLD AUTO: 4.59 M/UL (ref 4.05–5.2)
TRIGL SERPL-MCNC: 98 MG/DL (ref 0–150)
VLDLC SERPL CALC-MCNC: 20 MG/DL (ref 6–23)
WBC # BLD AUTO: 7.6 K/UL (ref 4.3–11.1)

## 2025-06-16 ENCOUNTER — PREP FOR PROCEDURE (OUTPATIENT)
Age: 89
End: 2025-06-16

## 2025-06-16 ENCOUNTER — TELEPHONE (OUTPATIENT)
Age: 89
End: 2025-06-16

## 2025-06-16 ENCOUNTER — OFFICE VISIT (OUTPATIENT)
Age: 89
End: 2025-06-16
Payer: MEDICARE

## 2025-06-16 VITALS
HEIGHT: 62 IN | TEMPERATURE: 97.9 F | DIASTOLIC BLOOD PRESSURE: 70 MMHG | HEART RATE: 73 BPM | WEIGHT: 106.2 LBS | SYSTOLIC BLOOD PRESSURE: 115 MMHG | BODY MASS INDEX: 19.54 KG/M2 | OXYGEN SATURATION: 95 %

## 2025-06-16 DIAGNOSIS — Z86.73 HISTORY OF ISCHEMIC STROKE: ICD-10-CM

## 2025-06-16 DIAGNOSIS — R13.10 DYSPHAGIA, UNSPECIFIED TYPE: Primary | ICD-10-CM

## 2025-06-16 DIAGNOSIS — Z87.19 HX OF HIATAL HERNIA: ICD-10-CM

## 2025-06-16 PROCEDURE — 1123F ACP DISCUSS/DSCN MKR DOCD: CPT | Performed by: PHYSICIAN ASSISTANT

## 2025-06-16 PROCEDURE — 1126F AMNT PAIN NOTED NONE PRSNT: CPT | Performed by: PHYSICIAN ASSISTANT

## 2025-06-16 PROCEDURE — G8427 DOCREV CUR MEDS BY ELIG CLIN: HCPCS | Performed by: PHYSICIAN ASSISTANT

## 2025-06-16 PROCEDURE — 1159F MED LIST DOCD IN RCRD: CPT | Performed by: PHYSICIAN ASSISTANT

## 2025-06-16 PROCEDURE — 1160F RVW MEDS BY RX/DR IN RCRD: CPT | Performed by: PHYSICIAN ASSISTANT

## 2025-06-16 PROCEDURE — 99204 OFFICE O/P NEW MOD 45 MIN: CPT | Performed by: PHYSICIAN ASSISTANT

## 2025-06-16 PROCEDURE — G8420 CALC BMI NORM PARAMETERS: HCPCS | Performed by: PHYSICIAN ASSISTANT

## 2025-06-16 PROCEDURE — 1036F TOBACCO NON-USER: CPT | Performed by: PHYSICIAN ASSISTANT

## 2025-06-16 PROCEDURE — 1090F PRES/ABSN URINE INCON ASSESS: CPT | Performed by: PHYSICIAN ASSISTANT

## 2025-06-16 RX ORDER — SODIUM CHLORIDE 9 MG/ML
25 INJECTION, SOLUTION INTRAVENOUS PRN
Status: CANCELLED | OUTPATIENT
Start: 2025-06-16

## 2025-06-16 RX ORDER — SODIUM CHLORIDE 0.9 % (FLUSH) 0.9 %
5-40 SYRINGE (ML) INJECTION EVERY 12 HOURS SCHEDULED
Status: CANCELLED | OUTPATIENT
Start: 2025-06-16

## 2025-06-16 RX ORDER — ACETAMINOPHEN 160 MG
2000 TABLET,DISINTEGRATING ORAL DAILY
COMMUNITY

## 2025-06-16 RX ORDER — SODIUM CHLORIDE 0.9 % (FLUSH) 0.9 %
5-40 SYRINGE (ML) INJECTION PRN
Status: CANCELLED | OUTPATIENT
Start: 2025-06-16

## 2025-06-16 NOTE — PROGRESS NOTES
Brenda Nobles (:  1932) is a 89 y.o. female new patient referred to our office for evaluation of the following chief complaint(s):  New Patient and Dysphagia (ENT suggested upper GI)        Assessment & Plan   ASSESSMENT/PLAN:  1. Dysphagia, unspecified type  -     [facility] by Nova Walsh  2. Hx of hiatal hernia  3. History of ischemic stroke      -Discussed risks/benefits/alternatives (including barium esophagram) and she would like to proceed with EGD/dilation given the frequency and severity of symptoms. Will arrange. Will also arrange for  speech therapy evaluation.     Subjective   SUBJECTIVE/OBJECTIVE  Brenda Nobles is a 89 y.o. year old female referred to our office for evaluation of pharyngoesophageal dysphagia. Referral note reviewed from ENT OV 2025.      Prior pertinent GI evaluation includes:    -Barium esophagram 2014: Moderate esophageal dysmotility while in the prone position, 13 mm tablet remained within the distal esophagus despite repeated swallows, no obvious luminal narrowing present at this level, hang up may be the result of esophageal motility issues  - EGD 2023 (Dr. Olivares): 5 cm sliding-type hiatal hernia without Reji's ulcers, Hill grade IV flap valve, normal esophagus and duodenum; Duodenal biopsies histologically unremarkable, random gastric biopsy showed mild chronic inactive gastritis  - Colonoscopy 2023 (Dr. Olivares): Good prep, tortuous sigmoid colon, splenic flexure, and hepatic flexure, no old or fresh blood noted, no polyps  -MBS 3/10/2025: trace laryngeal penetration with thin liquids, no intratracheal aspiration    Today patient presents with two caregivers. She lives at home. She reports food, liquids, pills don't want to pass as smoothly as they did prior to her stroke 2 years ago. She often regurgitates, coughs, and gasps for air according to her caregivers. She has a hx of GERD and denies any breakthrough acid reflux

## 2025-06-16 NOTE — TELEPHONE ENCOUNTER
Patient in office, scheduled for EGD with dilation with Dr. Perez on Monday 6/23/2025 at [facility]. Instructions given to the patient in office.       The day before you test->     You should NOT eat or drink after midnight.

## 2025-06-17 NOTE — PROGRESS NOTES
Patient verified name, , and procedure. Pt states that she is forgetful and asks that I call her friend, Judy Garrido, to complete her PAT call. Verbal consent received from pt to do so.     Type: 1A; abbreviated assessment per anesthesia guidelines.    Labs per anesthesia: POCT Glucose- held for DOS.  EKG: Not needed, per anesthesia guidelines.     Instructed that they will be notified the day before their procedure by the GI Lab for time of arrival if their procedure is [place] and Pre-op for [place] cases. Arrival times should be called by 5 pm. If no phone is received the patient should contact their respective hospital. The GI lab telephone number is [phone] and ES Pre-op is [phone].     Follow diet and prep instructions per office, including NPO status.    Bath or shower the night before and the am of surgery with non-moisturizing soap. No lotions, oils, powders, perfumes, or cologne on skin. No make up, eye make up or jewelry. Wear loose fitting comfortable, clean clothing.     Must have adult present in building the entire time .     Medications to take on the day of procedure: Aspirin, Metformin, Pravastatin, Omeprazole, per anesthesia guidelines.    Medications to hold: None, per anesthesia guidelines.    Patient instructed to hold all vitamins/supplements 7 days prior to surgery and NSAIDS 5 days prior to surgery. Verbalized understanding.     The following discharge instructions reviewed: medication given during procedure may cause drowsiness for several hours, therefore, do not drive or operate machinery for remainder of the day, no alcohol on the day of your procedure, resume regular diet and activity unless otherwise directed, for mild sore throat you may use Cepacol throat lozenges or warm salt water gargles as needed, call your physician for any problems or questions. Verbalizes understanding.

## 2025-06-18 ENCOUNTER — TELEPHONE (OUTPATIENT)
Dept: GASTROENTEROLOGY | Age: 89
End: 2025-06-18

## 2025-06-18 NOTE — TELEPHONE ENCOUNTER
Nery with Home Health called and stated patient requested to start her assessment from referral on 6/25 and they need verbal approval. Please call Nery at [phone] to give authorization.

## 2025-06-20 NOTE — PROGRESS NOTES
Spoke with pt's caregiver regarding tomorrows procedure. Informed pt of arrival time,  policy, NPO status and admitting instructions. verbalized understanding.

## 2025-06-22 ENCOUNTER — ANESTHESIA EVENT (OUTPATIENT)
Dept: ENDOSCOPY | Age: 89
End: 2025-06-22
Payer: MEDICARE

## 2025-06-22 RX ORDER — NALOXONE HYDROCHLORIDE 0.4 MG/ML
INJECTION, SOLUTION INTRAMUSCULAR; INTRAVENOUS; SUBCUTANEOUS PRN
Status: CANCELLED | OUTPATIENT
Start: 2025-06-22

## 2025-06-22 RX ORDER — SODIUM CHLORIDE, SODIUM LACTATE, POTASSIUM CHLORIDE, CALCIUM CHLORIDE 600; 310; 30; 20 MG/100ML; MG/100ML; MG/100ML; MG/100ML
INJECTION, SOLUTION INTRAVENOUS CONTINUOUS
Status: CANCELLED | OUTPATIENT
Start: 2025-06-22

## 2025-06-22 RX ORDER — SODIUM CHLORIDE 9 MG/ML
INJECTION, SOLUTION INTRAVENOUS PRN
Status: CANCELLED | OUTPATIENT
Start: 2025-06-22

## 2025-06-22 RX ORDER — SODIUM CHLORIDE 0.9 % (FLUSH) 0.9 %
5-40 SYRINGE (ML) INJECTION PRN
Status: CANCELLED | OUTPATIENT
Start: 2025-06-22

## 2025-06-22 RX ORDER — SODIUM CHLORIDE 0.9 % (FLUSH) 0.9 %
5-40 SYRINGE (ML) INJECTION EVERY 12 HOURS SCHEDULED
Status: CANCELLED | OUTPATIENT
Start: 2025-06-22

## 2025-06-22 RX ORDER — LIDOCAINE HYDROCHLORIDE 10 MG/ML
1 INJECTION, SOLUTION INFILTRATION; PERINEURAL
Status: CANCELLED | OUTPATIENT
Start: 2025-06-22

## 2025-06-23 ENCOUNTER — ANESTHESIA (OUTPATIENT)
Dept: ENDOSCOPY | Age: 89
End: 2025-06-23
Payer: MEDICARE

## 2025-06-23 ENCOUNTER — HOSPITAL ENCOUNTER (OUTPATIENT)
Age: 89
Discharge: HOME | End: 2025-06-23
Attending: INTERNAL MEDICINE | Admitting: INTERNAL MEDICINE
Payer: MEDICARE

## 2025-06-23 VITALS
TEMPERATURE: 97.9 F | HEART RATE: 58 BPM | HEIGHT: 61 IN | OXYGEN SATURATION: 99 % | SYSTOLIC BLOOD PRESSURE: 159 MMHG | DIASTOLIC BLOOD PRESSURE: 55 MMHG | WEIGHT: 105 LBS | BODY MASS INDEX: 19.83 KG/M2 | RESPIRATION RATE: 17 BRPM

## 2025-06-23 PROCEDURE — 2580000003 HC RX 258: Performed by: NURSE ANESTHETIST, CERTIFIED REGISTERED

## 2025-06-23 PROCEDURE — C1769 GUIDE WIRE: HCPCS | Performed by: INTERNAL MEDICINE

## 2025-06-23 PROCEDURE — 3700000000 HC ANESTHESIA ATTENDED CARE: Performed by: INTERNAL MEDICINE

## 2025-06-23 PROCEDURE — 6360000002 HC RX W HCPCS: Performed by: NURSE ANESTHETIST, CERTIFIED REGISTERED

## 2025-06-23 PROCEDURE — 7100000011 HC PHASE II RECOVERY - ADDTL 15 MIN: Performed by: INTERNAL MEDICINE

## 2025-06-23 PROCEDURE — 2709999900 HC NON-CHARGEABLE SUPPLY: Performed by: INTERNAL MEDICINE

## 2025-06-23 PROCEDURE — 43248 EGD GUIDE WIRE INSERTION: CPT | Performed by: INTERNAL MEDICINE

## 2025-06-23 PROCEDURE — 3609012700 HC EGD DILATION SAVORY: Performed by: INTERNAL MEDICINE

## 2025-06-23 PROCEDURE — 7100000010 HC PHASE II RECOVERY - FIRST 15 MIN: Performed by: INTERNAL MEDICINE

## 2025-06-23 RX ORDER — LIDOCAINE HYDROCHLORIDE 20 MG/ML
INJECTION, SOLUTION EPIDURAL; INFILTRATION; INTRACAUDAL; PERINEURAL
Status: DISCONTINUED | OUTPATIENT
Start: 2025-06-23 | End: 2025-06-23 | Stop reason: SDUPTHER

## 2025-06-23 RX ORDER — SODIUM CHLORIDE, SODIUM LACTATE, POTASSIUM CHLORIDE, CALCIUM CHLORIDE 600; 310; 30; 20 MG/100ML; MG/100ML; MG/100ML; MG/100ML
INJECTION, SOLUTION INTRAVENOUS
Status: DISCONTINUED | OUTPATIENT
Start: 2025-06-23 | End: 2025-06-23 | Stop reason: SDUPTHER

## 2025-06-23 RX ORDER — PROPOFOL 10 MG/ML
INJECTION, EMULSION INTRAVENOUS
Status: DISCONTINUED | OUTPATIENT
Start: 2025-06-23 | End: 2025-06-23 | Stop reason: SDUPTHER

## 2025-06-23 RX ADMIN — PROPOFOL 30 MG: 10 INJECTION, EMULSION INTRAVENOUS at 13:38

## 2025-06-23 RX ADMIN — PROPOFOL 200 MCG/KG/MIN: 10 INJECTION, EMULSION INTRAVENOUS at 13:37

## 2025-06-23 RX ADMIN — SODIUM CHLORIDE, SODIUM LACTATE, POTASSIUM CHLORIDE, AND CALCIUM CHLORIDE: 600; 310; 30; 20 INJECTION, SOLUTION INTRAVENOUS at 13:20

## 2025-06-23 RX ADMIN — LIDOCAINE HYDROCHLORIDE 80 MG: 20 INJECTION, SOLUTION EPIDURAL; INFILTRATION; INTRACAUDAL; PERINEURAL at 13:36

## 2025-06-23 ASSESSMENT — PAIN SCALES - GENERAL: PAINLEVEL_OUTOF10: 0

## 2025-06-23 NOTE — H&P
GASTROENTEROLOGY H&P    Brenda Nobles is 89 y.o. y/o female here for dysphagia       Past Medical History:   Diagnosis Date    Acute CVA (cerebrovascular accident) ([facility]) 10/27/2023    No residual problems    Arthritis     Chest pain 9/11/2014    Dysphagia 9/11/2014    Edema 9/11/2014    Esophageal dysmotility 9/12/2014    Hypercholesterolemia     Hypertension 9/11/2014    Low back pain     Other ill-defined conditions(799.89)     high cholesterol    Recurrent upper respiratory infection (URI) 1/2023    Shingles     Sinusitis 1/2023    Stroke ([facility]) 2023    Type 2 diabetes mellitus without complication, without long-term current use of insulin ([facility]) 12/22/2023    A1C- 6.0 on 06/13/2025; Oral meds; Does not check BS at home; Denies s/sx of low BS     Past Surgical History:   Procedure Laterality Date    APPENDECTOMY      incidental    COLONOSCOPY  10/03    COLONOSCOPY N/A 01/30/2023    COLONOSCOPY DIAGNOSTIC performed by Shady Olivares MD at [facility] ENDOSCOPY    HYSTERECTOMY (CERVIX STATUS UNKNOWN)      HYSTERECTOMY, TOTAL ABDOMINAL (CERVIX REMOVED)      UPPER GASTROINTESTINAL ENDOSCOPY N/A 01/30/2023    EGD BIOPSY performed by Shady Olivares MD at [facility] ENDOSCOPY     Family History   Problem Relation Age of Onset    Breast Cancer Mother 40     Social History     Tobacco Use    Smoking status: Never     Passive exposure: Never    Smokeless tobacco: Never   Vaping Use    Vaping status: Never Used   Substance Use Topics    Alcohol use: Not Currently    Drug use: Never         PE:   There were no vitals filed for this visit.   General:  The patient appears well-nourished, and is in no acute distress.    HEENT:  Normocephalic, atraumatic. No sclerae icterus.   Respiratory: Respiratory effort is normal.     Cardiovascular:  Regular rate and rhythm.     Abdomen:  Soft, non tender to palpation. No distention.     Assessment and Plan:   1. Dysphagia, unspecified type  2. Hx of hiatal hernia  3. History of ischemic

## 2025-06-23 NOTE — ANESTHESIA POSTPROCEDURE EVALUATION
Department of Anesthesiology  Postprocedure Note    Patient: Brenda Nobles  MRN: [id number]  YOB: 1932  Date of evaluation: 6/23/2025    Procedure Summary       Date: 06/23/25 Room / Location: [facility] ENDO 03 / [facility] ENDOSCOPY    Anesthesia Start: 1334 Anesthesia Stop: 1350    Procedure: ESOPHAGOGASTRODUODENOSCOPY WITH DILATION (Upper GI Region) Diagnosis:       Dysphagia, unspecified type      Hx of hiatal hernia      History of ischemic stroke      (Dysphagia, unspecified type [R13.10])      (Hx of hiatal hernia [Z87.19])      (History of ischemic stroke [Z86.73])    Surgeons: Yudith Perez MD Responsible Provider: Leonidas Butcher DO    Anesthesia Type: TIVA ASA Status: 3            Anesthesia Type: No value filed.    Asa Phase I: Asa Score: 10    Asa Phase II: Asa Score: 10    Anesthesia Post Evaluation    Patient location during evaluation: PACU  Level of consciousness: awake and alert  Airway patency: patent  Nausea & Vomiting: no nausea  Cardiovascular status: hemodynamically stable  Respiratory status: acceptable  Hydration status: euvolemic  Comments: Blood pressure (!) 159/55, pulse 58, temperature 97.9 °F (36.6 °C), resp. rate 17, height 1.549 m (5' 1\"), weight 47.6 kg (105 lb), SpO2 99%.  Pain management: satisfactory to patient    No notable events documented.

## 2025-06-23 NOTE — DISCHARGE INSTRUCTIONS
Gastrointestinal Esophagogastroduodenoscopy (EGD) - Upper Exam Discharge Instructions    1. Call Dr. Perez at  for any problems or questions.    2. Contact the doctor's office for follow up appointment as directed.    3. Medication may cause drowsiness for several hours, therefore:  Do not drive or operate machinery for remainder of the day.    No alcohol today.  Do not make any important or legal decisions for 24 hours.  Do not sign any legal documents for 24 hours.    5. Ordinarily, you may resume regular diet and activity after exam unless otherwise specified by your physician.    6. For mild soreness in your throat you may use Cepacol throat lozenges or warm salt-water gargles as needed.

## 2025-06-23 NOTE — ANESTHESIA PRE PROCEDURE
Department of Anesthesiology  Preprocedure Note       Name:  Brenda Nobles   Age:  89 y.o.  :  1932                                          MRN:  [id number]         Date:  2025      Surgeon: Surgeon(s):  Yudith Perez MD    Procedure: Procedure(s):  ESOPHAGOGASTRODUODENOSCOPY WITH DILATION    Medications prior to admission:   Prior to Admission medications    Medication Sig Start Date End Date Taking? Authorizing Provider   vitamin D (VITAMIN D3) 50 MCG (2000) CAPS capsule Take 1 capsule by mouth daily   Yes Fede Malave MD   Coenzyme Q10 (CO Q 10 PO) Take by mouth daily   Yes Fede Malave MD   lisinopril (PRINIVIL;ZESTRIL) 20 MG tablet Take 1 tablet by mouth daily 3/12/25  Yes Shaina Cabrera MD   metFORMIN (GLUCOPHAGE) 500 MG tablet Take 1 tablet by mouth 2 times daily 3/12/25  Yes Shaina Cabrera MD   omeprazole (PRILOSEC) 20 MG delayed release capsule Take 1 capsule by mouth daily 3/12/25  Yes Shaina Cabrera MD   pravastatin (PRAVACHOL) 20 MG tablet Take 1 tablet by mouth daily 3/12/25  Yes Shaina Cabrera MD   vibegron (GEMTESA) 75 MG TABS tablet Take 1 tablet by mouth daily 3/11/25  Yes Hesham Walter MD   vitamin B-12 (CYANOCOBALAMIN) 1000 MCG tablet Take 1 tablet by mouth daily 25 Yes Shaina Cabrera MD   aspirin 81 MG EC tablet Take 1 tablet by mouth daily   Yes Automatic Reconciliation, Ar   Evolocumab (REPATHA) SOSY syringe Inject 3 mLs into the skin every 30 days  Patient not taking: Reported on 2025   Michael Saldana PA-C   blood glucose monitor strips Freestyle Freedom LiteTest strips. Test daily as directed. DX: E11.9  Patient not taking: Reported on 23   Love Perera MD   Lancets MISC 1 each by Does not apply route daily Freestyle Freedom Lite Lancets. Test daily as directed. DX: E11.9  Patient not taking: Reported on 23   Love Perera MD   glucose monitoring kit 1 kit by

## 2025-06-30 ENCOUNTER — OFFICE VISIT (OUTPATIENT)
Dept: INTERNAL MEDICINE CLINIC | Facility: CLINIC | Age: 89
End: 2025-06-30

## 2025-06-30 VITALS
HEART RATE: 62 BPM | WEIGHT: 106.4 LBS | SYSTOLIC BLOOD PRESSURE: 114 MMHG | DIASTOLIC BLOOD PRESSURE: 68 MMHG | OXYGEN SATURATION: 98 % | BODY MASS INDEX: 20.1 KG/M2

## 2025-06-30 DIAGNOSIS — E78.01 FAMILIAL HYPERCHOLESTEREMIA: ICD-10-CM

## 2025-06-30 DIAGNOSIS — Z86.73 HISTORY OF CVA (CEREBROVASCULAR ACCIDENT): ICD-10-CM

## 2025-06-30 DIAGNOSIS — D03.70 MELANOMA IN SITU OF LOWER EXTREMITY, UNSPECIFIED LATERALITY (HCC): ICD-10-CM

## 2025-06-30 DIAGNOSIS — I10 PRIMARY HYPERTENSION: ICD-10-CM

## 2025-06-30 DIAGNOSIS — M81.0 AGE-RELATED OSTEOPOROSIS WITHOUT CURRENT PATHOLOGICAL FRACTURE: ICD-10-CM

## 2025-06-30 DIAGNOSIS — E11.9 TYPE 2 DIABETES MELLITUS WITHOUT COMPLICATION, WITHOUT LONG-TERM CURRENT USE OF INSULIN (HCC): Primary | ICD-10-CM

## 2025-06-30 DIAGNOSIS — K21.9 GASTROESOPHAGEAL REFLUX DISEASE WITHOUT ESOPHAGITIS: ICD-10-CM

## 2025-06-30 DIAGNOSIS — K22.2 SCHATZKI'S RING: ICD-10-CM

## 2025-06-30 NOTE — PROGRESS NOTES
about 4 months (around 10/30/2025) for routine follow up, with fasting labs 1 week prior.     The patient (or guardian, if applicable) and other individuals in attendance with the patient were advised that Artificial Intelligence will be utilized during this visit to record, process the conversation to generate a clinical note, and support improvement of the AI technology. The patient (or guardian, if applicable) and other individuals in attendance at the appointment consented to the use of AI, including the recording.

## 2025-07-08 ENCOUNTER — TELEPHONE (OUTPATIENT)
Dept: INTERNAL MEDICINE CLINIC | Facility: CLINIC | Age: 89
End: 2025-07-08

## 2025-07-08 NOTE — TELEPHONE ENCOUNTER
Dr. Perera patient and daughter called and stated the diagnosis or the home health order has to be changed. They couldn't use history of CVA

## 2025-07-22 ENCOUNTER — CARE COORDINATION (OUTPATIENT)
Dept: CARE COORDINATION | Facility: CLINIC | Age: 89
End: 2025-07-22

## 2025-07-22 SDOH — HEALTH STABILITY: PHYSICAL HEALTH: ON AVERAGE, HOW MANY DAYS PER WEEK DO YOU ENGAGE IN MODERATE TO STRENUOUS EXERCISE (LIKE A BRISK WALK)?: 0 DAYS

## 2025-07-22 ASSESSMENT — PATIENT HEALTH QUESTIONNAIRE - PHQ9
SUM OF ALL RESPONSES TO PHQ QUESTIONS 1-9: 0
2. FEELING DOWN, DEPRESSED OR HOPELESS: NOT AT ALL
1. LITTLE INTEREST OR PLEASURE IN DOING THINGS: NOT AT ALL

## 2025-07-22 ASSESSMENT — LIFESTYLE VARIABLES
HOW MANY STANDARD DRINKS CONTAINING ALCOHOL DO YOU HAVE ON A TYPICAL DAY: 0
HOW MANY STANDARD DRINKS CONTAINING ALCOHOL DO YOU HAVE ON A TYPICAL DAY: PATIENT DOES NOT DRINK
HOW OFTEN DO YOU HAVE A DRINK CONTAINING ALCOHOL: NEVER
HOW OFTEN DO YOU HAVE SIX OR MORE DRINKS ON ONE OCCASION: 1
HOW OFTEN DO YOU HAVE A DRINK CONTAINING ALCOHOL: 1

## 2025-07-22 NOTE — CARE COORDINATION
Ambulatory Care Coordination Note     7/22/2025 12:52 PM     Outreach for potential enrollment in case management services  Unable to reach, unable to leave VM - mailbox full  Recent PCP visit:   - DM2 very well controlled   - hypertension - well controlled 114/68   - osteoporosis - reclast   - Repatha for cholesterol mgmt under neurology care s/p stroke   - legally blind     ACM: Jaclyn Chavira, RN     PCP/Specialist follow up:   Future Appointments         Provider Specialty Dept Phone    7/25/2025 2:00 PM Love Perera MD Internal Medicine [phone]    10/28/2025 1:00 PM Michael Saldana PA-C Neurology [phone]    10/30/2025 1:40 PM Love Perera MD Internal Medicine [phone]    3/17/2026 2:30 PM Hesham Walter MD Urology [phone]

## 2025-07-25 ENCOUNTER — TELEMEDICINE (OUTPATIENT)
Dept: INTERNAL MEDICINE CLINIC | Facility: CLINIC | Age: 89
End: 2025-07-25

## 2025-07-25 DIAGNOSIS — Z00.00 MEDICARE ANNUAL WELLNESS VISIT, SUBSEQUENT: Primary | ICD-10-CM

## 2025-07-25 NOTE — PROGRESS NOTES
Medicare Annual Wellness Visit    Brenda Nobles is here for Medicare AWV (Phone call )    Assessment & Plan   Medicare annual wellness visit, subsequent     No follow-ups on file.     Subjective       Patient's complete Health Risk Assessment and screening values have been reviewed and are found in Flowsheets. The following problems were reviewed today and where indicated follow up appointments were made and/or referrals ordered.    Positive Risk Factor Screenings with Interventions:    Fall Risk:  Do you feel unsteady or are you worried about falling? : (!) yes  2 or more falls in past year?: (!) yes  Fall with injury in past year?: (!) yes  Interventions:    Reviewed medications, home hazards, visual acuity, and co-morbidities that can increase risk for falls  Patient uses a walker.             Inactivity:  On average, how many days per week do you engage in moderate to strenuous exercise (like a brisk walk)?: 0 days (!) Abnormal     Interventions:  See AVS for additional education material        Vision Screen:  Do you have difficulty driving, watching TV, or doing any of your daily activities because of your eyesight?: (!) Yes  Have you had an eye exam within the past year?: Yes  Interventions:   See AVS for additional education material      ADL's:   Patient reports needing help with:  Select all that apply: (!) Eating, Dressing, Grooming, Bathing, Toileting, Walking/Balance  Select all that apply: (!) Laundry, Housekeeping, Banking/Finances, Shopping, Telephone Use, Food Preparation, Transportation, Taking Medications  Interventions:  See AVS for additional education material                  Objective    Patient-Reported Vitals  No data recorded               Allergies   Allergen Reactions    Atorvastatin Other (See Comments)     Muscle aches    Ezetimibe-Simvastatin Other (See Comments)     Hair loss    Penicillins     Sulfa Antibiotics Rash     Prior to Visit Medications    Medication Sig Taking?

## 2025-08-04 ENCOUNTER — TELEPHONE (OUTPATIENT)
Dept: INTERNAL MEDICINE CLINIC | Facility: CLINIC | Age: 89
End: 2025-08-04

## (undated) DEVICE — BALLOON US E LIN RNG O KT FOR FG-32UA

## (undated) DEVICE — AIRLIFE™ OXYGEN TUBING 7 FEET (2.1 M) CRUSH RESISTANT OXYGEN TUBING, VINYL TIPPED: Brand: AIRLIFE™

## (undated) DEVICE — SINGLE PORT MANIFOLD: Brand: NEPTUNE 2

## (undated) DEVICE — CONNECTOR TBNG OD5-7MM O2 END DISP

## (undated) DEVICE — GAUZE,SPONGE,4"X4",12PLY,WOVEN,NS,LF: Brand: MEDLINE

## (undated) DEVICE — NEEDLE SYRINGE 18GA L1.5IN RED PLAS HUB S STL BLNT FILL W/O

## (undated) DEVICE — GUIDEWIRE WITH SPRING TIP - SINGLE USE: Brand: SAFEGUIDE®

## (undated) DEVICE — SYRINGE MED 3ML CLR PLAS STD N CTRL LUERLOCK TIP DISP

## (undated) DEVICE — NEEDLE SYR 18GA L1.5IN RED PLAS HUB S STL BLNT FILL W/O

## (undated) DEVICE — ENDOSCOPIC KIT 1.1+ OP4 CA DE 2 GWN AAMI LEVEL 3

## (undated) DEVICE — CANNULA NSL ORAL AD FOR CAPNOFLEX CO2 O2 AIRLFE

## (undated) DEVICE — SYRINGE MED 10ML LUERLOCK TIP W/O SFTY DISP

## (undated) DEVICE — YANKAUER,BULB TIP,W/O VENT,RIGID,STERILE: Brand: MEDLINE

## (undated) DEVICE — FORCEPS BX L240CM JAW DIA2.8MM L CAP W/ NDL MIC MESH TOOTH

## (undated) DEVICE — LUBE JELLY FOIL PACK 1.4 OZ: Brand: MEDLINE INDUSTRIES, INC.

## (undated) DEVICE — KENDALL RADIOLUCENT FOAM MONITORING ELECTRODE RECTANGULAR SHAPE: Brand: KENDALL

## (undated) DEVICE — BLOCK BITE AD 60FR W/ VELC STRP ADDRESSES MOST PT AND

## (undated) DEVICE — SYRINGE MEDICAL 3ML CLEAR PLASTIC STANDARD NON CONTROL LUERLOCK TIP DISPOSABLE

## (undated) DEVICE — SOLUTION IRRIG 1000ML H2O PIC PLAS SHATTERPROOF CONTAINER

## (undated) DEVICE — CONTAINER FORMALIN PREFILLED 10% NBF 60ML

## (undated) DEVICE — SYRINGE, LUER SLIP, STERILE, 60ML: Brand: MEDLINE

## (undated) DEVICE — MOUTHPIECE ENDOSCP L CTRL OPN AND SIDE PORTS DISP